# Patient Record
Sex: MALE | Race: WHITE | NOT HISPANIC OR LATINO | Employment: FULL TIME | URBAN - METROPOLITAN AREA
[De-identification: names, ages, dates, MRNs, and addresses within clinical notes are randomized per-mention and may not be internally consistent; named-entity substitution may affect disease eponyms.]

---

## 2020-07-06 ENCOUNTER — HOSPITAL ENCOUNTER (EMERGENCY)
Facility: MEDICAL CENTER | Age: 60
End: 2020-07-06
Attending: EMERGENCY MEDICINE
Payer: COMMERCIAL

## 2020-07-06 VITALS
DIASTOLIC BLOOD PRESSURE: 75 MMHG | SYSTOLIC BLOOD PRESSURE: 125 MMHG | RESPIRATION RATE: 16 BRPM | OXYGEN SATURATION: 96 % | TEMPERATURE: 99.2 F | BODY MASS INDEX: 31.21 KG/M2 | HEART RATE: 71 BPM | HEIGHT: 66 IN | WEIGHT: 194.22 LBS

## 2020-07-06 DIAGNOSIS — T15.01XA FOREIGN BODY OF RIGHT CORNEA, INITIAL ENCOUNTER: ICD-10-CM

## 2020-07-06 PROCEDURE — 65222 REMOVE FOREIGN BODY FROM EYE: CPT

## 2020-07-06 PROCEDURE — 99284 EMERGENCY DEPT VISIT MOD MDM: CPT

## 2020-07-06 PROCEDURE — 700101 HCHG RX REV CODE 250: Performed by: EMERGENCY MEDICINE

## 2020-07-06 RX ORDER — MOXIFLOXACIN 5 MG/ML
1 SOLUTION/ DROPS OPHTHALMIC 4 TIMES DAILY
Qty: 1 BOTTLE | Refills: 0 | Status: SHIPPED | OUTPATIENT
Start: 2020-07-06 | End: 2021-06-29

## 2020-07-06 RX ORDER — PROPARACAINE HYDROCHLORIDE 5 MG/ML
1 SOLUTION/ DROPS OPHTHALMIC ONCE
Status: COMPLETED | OUTPATIENT
Start: 2020-07-06 | End: 2020-07-06

## 2020-07-06 RX ADMIN — PROPARACAINE HYDROCHLORIDE 1 DROP: 5 SOLUTION/ DROPS OPHTHALMIC at 17:30

## 2020-07-06 RX ADMIN — FLUORESCEIN SODIUM 1 MG: 1 STRIP OPHTHALMIC at 17:30

## 2020-07-06 NOTE — LETTER
"  FORM C-4:  EMPLOYEE’S CLAIM FOR COMPENSATION/ REPORT OF INITIAL TREATMENT  EMPLOYEE’S CLAIM - PROVIDE ALL INFORMATION REQUESTED   First Name Hank Last Name George Birthdate 1960  Sex male Claim Number   Home Employee Address [address]                                     Zip  [address] Height  1.676 m (5' 6\") Weight  88.1 kg (194 lb 3.6 oz) [facility]     Mailing Employee Address [address]               Zip  [address] Telephone  [phone] (home)  Primary Language Spoken  English   Insurer   Third Party   EMPLOYERS INSURANCE Employee's Occupation (Job Title) When Injury or Occupational Disease Occurred     Employer's Name    [employer] Telephone [phone]    Employer Address [address]] Zip [address]   Date of Injury  7/6/2020       Hour of Injury  3:00 PM Date Employer Notified  7/6/2020 Last Day of Work after Injury or Occupational Disease  7/6/2020 Supervisor to Whom Injury Reported  Jens Vasquez   Address or Location of Accident (if applicable) [[address]]   What were you doing at the time of accident? (if applicable) Working under a car    How did this injury or occupational disease occur? Be specific and answer in detail. Use additional sheet if necessary)  Putting under carriage cover on under car with screws and screw    If you believe that you have an occupational disease, when did you first have knowledge of the disability and it relationship to your employment? N/A Witnesses to the Accident  Reynaldo   Nature of Injury or Occupational Disease  Workers' Compensation Part(s) of Body Injured or Affected  Eye (R), N/A, N/A    I CERTIFY THAT THE ABOVE IS TRUE AND CORRECT TO THE BEST OF MY KNOWLEDGE AND THAT I HAVE PROVIDED THIS INFORMATION IN ORDER TO OBTAIN THE BENEFITS OF [place], " INCLUSIVE OR CHAPTER 617 OF [place]).  I HEREBY AUTHORIZE ANY PHYSICIAN, CHIROPRACTOR, SURGEON, PRACTITIONER, OR OTHER PERSON, ANY HOSPITAL, INCLUDING [facility] OR [facility]ITAL, ANY MEDICAL SERVICE ORGANIZATION, ANY INSURANCE COMPANY, OR OTHER INSTITUTION OR ORGANIZATION TO RELEASE TO EACH OTHER, ANY MEDICAL OR OTHER INFORMATION, INCLUDING BENEFITS PAID OR PAYABLE, PERTINENT TO THIS INJURY OR DISEASE, EXCEPT INFORMATION RELATIVE TO DIAGNOSIS, TREATMENT AND/OR COUNSELING FOR AIDS, PSYCHOLOGICAL CONDITIONS, ALCOHOL OR CONTROLLED SUBSTANCES, FOR WHICH I MUST GIVE SPECIFIC AUTHORIZATION.  A PHOTOSTAT OF THIS AUTHORIZATION SHALL BE AS VALID AS THE ORIGINAL.  Date: 07/06/2020             Place: [facility]                    Employee’s Signature:   THIS REPORT MUST BE COMPLETED AND MAILED WITHIN 3 WORKING DAYS OF TREATMENT   Place [facility], EMERGENCY DEPT                                                                             Name of Facility [facility]   Date  7/6/2020 Diagnosis  (T15.01XA) Foreign body of right cornea, initial encounter Is there evidence the injured employee was under the influence of alcohol and/or another controlled substance at the time of accident?   Hour  6:02 PM Description of Injury or Disease  Foreign body of right cornea, initial encounter No   Treatment  Removal of Foreign body right eye  Have you advised the patient to remain off work five days or more?         No   X-Ray Findings    If Yes   From Date    To Date      From information given by the employee, together with medical evidence, can you directly connect this injury or occupational disease as job incurred? Yes If No, is employee capable of: Full Duty  No Modified Duty  No   Is additional medical care by a physician indicated? Yes If Modified Duty, Specify any Limitations / Restrictions   Off on 7/7 to see eye physician return to work 7/8   "  Do you know of any previous injury or disease contributing to this condition or occupational disease? No    Date 7/6/2020 Print Doctor’s Name Issa Mera certify the employer’s copy of this form was mailed on:   Address [address] [address] [phone] INSURER’S USE ONLY   Provider’s Tax ID Number [id number] Telephone Dept: [phone]    Doctor’s Signature e-ISSA Villavicencio D.O. Degree     MD      Form C-4 (rev.10/07)                                                                         BRIEF DESCRIPTION OF RIGHTS AND BENEFITS  (Pursuant to [place] 616C.050)    Notice of Injury or Occupational Disease (Incident Report Form C-1): If an injury or occupational disease (OD) arises out of and in the course of employment, you must provide written notice to your employer as soon as practicable, but no later than 7 days after the accident or OD. Your employer shall maintain a sufficient supply of the required forms.    Claim for Compensation (Form C-4): If medical treatment is sought, the form C-4 is available at the place of initial treatment. A completed \"Claim for Compensation\" (Form C-4) must be filed within 90 days after an accident or OD. The treating physician or chiropractor must, within 3 working days after treatment, complete and mail to the employer, the employer's insurer and third-party , the Claim for Compensation.    Medical Treatment: If you require medical treatment for your on-the-job injury or OD, you may be required to select a physician or chiropractor from a list provided by your workers’ compensation insurer, if it has contracted with an Organization for Managed Care (MCO) or Preferred Provider Organization (PPO) or providers of health care. If your employer has not entered into a contract with an MCO or PPO, you may select a physician or chiropractor from the Panel of Physicians and Chiropractors. Any medical costs related to your industrial injury " or OD will be paid by your insurer.    Temporary Total Disability (TTD): If your doctor has certified that you are unable to work for a period of at least 5 consecutive days, or 5 cumulative days in a 20-day period, or places restrictions on you that your employer does not accommodate, you may be entitled to TTD compensation.    Temporary Partial Disability (TPD): If the wage you receive upon reemployment is less than the compensation for TTD to which you are entitled, the insurer may be required to pay you TPD compensation to make up the difference. TPD can only be paid for a maximum of 24 months.    Permanent Partial Disability (PPD): When your medical condition is stable and there is an indication of a PPD as a result of your injury or OD, within 30 days, your insurer must arrange for an evaluation by a rating physician or chiropractor to determine the degree of your PPD. The amount of your PPD award depends on the date of injury, the results of the PPD evaluation and your age and wage.    Permanent Total Disability (PTD): If you are medically certified by a treating physician or chiropractor as permanently and totally disabled and have been granted a PTD status by your insurer, you are entitled to receive monthly benefits not to exceed 66 2/3% of your average monthly wage. The amount of your PTD payments is subject to reduction if you previously received a PPD award.    Vocational Rehabilitation Services: You may be eligible for vocational rehabilitation services if you are unable to return to the job due to a permanent physical impairment or permanent restrictions as a result of your injury or occupational disease.    Transportation and Per Fabi Reimbursement: You may be eligible for travel expenses and per fabi associated with medical treatment.    Reopening: You may be able to reopen your claim if your condition worsens after claim closure.     Appeal Process: If you disagree with a written determination  issued by the insurer or the insurer does not respond to your request, you may appeal to the Department of Administration, , by following the instructions contained in your determination letter. You must appeal the determination within 70 days from the date of the determination letter at [address], [address], [place], [place] [address], or [address], [address], [place], [place] [address]. If you disagree with the  decision, you may appeal to the Department of Administration, . You must file your appeal within 30 days from the date of the  decision letter at [address], [address], [place], [place] [address], or [address], [address], [place], [place] [address]. If you disagree with a decision of an , you may file a petition for judicial review with the District Court. You must do so within 30 days of the Appeal Officer’s decision. You may be represented by an  at your own expense or you may contact the [facility] for possible representation.    [place]  for Injured Workers (NAIW): If you disagree with a  decision, you may request that NAIW represent you without charge at an  Hearing. For information regarding denial of benefits, you may contact the [facility] at: [address], [address], [place] [address], [phone], or [address], [address], [place] [address], [phone]    To File a Complaint with the Division: If you wish to file a complaint with the  of the Division of Industrial Relations (DIR),  please contact the Workers’ Compensation Section, [address], [place], [place] [address], telephone [phone], or [address], [place], [place] [address], telephone [phone].    For assistance with Workers’ Compensation Issues: You may contact the Office of the Governor Consumer  Health Assistance, [address], [address], [place], [place] [address], Toll Free 1-745.178.5088, Web site: [email or web address]., E-mail [email or web address].  D-2 (rev. 06/18)              __________________________________________________________________                                    __07/06/2020_______________            Employee Name / Signature                                                                                                                            Date

## 2020-07-07 NOTE — ED PROVIDER NOTES
ED Provider Note    CHIEF COMPLAINT  Chief Complaint   Patient presents with   • Foreign Body in Eye      SIIS at [address] metal in rt eye Referred to ER by [facility]  Hank Vazquez is a 59 y.o. male who presents to the emergency room today with complaints of foreign body to right eye.  Patient states he was at work earlier today on her car and some dirt and metal fell onto his right I did flush it out there was still some left in there so presents to the emergency room.  Has history of diabetes denies any nausea no vomiting no fever chills no changes to bowel or bladder other complaints at this time tetanus is up-to-date.    REVIEW OF SYSTEMS  See HPI for further details. All other systems are negative.     PAST MEDICAL HISTORY  Past Medical History:   Diagnosis Date   • Diabetes (HCC)        FAMILY HISTORY  History reviewed. No pertinent family history.    SOCIAL HISTORY  Social History     Socioeconomic History   • Marital status:      Spouse name: Not on file   • Number of children: Not on file   • Years of education: Not on file   • Highest education level: Not on file   Occupational History   • Not on file   Social Needs   • Financial resource strain: Not on file   • Food insecurity     Worry: Not on file     Inability: Not on file   • Transportation needs     Medical: Not on file     Non-medical: Not on file   Tobacco Use   • Smoking status: Former Smoker   • Smokeless tobacco: Never Used   Substance and Sexual Activity   • Alcohol use: Not Currently   • Drug use: Not Currently   • Sexual activity: Not on file   Lifestyle   • Physical activity     Days per week: Not on file     Minutes per session: Not on file   • Stress: Not on file   Relationships   • Social connections     Talks on phone: Not on file     Gets together: Not on file     Attends Scientologist service: Not on file     Active member of club or organization: Not on file     Attends meetings of clubs or organizations: Not on file      "Relationship status: Not on file   • Intimate partner violence     Fear of current or ex partner: Not on file     Emotionally abused: Not on file     Physically abused: Not on file     Forced sexual activity: Not on file   Other Topics Concern   • Not on file   Social History Narrative   • Not on file       SURGICAL HISTORY  Past Surgical History:   Procedure Laterality Date   • OTHER      tubes in ears as child   • OTHER ORTHOPEDIC SURGERY      RT elbow ORIF       CURRENT MEDICATIONS  Home Medications    **Home medications have not yet been reviewed for this encounter**         ALLERGIES  No Known Allergies    PHYSICAL EXAM  VITAL SIGNS: /75   Pulse 71   Temp 37.3 °C (99.2 °F) (Temporal)   Resp 16   Ht 1.676 m (5' 6\")   Wt 88.1 kg (194 lb 3.6 oz)   SpO2 96%   BMI 31.35 kg/m²       Constitutional: Well developed, Well nourished, No acute distress, Non-toxic appearance.   HENT: Normocephalic, Atraumatic, Bilateral external ears normal, Oropharynx moist, No oral exudates, Nose normal.   Eyes: Over the limbic area of the cornea on 3:00 patient has foreign body noted inferiorly with slight rust ring this was removed by ER physician visual acuities were noted.  Slit lamp examination performed  Neck: Normal range of motion, No tenderness, Supple, No stridor.   Lymphatic: No lymphadenopathy noted.   Cardiovascular: Normal heart rate, Normal rhythm, No murmurs, No rubs, No gallops.   Thorax & Lungs: Normal breath sounds, No respiratory distress, No wheezing, No chest tenderness.   Skin: Warm, Dry, No erythema, No rash.   Extremities: Intact distal pulses, No edema, No tenderness, No cyanosis, No clubbing.     COURSE & MEDICAL DECISION MAKING  Pertinent Labs & Imaging studies reviewed. (See chart for details)  Sandhya case with Dr. Brown will follow patient up in the office tomorrow.  Patient placed on Vigamox.  Return if further problems discharged with above instructions for follow-up as described above. c-4 form " was filled out      FINAL IMPRESSION  1.  Acute foreign body cornea, right eye  2.  Corneal abrasion second #1 right eye  3.      Electronically signed by: Issa Mera D.O., 7/6/2020 11:16 PM

## 2020-07-07 NOTE — ED NOTES
Discharge and f/u (optho tomorrow @ 1:30) instructions discussed and understanding verbalized.  Ambulatory out of ED.  RX called in to [facility] in [place].

## 2020-07-11 NOTE — ED PROVIDER NOTES
PROCEDURE NOTE;    Removal of Foreign body from right eye: patient was given proparacaine drops.  Using sterile needle foreign body removed patient tolerated procedure well no complications.

## 2021-06-21 ENCOUNTER — TELEPHONE (OUTPATIENT)
Dept: SCHEDULING | Facility: IMAGING CENTER | Age: 61
End: 2021-06-21

## 2021-06-29 ENCOUNTER — HOSPITAL ENCOUNTER (OUTPATIENT)
Dept: LAB | Facility: MEDICAL CENTER | Age: 61
End: 2021-06-29
Attending: NURSE PRACTITIONER
Payer: COMMERCIAL

## 2021-06-29 ENCOUNTER — OFFICE VISIT (OUTPATIENT)
Dept: MEDICAL GROUP | Facility: PHYSICIAN GROUP | Age: 61
End: 2021-06-29
Payer: COMMERCIAL

## 2021-06-29 VITALS
SYSTOLIC BLOOD PRESSURE: 126 MMHG | DIASTOLIC BLOOD PRESSURE: 74 MMHG | WEIGHT: 192.6 LBS | HEIGHT: 66 IN | OXYGEN SATURATION: 96 % | BODY MASS INDEX: 30.95 KG/M2 | HEART RATE: 74 BPM | TEMPERATURE: 97.5 F

## 2021-06-29 DIAGNOSIS — R19.7 DIARRHEA, UNSPECIFIED TYPE: ICD-10-CM

## 2021-06-29 DIAGNOSIS — I10 ESSENTIAL HYPERTENSION: ICD-10-CM

## 2021-06-29 DIAGNOSIS — Z12.11 COLON CANCER SCREENING: ICD-10-CM

## 2021-06-29 DIAGNOSIS — E11.65 TYPE 2 DIABETES MELLITUS WITH HYPERGLYCEMIA, WITHOUT LONG-TERM CURRENT USE OF INSULIN (HCC): ICD-10-CM

## 2021-06-29 DIAGNOSIS — Z76.89 ENCOUNTER TO ESTABLISH CARE: ICD-10-CM

## 2021-06-29 PROBLEM — E11.9 TYPE 2 DIABETES MELLITUS (HCC): Status: ACTIVE | Noted: 2021-06-29

## 2021-06-29 LAB
ALBUMIN SERPL BCP-MCNC: 4.1 G/DL (ref 3.2–4.9)
ALBUMIN/GLOB SERPL: 1.3 G/DL
ALP SERPL-CCNC: 64 U/L (ref 30–99)
ALT SERPL-CCNC: 55 U/L (ref 2–50)
ANION GAP SERPL CALC-SCNC: 8 MMOL/L (ref 7–16)
AST SERPL-CCNC: 33 U/L (ref 12–45)
BILIRUB SERPL-MCNC: 0.9 MG/DL (ref 0.1–1.5)
BUN SERPL-MCNC: 15 MG/DL (ref 8–22)
CALCIUM SERPL-MCNC: 9.5 MG/DL (ref 8.5–10.5)
CHLORIDE SERPL-SCNC: 109 MMOL/L (ref 96–112)
CHOLEST SERPL-MCNC: 130 MG/DL (ref 100–199)
CO2 SERPL-SCNC: 23 MMOL/L (ref 20–33)
CREAT SERPL-MCNC: 0.87 MG/DL (ref 0.5–1.4)
FASTING STATUS PATIENT QL REPORTED: NORMAL
GLOBULIN SER CALC-MCNC: 3.2 G/DL (ref 1.9–3.5)
GLUCOSE SERPL-MCNC: 130 MG/DL (ref 65–99)
HDLC SERPL-MCNC: 32 MG/DL
LDLC SERPL CALC-MCNC: 70 MG/DL
POTASSIUM SERPL-SCNC: 4.3 MMOL/L (ref 3.6–5.5)
PROT SERPL-MCNC: 7.3 G/DL (ref 6–8.2)
SODIUM SERPL-SCNC: 140 MMOL/L (ref 135–145)
TRIGL SERPL-MCNC: 138 MG/DL (ref 0–149)

## 2021-06-29 PROCEDURE — 80053 COMPREHEN METABOLIC PANEL: CPT

## 2021-06-29 PROCEDURE — 85025 COMPLETE CBC W/AUTO DIFF WBC: CPT

## 2021-06-29 PROCEDURE — 99214 OFFICE O/P EST MOD 30 MIN: CPT | Performed by: NURSE PRACTITIONER

## 2021-06-29 PROCEDURE — 80061 LIPID PANEL: CPT

## 2021-06-29 PROCEDURE — 83036 HEMOGLOBIN GLYCOSYLATED A1C: CPT

## 2021-06-29 PROCEDURE — 36415 COLL VENOUS BLD VENIPUNCTURE: CPT

## 2021-06-29 RX ORDER — GLIMEPIRIDE 4 MG/1
TABLET ORAL
COMMUNITY
Start: 2021-06-04 | End: 2021-12-13 | Stop reason: SDUPTHER

## 2021-06-29 RX ORDER — ALPHA LIPOIC ACID 300 MG
300 CAPSULE ORAL
COMMUNITY
End: 2021-09-09

## 2021-06-29 RX ORDER — LANCETS 23 GAUGE
EACH MISCELLANEOUS
COMMUNITY
Start: 2018-12-20 | End: 2021-12-04

## 2021-06-29 RX ORDER — METFORMIN HYDROCHLORIDE 500 MG/1
1000 TABLET, EXTENDED RELEASE ORAL DAILY
Qty: 180 TABLET | Refills: 3 | Status: SHIPPED | OUTPATIENT
Start: 2021-06-29 | End: 2022-08-16 | Stop reason: SDUPTHER

## 2021-06-29 RX ORDER — LOPERAMIDE HYDROCHLORIDE 2 MG/1
2 CAPSULE ORAL 4 TIMES DAILY PRN
Qty: 30 CAPSULE | Refills: 0 | Status: SHIPPED | OUTPATIENT
Start: 2021-06-29 | End: 2021-09-09

## 2021-06-29 RX ORDER — VITAMIN B COMPLEX
5000 TABLET ORAL DAILY
COMMUNITY
End: 2021-09-09

## 2021-06-29 ASSESSMENT — PATIENT HEALTH QUESTIONNAIRE - PHQ9: CLINICAL INTERPRETATION OF PHQ2 SCORE: 0

## 2021-06-29 NOTE — PROGRESS NOTES
"Chief Complaint   Patient presents with   • Diarrhea   • Diabetes     1month and a half       Subjective:     HPI: [facility] is established with [facility] however he is a new patient to me.  His last primary care provider was Dr. España at [facility].  He carries with him the diagnosis of diabetes and HTN.    Hank Vazquez is a 60 y.o. male here to discuss the evaluation and management of:        Essential hypertension  Stable. Does not do Monitoring BP at home. Also taking baby aspirin. Denies lightheadedness, vision changes, headache, palpitations or leg swelling.  BP today is 126/74.      Type 2 diabetes mellitus (HCC)  This is a chronic condition diagnosis in 2008.  Current medications: Metformin 1000 mg BID Amaryl 4 mg daily .  Last A1c: unknown.Pt report that is was low and that his last PCP was happy with were is was at.   Last Microalb/Cr ratio: unknown  Fasting sugars at home: today 137 yesterday was 135 in the AM.   Last diabetic foot exam: unknown  Last retinal eye exam: about 5-6 months ago at [facility]  ACEi/ARB? none  Statin? About 3-4 months ago [facility]. Status is unknown  Aspirin? yes   Concomitant HTN? At goal 126/74. Not ACE/ARB    Nightly foot checks?  yes  Denies any hypoglycemic events, polyuria or polydipsia.       Diarrhea  Onset: about 1 month ago  consistancy: watery/pasty, constant, changes in color from green, dark brown/black, to tan. Yesterday it was green. Denies any blood or mucus in his stools  Frequency: 5-6 times a day and staying the same with mild increase in \"chunks\"  When do the symptoms occur? After eatting  Modifying factors: nothing tried  Associated symptoms: fever, abdominal pain, nausea/vomiting, or abdominal bloating.   Positive for mild crapping right before having diarrhea.   Currently on Metformin 1000 mg and has been on it since 2008.   No recent travel, camping or family members sick.         ROS:  Gen: no fevers/chills, no changes in weight  Eyes: no changes in " "vision  ENT: no sore throat,   Pulm: no sob, no cough  CV: no chest pain, no palpitations  GI: Positive per HPI   : no dysuria  MSk: no myalgias  Neuro: no numbness/tingling  Heme/Lymph: no easy bruising    No Known Allergies    Current medicines (including changes today)  Current Outpatient Medications   Medication Sig Dispense Refill   • Glucose Blood (COOL BLOOD GLUCOSE TEST STRIPS VI)      • Lancets 28G Misc      • glimepiride (AMARYL) 4 MG Tab TAKE 1 TABLET BY MOUTH DAILY     • Fenugreek 610 MG Cap Take 610 mg by mouth every day.     • Alpha-Lipoic Acid 300 MG Cap Take 300 mg by mouth.     • vitamin D (CHOLECALCIFEROL) 1000 Unit (25 mcg) Tab Take 5,000 Units by mouth every day.     • loperamide (IMODIUM) 2 MG Cap Take 1 capsule by mouth 4 times a day as needed for Diarrhea. 30 capsule 0   • metFORMIN ER (GLUCOPHAGE XR) 500 MG TABLET SR 24 HR Take 2 Tablets by mouth every day. 180 tablet 3     No current facility-administered medications for this visit.       Social History     Tobacco Use   • Smoking status: Former Smoker   • Smokeless tobacco: Never Used   Vaping Use   • Vaping Use: Never used   Substance Use Topics   • Alcohol use: Not Currently   • Drug use: Not Currently       Patient Active Problem List    Diagnosis Date Noted   • Type 2 diabetes mellitus (HCC) 06/29/2021   • Diarrhea 06/29/2021   • Essential hypertension 04/16/2015       Family History   Problem Relation Age of Onset   • Heart Attack Mother    • Alzheimer's Disease Mother    • Arthritis Father    • Gout Father    • Alzheimer's Disease Maternal Grandfather           Objective:     /74 (BP Location: Right arm, Patient Position: Sitting, BP Cuff Size: Large adult)   Pulse 74   Temp 36.4 °C (97.5 °F) (Temporal)   Ht 1.664 m (5' 5.5\")   Wt 87.4 kg (192 lb 9.6 oz)   SpO2 96%  Body mass index is 31.56 kg/m².    Physical Exam:  Constitutional: Well-developed and well-nourished male in NAD. Not diaphoretic. No distress.   Skin: warm, " dry, intact, no evidence of rash or concerning lesions  Head: Atraumatic without lesions.  Eyes: Conjunctivae and extraocular motions are normal. Pupils are equal, round, and reactive to light. No scleral icterus.   Ears:  External ears unremarkable. TMs normal; bilaterally  Mouth/Throat: Tongue normal. Oropharynx is clear and moist. Posterior pharynx without erythema or exudates.  Neck: Supple, trachea midline. No thyromegaly present. No cervical or supraclavicular lymphadenopathy.  Cardiovascular: Regular rate and rhythm without murmur.  Radial pulses are intact and equal bilaterally.  Pulmonary: Clear to ausculation. Normal effort. No rales, ronchi, or wheezing.  Abdomen: Soft, non tender, and without distention. Hyperactive bowel sounds in all four quadrants. No rebound, guarding, masses or hepatosplenomegaly.  Extremities: No cyanosis, clubbing, erythema, nor edema.   Neurological: Alert and oriented x 3.  No cranial nerve deficit. 5/5 myotomes. Sensation intact.   Psychiatric:  Behavior, mood, and affect are appropriate.       Assessment and Plan:     The following treatment plan was discussed:    1. Encounter to establish care  Very pleasant 60-year-old male patient presents here today to establish care. His previous primary care provider was [facility]. He denies any history of having a colonoscopy. He does carry with him the diagnosis of hypertension and diabetes. He is unsure of his last blood results. We will be requesting records from [facility]. He reports last blood work was done approximately 5 months ago.    2. Essential hypertension  This is well controlled condition. Patient is not currently on an ACE inhibitor. We will discuss at future appointments after labs have been completed.  - Comp Metabolic Panel; Future    3. Type 2 diabetes mellitus with hyperglycemia, without long-term current use of insulin (HCC)  Current status unknown. Discussed with patient the importance of following a diabetic diet  as well as lifestyle modifications. Change Metformin to extended release due to recent diarrhea. Labs been ordered and follow-up in 3 weeks.  - Comp Metabolic Panel; Future  - Lipid Profile; Future  - HEMOGLOBIN A1C; Future  - Complete O&P; Future  - metFORMIN ER (GLUCOPHAGE XR) 500 MG TABLET SR 24 HR; Take 2 Tablets by mouth every day.  Dispense: 180 tablet; Refill: 3    4. Diarrhea, unspecified type  This is a new condition. Discussed with patient the brat diet and gave him discharge education in regards to diabetes care. Metformin was changed to extended release. Referral to colonoscopy was placed. Imodium prescription sent to pharmacy and proper use was discussed with patient. Patient was sent with hat and specimen collection cup to collect stool at home. He declined rectal exam today.  - Comp Metabolic Panel; Future  - CBC WITH DIFFERENTIAL; Future  - CULTURE STOOL; Future  - OCCULT BLOOD FECES IMMUNOASSAY; Future  - Fecal Lactoferrin Qualitative  - Complete O&P; Future  - REFERRAL TO GI FOR COLONOSCOPY   loperamide (IMODIUM) 2 MG Cap; Take 1 capsule by mouth 4 times a day as needed for Diarrhea.  Dispense: 30 capsule; Refill: 0    5. Colon cancer screening  - REFERRAL TO GI FOR COLONOSCOPY    Other orders  - Glucose Blood (COOL BLOOD GLUCOSE TEST STRIPS VI)  - Lancets 28G Misc  - glimepiride (AMARYL) 4 MG Tab; TAKE 1 TABLET BY MOUTH DAILY  - Fenugreek 610 MG Cap; Take 610 mg by mouth every day.  - Alpha-Lipoic Acid 300 MG Cap; Take 300 mg by mouth.  - vitamin D (CHOLECALCIFEROL) 1000 Unit (25 mcg) Tab; Take 5,000 Units by mouth every day.    Any change or worsening of signs or symptoms, patient encouraged to follow-up or report to emergency room for further evaluation. Patient verbalizes understanding and agrees.    Follow-Up: Return in about 3 weeks (around 7/20/2021) for diarrhea and DM.      PLEASE NOTE: This dictation was created using voice recognition software. I have made every reasonable attempt to  correct obvious errors, but I expect that there are errors of grammar and possibly content that I did not discover before finalizing the note.

## 2021-06-29 NOTE — ASSESSMENT & PLAN NOTE
"Onset: about 1 month ago  consistancy: watery/pasty, constant, changes in color from green, dark brown/black, to tan. Yesterday it was green. Denies any blood or mucus in his stools  Frequency: 5-6 times a day and staying the same with mild increase in \"chunks\"  When do the symptoms occur? After eatting  Modifying factors: nothing tried  Associated symptoms: fever, abdominal pain, nausea/vomiting, or abdominal bloating.   Positive for mild crapping right before having diarrhea.   Currently on Metformin 1000 mg and has been on it since 2008.   No recent travel, camping or family members sick.     "

## 2021-06-29 NOTE — ASSESSMENT & PLAN NOTE
Stable. Does not do Monitoring BP at home. Also taking baby aspirin. Denies lightheadedness, vision changes, headache, palpitations or leg swelling.  BP today is 126/74.

## 2021-06-29 NOTE — LETTER
[facility]  LEEANN Bucio  [address] [place]  [place] [address]  Fax: [phone]   Authorization for Release/Disclosure of   Protected Health Information   Name: ANGEL VAZQUEZ : 1960 SSN: [id number]   Address: [address] [place] [address]  [place] [address] Phone:    [phone] (home)    I authorize the entity listed below to release/disclose the PHI below to:   [facility]/LEEANN Bucio and LEEANN Bucio   Provider or Entity Name:     Address   City, State, Zip   Phone:      Fax:     Reason for request: continuity of care   Information to be released:    [  ] LAST COLONOSCOPY,  including any PATH REPORT and follow-up  [  ] LAST FIT/COLOGUARD RESULT [  ] LAST DEXA  [  ] LAST MAMMOGRAM  [  ] LAST PAP  [  ] LAST LABS [  ] RETINA EXAM REPORT  [  ] IMMUNIZATION RECORDS  [  ] Release all info      [  ] Check here and initial the line next to each item to release ALL health information INCLUDING  _____ Care and treatment for drug and / or alcohol abuse  _____ HIV testing, infection status, or AIDS  _____ Genetic Testing    DATES OF SERVICE OR TIME PERIOD TO BE DISCLOSED: _____________  I understand and acknowledge that:  * This Authorization may be revoked at any time by you in writing, except if your health information has already been used or disclosed.  * Your health information that will be used or disclosed as a result of you signing this authorization could be re-disclosed by the recipient. If this occurs, your re-disclosed health information may no longer be protected by State or Federal laws.  * You may refuse to sign this Authorization. Your refusal will not affect your ability to obtain treatment.  * This Authorization becomes effective upon signing and will  on (date) __________.      If no date is indicated, this Authorization will  one (1) year from the signature date.    Name: Angel Vazquez    Signature:   Date:     2021        PLEASE FAX REQUESTED RECORDS BACK TO: [phone]

## 2021-06-29 NOTE — ASSESSMENT & PLAN NOTE
This is a chronic condition diagnosis in 2008.  Current medications: Metformin 1000 mg BID Amaryl 4 mg daily .  Last A1c: unknown.Pt report that is was low and that his last PCP was happy with were is was at.   Last Microalb/Cr ratio: unknown  Fasting sugars at home: today 137 yesterday was 135 in the AM.   Last diabetic foot exam: unknown  Last retinal eye exam: about 5-6 months ago at [facility]  ACEi/ARB? none  Statin? About 3-4 months ago at [facility]. Status is unknown  Aspirin? yes   Concomitant HTN? At goal 126/74. Not ACE/ARB    Nightly foot checks?  yes  Denies any hypoglycemic events, polyuria or polydipsia.

## 2021-06-29 NOTE — PATIENT INSTRUCTIONS
Diabetes Basics    Diabetes (diabetes mellitus) is a long-term (chronic) disease. It occurs when the body does not properly use sugar (glucose) that is released from food after you eat.  Diabetes may be caused by one or both of these problems:  · Your pancreas does not make enough of a hormone called insulin.  · Your body does not react in a normal way to insulin that it makes.  Insulin lets sugars (glucose) go into cells in your body. This gives you energy. If you have diabetes, sugars cannot get into cells. This causes high blood sugar (hyperglycemia).  Follow these instructions at home:  How is diabetes treated?  You may need to take insulin or other diabetes medicines daily to keep your blood sugar in balance. Take your diabetes medicines every day as told by your doctor. List your diabetes medicines here:  Diabetes medicines  · Name of medicine: ______________________________  ? Amount (dose): _______________ Time (a.m./p.m.): _______________ Notes: ___________________________________  · Name of medicine: ______________________________  ? Amount (dose): _______________ Time (a.m./p.m.): _______________ Notes: ___________________________________  · Name of medicine: ______________________________  ? Amount (dose): _______________ Time (a.m./p.m.): _______________ Notes: ___________________________________  If you use insulin, you will learn how to give yourself insulin by injection. You may need to adjust the amount based on the food that you eat. List the types of insulin you use here:  Insulin  · Insulin type: ______________________________  ? Amount (dose): _______________ Time (a.m./p.m.): _______________ Notes: ___________________________________  · Insulin type: ______________________________  ? Amount (dose): _______________ Time (a.m./p.m.): _______________ Notes: ___________________________________  · Insulin type: ______________________________  ? Amount (dose): _______________ Time (a.m./p.m.):  _______________ Notes: ___________________________________  · Insulin type: ______________________________  ? Amount (dose): _______________ Time (a.m./p.m.): _______________ Notes: ___________________________________  · Insulin type: ______________________________  ? Amount (dose): _______________ Time (a.m./p.m.): _______________ Notes: ___________________________________  How do I manage my blood sugar?    Check your blood sugar levels using a blood glucose monitor as directed by your doctor.  Your doctor will set treatment goals for you. Generally, you should have these blood sugar levels:  · Before meals (preprandial):  mg/dL (4.4-7.2 mmol/L).  · After meals (postprandial): below 180 mg/dL (10 mmol/L).  · A1c level: less than 7%.  Write down the times that you will check your blood sugar levels:  Blood sugar checks  · Time: _______________ Notes: ___________________________________  · Time: _______________ Notes: ___________________________________  · Time: _______________ Notes: ___________________________________  · Time: _______________ Notes: ___________________________________  · Time: _______________ Notes: ___________________________________  · Time: _______________ Notes: ___________________________________    What do I need to know about low blood sugar?  Low blood sugar is called hypoglycemia. This is when blood sugar is at or below 70 mg/dL (3.9 mmol/L). Symptoms may include:  · Feeling:  ? Hungry.  ? Worried or nervous (anxious).  ? Sweaty and clammy.  ? Confused.  ? Dizzy.  ? Sleepy.  ? Sick to your stomach (nauseous).  · Having:  ? A fast heartbeat.  ? A headache.  ? A change in your vision.  ? Tingling or no feeling (numbness) around the mouth, lips, or tongue.  ? Jerky movements that you cannot control (seizure).  · Having trouble with:  ? Moving (coordination).  ? Sleeping.  ? Passing out (fainting).  ? Getting upset easily (irritability).  Treating low blood sugar  To treat low blood  sugar, eat or drink something sugary right away. If you can think clearly and swallow safely, follow the 15:15 rule:  · Take 15 grams of a fast-acting carb (carbohydrate). Talk with your doctor about how much you should take.  · Some fast-acting carbs are:  ? Sugar tablets (glucose pills). Take 3-4 glucose pills.  ? 6-8 pieces of hard candy.  ? 4-6 oz (120-150 mL) of fruit juice.  ? 4-6 oz (120-150 mL) of regular (not diet) soda.  ? 1 Tbsp (15 mL) honey or sugar.  · Check your blood sugar 15 minutes after you take the carb.  · If your blood sugar is still at or below 70 mg/dL (3.9 mmol/L), take 15 grams of a carb again.  · If your blood sugar does not go above 70 mg/dL (3.9 mmol/L) after 3 tries, get help right away.  · After your blood sugar goes back to normal, eat a meal or a snack within 1 hour.  Treating very low blood sugar  If your blood sugar is at or below 54 mg/dL (3 mmol/L), you have very low blood sugar (severe hypoglycemia). This is an emergency. Do not wait to see if the symptoms will go away. Get medical help right away. Call your local emergency services (911 in the [place] Do not drive yourself to the hospital.  Questions to ask your health care provider  · Do I need to meet with a diabetes educator?  · What equipment will I need to care for myself at home?  · What diabetes medicines do I need? When should I take them?  · How often do I need to check my blood sugar?  · What number can I call if I have questions?  · When is my next doctor's visit?  · Where can I find a support group for people with diabetes?  Where to find more information  · American Diabetes Association: www.diabetes.org  · American Association of Diabetes Educators: www.diabeteseducator.org/patient-resources  Contact a doctor if:  · Your blood sugar is at or above 240 mg/dL (13.3 mmol/L) for 2 days in a row.  · You have been sick or have had a fever for 2 days or more, and you are not getting better.  · You have any of these  problems for more than 6 hours:  ? You cannot eat or drink.  ? You feel sick to your stomach (nauseous).  ? You throw up (vomit).  ? You have watery poop (diarrhea).  Get help right away if:  · Your blood sugar is lower than 54 mg/dL (3 mmol/L).  · You get confused.  · You have trouble:  ? Thinking clearly.  ? Breathing.  Summary  · Diabetes (diabetes mellitus) is a long-term (chronic) disease. It occurs when the body does not properly use sugar (glucose) that is released from food after digestion.  · Take insulin and diabetes medicines as told.  · Check your blood sugar every day, as often as told.  · Keep all follow-up visits as told by your doctor. This is important.  This information is not intended to replace advice given to you by your health care provider. Make sure you discuss any questions you have with your health care provider.  Document Released: 03/22/2019 Document Revised: 02/07/2020 Document Reviewed: 03/22/2019  [employer] Patient Education © 2020 [employer]      Diarrhea, Adult  Diarrhea is when you pass loose and watery poop (stool) often. Diarrhea can make you feel weak and cause you to lose water in your body (get dehydrated). Losing water in your body can cause you to:  · Feel tired and thirsty.  · Have a dry mouth.  · Go pee (urinate) less often.  Diarrhea often lasts 2-3 days. However, it can last longer if it is a sign of something more serious. It is important to treat your diarrhea as told by your doctor.  Follow these instructions at home:  Eating and drinking         Follow these instructions as told by your doctor:  · Take an ORS (oral rehydration solution). This is a drink that helps you replace fluids and minerals your body lost. It is sold at pharmacies and stores.  · Drink plenty of fluids, such as:  ? Water.  ? Ice chips.  ? Diluted fruit juice.  ? Low-calorie sports drinks.  ? Milk, if you want.  · Avoid drinking fluids that have a lot of sugar or caffeine in them.  · Eat bland,  easy-to-digest foods in small amounts as you are able. These foods include:  ? Bananas.  ? Applesauce.  ? Rice.  ? Low-fat (lean) meats.  ? Toast.  ? Crackers.  · Avoid alcohol.  · Avoid spicy or fatty foods.    Medicines  · Take over-the-counter and prescription medicines only as told by your doctor.  · If you were prescribed an antibiotic medicine, take it as told by your doctor. Do not stop using the antibiotic even if you start to feel better.  General instructions    · Wash your hands often using soap and water. If soap and water are not available, use a hand . Others in your home should wash their hands as well. Hands should be washed:  ? After using the toilet or changing a diaper.  ? Before preparing, cooking, or serving food.  ? While caring for a sick person.  ? While visiting someone in a hospital.  · Drink enough fluid to keep your pee (urine) pale yellow.  · Rest at home while you get better.  · Watch your condition for any changes.  · Take a warm bath to help with any burning or pain from having diarrhea.  · Keep all follow-up visits as told by your doctor. This is important.  Contact a doctor if:  · You have a fever.  · Your diarrhea gets worse.  · You have new symptoms.  · You cannot keep fluids down.  · You feel light-headed or dizzy.  · You have a headache.  · You have muscle cramps.  Get help right away if:  · You have chest pain.  · You feel very weak or you pass out (faint).  · You have bloody or black poop or poop that looks like tar.  · You have very bad pain, cramping, or bloating in your belly (abdomen).  · You have trouble breathing or you are breathing very quickly.  · Your heart is beating very quickly.  · Your skin feels cold and clammy.  · You feel confused.  · You have signs of losing too much water in your body, such as:  ? Dark pee, very little pee, or no pee.  ? Cracked lips.  ? Dry mouth.  ? Sunken eyes.  ? Sleepiness.  ? Weakness.  Summary  · Diarrhea is when you pass  loose and watery poop (stool) often.  · Diarrhea can make you feel weak and cause you to lose water in your body (get dehydrated).  · Take an ORS (oral rehydration solution). This is a drink that is sold at pharmacies and stores.  · Eat bland, easy-to-digest foods in small amounts as you are able.  · Contact a doctor if your condition gets worse. Get help right away if you have signs that you have lost too much water in your body.  This information is not intended to replace advice given to you by your health care provider. Make sure you discuss any questions you have with your health care provider.  Document Released: 06/05/2009 Document Revised: 05/24/2019 Document Reviewed: 05/24/2019  [employer] Patient Education © 2020 [employer]

## 2021-06-30 ENCOUNTER — HOSPITAL ENCOUNTER (OUTPATIENT)
Facility: MEDICAL CENTER | Age: 61
End: 2021-06-30
Attending: NURSE PRACTITIONER
Payer: COMMERCIAL

## 2021-06-30 DIAGNOSIS — E11.65 TYPE 2 DIABETES MELLITUS WITH HYPERGLYCEMIA, WITHOUT LONG-TERM CURRENT USE OF INSULIN (HCC): ICD-10-CM

## 2021-06-30 DIAGNOSIS — R19.7 DIARRHEA, UNSPECIFIED TYPE: ICD-10-CM

## 2021-06-30 LAB
BASOPHILS # BLD AUTO: 0.9 % (ref 0–1.8)
BASOPHILS # BLD: 0.06 K/UL (ref 0–0.12)
EOSINOPHIL # BLD AUTO: 0.34 K/UL (ref 0–0.51)
EOSINOPHIL NFR BLD: 5 % (ref 0–6.9)
ERYTHROCYTE [DISTWIDTH] IN BLOOD BY AUTOMATED COUNT: 43.1 FL (ref 35.9–50)
EST. AVERAGE GLUCOSE BLD GHB EST-MCNC: 148 MG/DL
HBA1C MFR BLD: 6.8 % (ref 4–5.6)
HCT VFR BLD AUTO: 46.5 % (ref 42–52)
HGB BLD-MCNC: 16.1 G/DL (ref 14–18)
IMM GRANULOCYTES # BLD AUTO: 0.02 K/UL (ref 0–0.11)
IMM GRANULOCYTES NFR BLD AUTO: 0.3 % (ref 0–0.9)
LYMPHOCYTES # BLD AUTO: 1.4 K/UL (ref 1–4.8)
LYMPHOCYTES NFR BLD: 20.4 % (ref 22–41)
MCH RBC QN AUTO: 30.7 PG (ref 27–33)
MCHC RBC AUTO-ENTMCNC: 34.6 G/DL (ref 33.7–35.3)
MCV RBC AUTO: 88.7 FL (ref 81.4–97.8)
MONOCYTES # BLD AUTO: 0.61 K/UL (ref 0–0.85)
MONOCYTES NFR BLD AUTO: 8.9 % (ref 0–13.4)
NEUTROPHILS # BLD AUTO: 4.42 K/UL (ref 1.82–7.42)
NEUTROPHILS NFR BLD: 64.5 % (ref 44–72)
NRBC # BLD AUTO: 0 K/UL
NRBC BLD-RTO: 0 /100 WBC
PLATELET # BLD AUTO: 200 K/UL (ref 164–446)
PMV BLD AUTO: 10.6 FL (ref 9–12.9)
RBC # BLD AUTO: 5.24 M/UL (ref 4.7–6.1)
WBC # BLD AUTO: 6.9 K/UL (ref 4.8–10.8)

## 2021-06-30 PROCEDURE — 87899 AGENT NOS ASSAY W/OPTIC: CPT

## 2021-06-30 PROCEDURE — 87328 CRYPTOSPORIDIUM AG IA: CPT

## 2021-06-30 PROCEDURE — 87329 GIARDIA AG IA: CPT

## 2021-06-30 PROCEDURE — 87045 FECES CULTURE AEROBIC BACT: CPT

## 2021-06-30 PROCEDURE — 83630 LACTOFERRIN FECAL (QUAL): CPT

## 2021-07-01 LAB
G LAMBLIA+C PARVUM AG STL QL RAPID: NORMAL
SIGNIFICANT IND 70042: NORMAL
SITE SITE: NORMAL
SOURCE SOURCE: NORMAL

## 2021-07-01 RX ORDER — BLOOD SUGAR DIAGNOSTIC
1 STRIP MISCELLANEOUS 2 TIMES DAILY PRN
Qty: 100 STRIP | Refills: 11 | Status: SHIPPED | OUTPATIENT
Start: 2021-07-01 | End: 2022-07-14 | Stop reason: SDUPTHER

## 2021-07-02 LAB
E COLI SXT1+2 STL IA: NORMAL
LACTOFERRIN STL QL IA: POSITIVE
SIGNIFICANT IND 70042: NORMAL
SITE SITE: NORMAL
SOURCE SOURCE: NORMAL

## 2021-07-04 LAB
BACTERIA STL CULT: NORMAL
C JEJUNI+C COLI AG STL QL: NORMAL
E COLI SXT1+2 STL IA: NORMAL
SIGNIFICANT IND 70042: NORMAL
SITE SITE: NORMAL
SOURCE SOURCE: NORMAL

## 2021-07-06 DIAGNOSIS — R19.7 DIARRHEA, UNSPECIFIED TYPE: ICD-10-CM

## 2021-09-09 ENCOUNTER — OFFICE VISIT (OUTPATIENT)
Dept: MEDICAL GROUP | Facility: PHYSICIAN GROUP | Age: 61
End: 2021-09-09
Payer: COMMERCIAL

## 2021-09-09 VITALS
WEIGHT: 199.8 LBS | RESPIRATION RATE: 16 BRPM | HEART RATE: 56 BPM | BODY MASS INDEX: 32.11 KG/M2 | DIASTOLIC BLOOD PRESSURE: 70 MMHG | HEIGHT: 66 IN | SYSTOLIC BLOOD PRESSURE: 120 MMHG | OXYGEN SATURATION: 92 % | TEMPERATURE: 97.6 F

## 2021-09-09 DIAGNOSIS — I10 ESSENTIAL HYPERTENSION: ICD-10-CM

## 2021-09-09 DIAGNOSIS — E11.65 TYPE 2 DIABETES MELLITUS WITH HYPERGLYCEMIA, WITHOUT LONG-TERM CURRENT USE OF INSULIN (HCC): ICD-10-CM

## 2021-09-09 DIAGNOSIS — K52.832 LYMPHOCYTIC COLITIS: ICD-10-CM

## 2021-09-09 PROCEDURE — 92250 FUNDUS PHOTOGRAPHY W/I&R: CPT | Mod: 26 | Performed by: NURSE PRACTITIONER

## 2021-09-09 PROCEDURE — 99214 OFFICE O/P EST MOD 30 MIN: CPT | Performed by: NURSE PRACTITIONER

## 2021-09-09 RX ORDER — ATORVASTATIN CALCIUM 20 MG/1
20 TABLET, FILM COATED ORAL EVERY EVENING
Qty: 90 TABLET | Refills: 3 | Status: SHIPPED | OUTPATIENT
Start: 2021-09-09 | End: 2022-12-19 | Stop reason: SDUPTHER

## 2021-09-09 RX ORDER — BUDESONIDE 3 MG/1
CAPSULE, COATED PELLETS ORAL
COMMUNITY
Start: 2021-08-27 | End: 2022-12-19

## 2021-09-09 ASSESSMENT — FIBROSIS 4 INDEX: FIB4 SCORE: 1.31

## 2021-09-09 NOTE — PATIENT INSTRUCTIONS
Probiotics  Probiotics are the good bacteria and yeasts that live in your body and keep your digestive system healthy. Probiotics also help your body's defense system (immune system) and protect your body against the growth of harmful bacteria. Your health care provider may recommend taking a probiotic if you are taking antibiotics or have certain medical conditions, such as:  · Diarrhea.  · Constipation.  · Irritable bowel syndrome.  · Lung infections.  · Yeast infections.  · Acne, eczema, and other skin conditions.  · Frequent urinary tract infections.  What affects the balance of bacteria in my body?  The balance of good bacteria in your body can be affected by:  · Antibiotic medicines. These medicines treat infections caused by bacteria. Unfortunately, they may kill the good bacteria in your body as well as the bad bacteria.  · Certain medical conditions. Conditions related to an imbalance of bacteria include:  ? Stomach and intestine (gastrointestinal) infections.  ? Lung infections.  ? Skin infections.  ? Vaginal infections.  ? Inflammatory bowel diseases.  ? Stomach ulcers (gastric ulcers).  ? Tooth decay and gum disease (periodontal disease).  · Stress.  · Poor diet.  What type of probiotic is right for me?  Probiotics contain different types of bacteria (strains). Strains commonly found in probiotics include:  · Lactobacillus.  · Saccharomyces.  · Bifidobacterium.  Specific strains have been shown to be more effective for certain health conditions. Ask your health care provider which strain or strains you should use and how often.  Probiotics come in many different forms, strain combinations, and strengths. Some may need to be refrigerated. Always read the label for storage and usage instructions.  Certain foods, such as yogurt, contain probiotics. Probiotics can also be bought as a supplement at a pharmacy, health food store, or grocery store. Talk to your health care provider before starting any  supplement.  What are the side effects of probiotics?  Some people have side effects when taking probiotics. Side effects are usually temporary and may include:  · Gas.  · Bloating.  · Cramping.  Serious side effects are rare.  Follow these instructions at home:    · If you are taking probiotics with antibiotics:  ? Wait at least 2 hours between taking your medicine and the probiotic.  ? Eat foods high in fiber, such as whole grains, beans, and vegetables. These foods can help good bacteria grow.  ? Avoid certain foods as told by your health care provider.  Summary  · Probiotics are the good bacteria and yeasts that live in your body and keep you and your digestive system healthy.  · Certain foods, such as yogurt, contain probiotics.  · Probiotics can be taken as supplements. They can be bought at a pharmacy, health food store, or grocery store. They come in many different forms, strain combinations, and strengths.  · Be sure to talk with your health care provider before taking a probiotic supplement.  This information is not intended to replace advice given to you by your health care provider. Make sure you discuss any questions you have with your health care provider.  Document Released: 07/15/2015 Document Revised: 09/06/2019 Document Reviewed: 01/02/2019  [employer] Patient Education © 2020 [employer]      Diabetes Basics    Diabetes (diabetes mellitus) is a long-term (chronic) disease. It occurs when the body does not properly use sugar (glucose) that is released from food after you eat.  Diabetes may be caused by one or both of these problems:  · Your pancreas does not make enough of a hormone called insulin.  · Your body does not react in a normal way to insulin that it makes.  Insulin lets sugars (glucose) go into cells in your body. This gives you energy. If you have diabetes, sugars cannot get into cells. This causes high blood sugar (hyperglycemia).  Follow these instructions at home:  How is diabetes  treated?  You may need to take insulin or other diabetes medicines daily to keep your blood sugar in balance. Take your diabetes medicines every day as told by your doctor. List your diabetes medicines here:  If you use insulin, you will learn how to give yourself insulin by injection. You may need to adjust the amount based on the food that you eat. List the types of insulin you use here:  How do I manage my blood sugar?    Check your blood sugar levels using a blood glucose monitor as directed by your doctor.  Your doctor will set treatment goals for you. Generally, you should have these blood sugar levels:  · Before meals (preprandial):  mg/dL (4.4-7.2 mmol/L).  · After meals (postprandial): below 180 mg/dL (10 mmol/L).  · A1c level: less than 7%.  Write down the times that you will check your blood sugar levels:  What do I need to know about low blood sugar?  Low blood sugar is called hypoglycemia. This is when blood sugar is at or below 70 mg/dL (3.9 mmol/L). Symptoms may include:  · Feeling:  ? Hungry.  ? Worried or nervous (anxious).  ? Sweaty and clammy.  ? Confused.  ? Dizzy.  ? Sleepy.  ? Sick to your stomach (nauseous).  · Having:  ? A fast heartbeat.  ? A headache.  ? A change in your vision.  ? Tingling or no feeling (numbness) around the mouth, lips, or tongue.  ? Jerky movements that you cannot control (seizure).  · Having trouble with:  ? Moving (coordination).  ? Sleeping.  ? Passing out (fainting).  ? Getting upset easily (irritability).  Treating low blood sugar  To treat low blood sugar, eat or drink something sugary right away. If you can think clearly and swallow safely, follow the 15:15 rule:  · Take 15 grams of a fast-acting carb (carbohydrate). Talk with your doctor about how much you should take.  · Some fast-acting carbs are:  ? Sugar tablets (glucose pills). Take 3-4 glucose pills.  ? 6-8 pieces of hard candy.  ? 4-6 oz (120-150 mL) of fruit juice.  ? 4-6 oz (120-150 mL) of regular  (not diet) soda.  ? 1 Tbsp (15 mL) honey or sugar.  · Check your blood sugar 15 minutes after you take the carb.  · If your blood sugar is still at or below 70 mg/dL (3.9 mmol/L), take 15 grams of a carb again.  · If your blood sugar does not go above 70 mg/dL (3.9 mmol/L) after 3 tries, get help right away.  · After your blood sugar goes back to normal, eat a meal or a snack within 1 hour.  Treating very low blood sugar  If your blood sugar is at or below 54 mg/dL (3 mmol/L), you have very low blood sugar (severe hypoglycemia). This is an emergency. Do not wait to see if the symptoms will go away. Get medical help right away. Call your local emergency services (911 in the [place] Do not drive yourself to the hospital.  Questions to ask your health care provider  · Do I need to meet with a diabetes educator?  · What equipment will I need to care for myself at home?  · What diabetes medicines do I need? When should I take them?  · How often do I need to check my blood sugar?  · What number can I call if I have questions?  · When is my next doctor's visit?  · Where can I find a support group for people with diabetes?  Where to find more information  · American Diabetes Association: www.diabetes.org  · American Association of Diabetes Educators: www.diabeteseducator.org/patient-resources  Contact a doctor if:  · Your blood sugar is at or above 240 mg/dL (13.3 mmol/L) for 2 days in a row.  · You have been sick or have had a fever for 2 days or more, and you are not getting better.  · You have any of these problems for more than 6 hours:  ? You cannot eat or drink.  ? You feel sick to your stomach (nauseous).  ? You throw up (vomit).  ? You have watery poop (diarrhea).  Get help right away if:  · Your blood sugar is lower than 54 mg/dL (3 mmol/L).  · You get confused.  · You have trouble:  ? Thinking clearly.  ? Breathing.  Summary  · Diabetes (diabetes mellitus) is a long-term (chronic) disease. It occurs when the body  does not properly use sugar (glucose) that is released from food after digestion.  · Take insulin and diabetes medicines as told.  · Check your blood sugar every day, as often as told.  · Keep all follow-up visits as told by your doctor. This is important.  This information is not intended to replace advice given to you by your health care provider. Make sure you discuss any questions you have with your health care provider.  Document Released: 03/22/2019 Document Revised: 02/07/2020 Document Reviewed: 03/22/2019  [employer] Patient Education © 2020 [employer]      Crohn's Disease    Crohn's disease is a long-lasting (chronic) disease that affects the gastrointestinal (GI) tract. Crohn's disease often causes irritation and inflammation in the small intestine and the beginning of the large intestine, but it can affect any part of the GI tract. Crohn's disease is part of a group of illnesses that are known as inflammatory bowel disease (IBD).  Crohn's disease may start slowly and get worse over time. Symptoms may come and go. They may also go away for months or even years at a time (remission).  What are the causes?  The exact cause of this condition is not known. It may involve a response that causes your body's disease-fighting (immune) system to attack healthy cells and tissues (autoimmune response). Bacteria, genes, and your environment may also play a role.  What increases the risk?  The following factors may make you more likely to develop this condition:  · Having a family member who has Crohn's disease, another IBD, or an autoimmune condition.  · Using products that contain nicotine or tobacco, such as cigarettes and e-cigarettes.  · Being in your 20s.  · Having Eastern  ancestry.  What are the signs or symptoms?  The main symptoms of this condition involve your GI tract. These include:  · Diarrhea.  · Pain or cramping in the abdomen. This is commonly felt in the lower right side of the  abdomen.  · Frequent watery or bloody stools.  · Constipation. This may mean having:  ? Fewer bowel movements in a week than normal.  ? Difficulty having a bowel movement.  ? Stools that are dry, hard, or larger than normal.  · Rectal bleeding.  · Rectal pain.  · An urgent need to have a bowel movement.  · The feeling that you are not finished having a bowel movement.  Other symptoms may include:  · Unexplained weight loss.  · Fatigue.  · Fever.  · Nausea.  · Loss of appetite.  · Joint pain.  · Vision changes.  · Red bumps or sores on the skin.  · Sores inside the mouth.  How is this diagnosed?  This condition may be diagnosed based on:  · Your symptoms and your medical history.  · A physical exam.  · Tests, which may include:  ? Blood tests.  ? Stool sample tests.  ? Imaging tests, such as X-rays and CT scans.  ? Tests to examine the inside of your intestines using a long, flexible tube that has a light and a camera on the end (endoscopy or colonoscopy).  ? A procedure to remove tissue samples from inside your bowel for testing (biopsy).  You may need to work with a health care provider who specializes in diseases of the digestive tract (gastroenterologist).  How is this treated?  There is no cure for this condition, but treatment can help you manage your symptoms. Crohn's disease affects each person differently. Your treatment may include:  · Resting your bowels. This involves having a period of healing time when your bowels are not passing stools. This may be done by:  ? Drinking only clear liquids. These are liquids that you can see through, such as water, black coffee, fruit juice without pulp, broth, gelatin, and ice pops.  ? Getting nutrition through an IV for a period of time.  · Medicines. These may be used by themselves or with other treatments (combination therapy). These may include antibiotic medicines. You may be given medicines that help to:  ? Reduce inflammation.  ? Control your immune system  activity.  ? Fight infections.  ? Relieve cramps and prevent diarrhea.  ? Control your pain.  · Surgery. You may need surgery if:  ? Medicines and other treatments are not working anymore.  ? You develop complications from severe Crohn's disease.  ? A section of your intestine becomes so damaged that it needs to be removed.  Follow these instructions at home:  Medicines  · Take over-the-counter and prescription medicines only as told by your health care provider.  · If you were prescribed an antibiotic, take it as told by your health care provider. Do not stop taking the antibiotic even if you start to feel better.  · Avoid taking ibuprofen or other NSAID medicines if possible, these can make Crohn's disease worse.  Eating and drinking  · Talk with your health care provider or a diet and nutrition specialist (registered dietitian) about what diet is best for you.  · Drink enough fluid to keep your urine pale yellow.  · If you are taking steroids to reduce inflammation, get plenty of calcium in your diet to help keep your bones healthy. You may also consider taking a calcium supplement with vitamin D.  · Keep a food diary to identify foods that make your symptoms better or worse.  · Avoid foods that cause symptoms.  · Follow instructions from your health care provider about eating or drinking restrictions if you have worsening symptoms (flare-up).  · Limit alcohol intake to no more than 1 drink a day for nonpregnant women and 2 drinks a day for men. One drink equals 12 oz of beer, 5 oz of wine, or 1½ oz of hard liquor.  General instructions  · Make sure you get all the vaccines that your health care provider recommends, especially pneumonia (pneumococcal) and flu (influenza) vaccines.  · Do not use any products that contain nicotine or tobacco, such as cigarettes and e-cigarettes. If you need help quitting, ask your health care provider.  · Exercise every day, or as often told by your health care provider.  · Keep  all follow-up visits as told by your health care provider. This is important.  Contact a health care provider if:  · You have diarrhea, cramps in your abdomen, and other GI problems that are present almost all the time.  · Your symptoms do not improve with treatment.  · You continue to lose weight.  · You develop a rash or sores on your skin.  · You develop eye problems.  · You have a fever.  · Your symptoms get worse.  · You develop new symptoms.  Get help right away if:  · You have bloody diarrhea.  · You have severe pain in your abdomen.  · You cannot pass stools.  Summary  · Crohn's disease affects each person differently. There are multiple treatment options that can help you manage the condition.  · Talk with your health care provider or diet and nutrition specialist (registered dietitian) about what diet is best for you.  · Make sure you get all the vaccines that your health care provider recommends, especially pneumonia (pneumococcal) and flu (influenza) vaccines.  This information is not intended to replace advice given to you by your health care provider. Make sure you discuss any questions you have with your health care provider.  Document Released: 09/27/2006 Document Revised: 11/30/2018 Document Reviewed: 08/20/2018  [employer] Patient Education © 2020 [employer]

## 2021-09-09 NOTE — PROGRESS NOTES
Chief Complaint   Patient presents with   • Follow-Up     [facility] for lab results and colonoscopy results       Subjective:     HPI:   Hank Vazquez is a 60 y.o. male here to discuss the evaluation and management of:      Assessment and Plan:     Type 2 diabetes mellitus (HCC)  This is a chronic condition.  Patient is currently taking Metformin 1000 mg twice daily and Amaryl 4 mg daily.  He denies any hypoglycemic events.  Reviewed home blood sugar log.  Patient is tolerating his medications well.  Component      Latest Ref Rng & Units 6/29/2021   Glycohemoglobin      4.0 - 5.6 % 6.8 (H)      Discussed with patient diet and exercise modifications.  He will continue on current medication and treatment plan.    Lymphocytic colitis  Patient was seen by GI consultants for colonoscopy and diagnosed with lymphocytic colitis.  He is currently taking Budesonide 3 mg taper dosing and it is tolerating it well.  He does report that he has had improvement in his diarrhea symptoms.  He denies any nausea, vomiting, or blood in his stools.    He will continue on current medications as prescribed by GI consultants.  Discussed with patient the importance of starting probiotic.  We will continue to monitor at future appointments.    Essential hypertension  Chronic stable condition.  Blood pressure is at goal at 120/70.  Patient denies any lightheadedness, vision changes, headaches, palpitations, or leg swelling.          ROS:  Gen: no fevers/chills, no changes in weight  Eyes: no changes in vision  ENT: no sore throat, no hearing loss, no bloody nose  Pulm: no sob, no cough  CV: no chest pain, no palpitations  GI: Positive per HPI  Neuro: no headaches, no numbness/tingling  Heme/Lymph: no easy bruising    No Known Allergies    Current medicines (including changes today)  Current Outpatient Medications   Medication Sig Dispense Refill   • atorvastatin (LIPITOR) 20 MG Tab Take 1 Tablet by mouth every evening. 90 Tablet 3   • glucose blood  (COOL BLOOD GLUCOSE TEST STRIPS) strip 1 Strip by Other route 2 times a day as needed (True metrix B/G test strips 50S). 100 Strip 11   • Lancets 28G Misc      • glimepiride (AMARYL) 4 MG Tab TAKE 1 TABLET BY MOUTH DAILY     • Fenugreek 610 MG Cap Take 610 mg by mouth every day.     • metFORMIN ER (GLUCOPHAGE XR) 500 MG TABLET SR 24 HR Take 2 Tablets by mouth every day. 180 tablet 3     No current facility-administered medications for this visit.       Social History     Tobacco Use   • Smoking status: Former Smoker   • Smokeless tobacco: Never Used   Vaping Use   • Vaping Use: Never used   Substance Use Topics   • Alcohol use: Not Currently   • Drug use: Not Currently       Patient Active Problem List    Diagnosis Date Noted   • Type 2 diabetes mellitus (HCC) 06/29/2021   • Lymphocytic colitis 06/29/2021   • Essential hypertension 04/16/2015       Family History   Problem Relation Age of Onset   • Heart Attack Mother    • Alzheimer's Disease Mother    • Arthritis Father    • Gout Father    • Alzheimer's Disease Maternal Grandfather           Objective:     No visits with results within 1 Month(s) from this visit.   Latest known visit with results is:   Hospital Outpatient Visit on 06/30/2021   Component Date Value Ref Range Status   • Significant Indicator 06/30/2021 NEG   Final   • Source 06/30/2021 STL   Final   • Site 06/30/2021 STOOL   Final   • Culture Result 06/30/2021    Final                    Value:No enteric pathogens isolated.  NOTE:  Stool cultures are screened for Shiga Toxins 1 and 2,  Salmonella, Shigella, Campylobacter, Aeromonas,  Plesiomonas, and Vibrio.     • EHEC 06/30/2021 Negative for Shiga Toxin 1 and 2.   Final   • Campylobactor Antigen 06/30/2021    Final                    Value:Negative for Campylobacter Antigen.  Test results are to be used in conjunction with information  available from the patient clinical evaluation and other  diagnostic procedures.     • Lactoferrin, Fecal by ANNELISE  "06/30/2021 Positive* Negative Final    Comment: A positive result is indicative of the presence of lactoferrin, a  marker for fecal leukocytes.  Performed By: [facility]  [address] [place] [id number]  : Mine Mitchell MD     • Significant Indicator 06/30/2021 NEG   Final   • Source 06/30/2021 STL   Final   • Site 06/30/2021 STOOL   Final   • Ova And Parasites Antigen Eia 06/30/2021    Final                    Value:Negative for Giardia lamblia antigen.  Negative for Cryptosporidium parvum antigen.  NOTE:  The Cryptosporidium/Giardia assay is a rapid test for the  presence or absence of these specific antigens.  In special  circumstances, a physician may need to request a complete  ova and parasite procedure when the patient meets certain  criteria. For example, recent travel abroad,immunosupression,  recent immigration, persistent undiagnosed diarrhea, or  persistent unexplained eosinophilia may be conditions to  warrant a complete ova and parasite examination.  In these  special cases, or if the physician suspects another specific  gastrointestinal parasite,the Microbiology Department can  perform a complete ova and parasite microscopic examination.  The request for a complete ova and parasite examination must  come directly from the physician (or designee) within the  seven days of the original stool specimen being received in  the Microbiology Department.  Stool specimens are discarded  after                           seven days of storage.     • Significant Indicator 06/30/2021 NEG   Final   • Source 06/30/2021 STL   Final   • Site 06/30/2021 STOOL   Final   • EHEC 06/30/2021 Negative for Shiga Toxin 1 and 2.   Final       /70 (BP Location: Left arm, Patient Position: Sitting, BP Cuff Size: Adult)   Pulse (!) 56   Temp 36.4 °C (97.6 °F) (Temporal)   Resp 16   Ht 1.676 m (5' 6\")   Wt 90.6 kg (199 lb 12.8 oz)   SpO2 92%  Body mass index is 32.25 " kg/m².    Physical Exam:  Constitutional: Well-developed and well-nourished male in NAD. Not diaphoretic. No distress.   Skin: warm, dry, intact, no evidence of rash or concerning lesions  Cardiovascular: Regular rate and rhythm without murmur. Radial pulses are intact and equal bilaterally.  Pulmonary: Clear to ausculation. Normal effort. No rales, ronchi, or wheezing.  Abdomen: Soft, non tender, and without distention. Active bowel sounds in all four quadrants. No rebound, guarding, masses   Extremities: No cyanosis, clubbing, erythema, nor edema.   Neurological: Alert and oriented x 3. No cranial nerve deficit. 5/5 myotomes. Sensation intact.   Psychiatric:  Behavior, mood, and affect are appropriate.       The following treatment plan was discussed:    1. Type 2 diabetes mellitus with hyperglycemia, without long-term current use of insulin (HCC)  Diabetes currently controlled.  Continue current medications.   Labwork as indicated.    - Comp Metabolic Panel; Future  - HEMOGLOBIN A1C; Future  - atorvastatin (LIPITOR) 20 MG Tab; Take 1 Tablet by mouth every evening.  Dispense: 90 Tablet; Refill: 3  - POCT Retinal Eye Exam  - Microalbumin Creat Ratio Urine - Lab Collect; Future    2. Lymphocytic colitis  This is a new diagnosis.  Discussed with patient the importance of continuing his medications as prescribed by his GI specialist.  Discussed with him multiple dietary changes to help decrease colitis symptoms.  Encourage patient to start daily probiotic.    3. Essential hypertension  Chronic stable condition.  Blood pressure well controlled.  We will continue to monitor future appointments.     Any change or worsening of signs or symptoms, patient encouraged to follow-up or report to emergency room for further evaluation. Patient verbalizes understanding and agrees.    Follow-Up: Return in about 3 months (around 12/9/2021) for DM, Follow up labs.      PLEASE NOTE: This dictation was created using voice recognition  software. I have made every reasonable attempt to correct obvious errors, but I expect that there are errors of grammar and possibly content that I did not discover before finalizing the note.

## 2021-09-09 NOTE — ASSESSMENT & PLAN NOTE
This is a chronic condition.  Patient is currently taking Metformin 1000 mg twice daily and Amaryl 4 mg daily.  He denies any hypoglycemic events.  Reviewed home blood sugar log.  Patient is tolerating his medications well.  Component      Latest Ref Rng & Units 6/29/2021   Glycohemoglobin      4.0 - 5.6 % 6.8 (H)      Discussed with patient diet and exercise modifications.  He will continue on current medication and treatment plan.

## 2021-09-09 NOTE — ASSESSMENT & PLAN NOTE
Patient was seen by GI consultants for colonoscopy and diagnosed with lymphocytic colitis.  He is currently taking Budesonide 3 mg taper dosing and it is tolerating it well.  He does report that he has had improvement in his diarrhea symptoms.  He denies any nausea, vomiting, or blood in his stools.    He will continue on current medications as prescribed by GI consultants.  Discussed with patient the importance of starting probiotic.  We will continue to monitor at future appointments.

## 2021-09-09 NOTE — ASSESSMENT & PLAN NOTE
Chronic stable condition.  Blood pressure is at goal at 120/70.  Patient denies any lightheadedness, vision changes, headaches, palpitations, or leg swelling.

## 2021-12-01 ENCOUNTER — HOSPITAL ENCOUNTER (OUTPATIENT)
Dept: LAB | Facility: MEDICAL CENTER | Age: 61
End: 2021-12-01
Attending: NURSE PRACTITIONER
Payer: COMMERCIAL

## 2021-12-01 ENCOUNTER — HOSPITAL ENCOUNTER (OUTPATIENT)
Facility: MEDICAL CENTER | Age: 61
End: 2021-12-01
Attending: NURSE PRACTITIONER
Payer: COMMERCIAL

## 2021-12-01 DIAGNOSIS — E11.65 TYPE 2 DIABETES MELLITUS WITH HYPERGLYCEMIA, WITHOUT LONG-TERM CURRENT USE OF INSULIN (HCC): ICD-10-CM

## 2021-12-01 LAB
ALBUMIN SERPL BCP-MCNC: 4.6 G/DL (ref 3.2–4.9)
ALBUMIN/GLOB SERPL: 1.5 G/DL
ALP SERPL-CCNC: 67 U/L (ref 30–99)
ALT SERPL-CCNC: 90 U/L (ref 2–50)
ANION GAP SERPL CALC-SCNC: 10 MMOL/L (ref 7–16)
AST SERPL-CCNC: 41 U/L (ref 12–45)
BILIRUB SERPL-MCNC: 1.2 MG/DL (ref 0.1–1.5)
BUN SERPL-MCNC: 16 MG/DL (ref 8–22)
CALCIUM SERPL-MCNC: 9.8 MG/DL (ref 8.5–10.5)
CHLORIDE SERPL-SCNC: 103 MMOL/L (ref 96–112)
CO2 SERPL-SCNC: 26 MMOL/L (ref 20–33)
CREAT SERPL-MCNC: 0.96 MG/DL (ref 0.5–1.4)
EST. AVERAGE GLUCOSE BLD GHB EST-MCNC: 197 MG/DL
FASTING STATUS PATIENT QL REPORTED: NORMAL
GLOBULIN SER CALC-MCNC: 3 G/DL (ref 1.9–3.5)
GLUCOSE SERPL-MCNC: 269 MG/DL (ref 65–99)
HBA1C MFR BLD: 8.5 % (ref 4–5.6)
POTASSIUM SERPL-SCNC: 4.9 MMOL/L (ref 3.6–5.5)
PROT SERPL-MCNC: 7.6 G/DL (ref 6–8.2)
SODIUM SERPL-SCNC: 139 MMOL/L (ref 135–145)

## 2021-12-01 PROCEDURE — 82570 ASSAY OF URINE CREATININE: CPT

## 2021-12-01 PROCEDURE — 82043 UR ALBUMIN QUANTITATIVE: CPT

## 2021-12-01 PROCEDURE — 82274 ASSAY TEST FOR BLOOD FECAL: CPT

## 2021-12-01 PROCEDURE — 36415 COLL VENOUS BLD VENIPUNCTURE: CPT

## 2021-12-01 PROCEDURE — 83036 HEMOGLOBIN GLYCOSYLATED A1C: CPT

## 2021-12-01 PROCEDURE — 80053 COMPREHEN METABOLIC PANEL: CPT

## 2021-12-02 LAB
CREAT UR-MCNC: 231.61 MG/DL
MICROALBUMIN UR-MCNC: 27.6 MG/DL
MICROALBUMIN/CREAT UR: 119 MG/G (ref 0–30)

## 2021-12-05 DIAGNOSIS — R19.7 DIARRHEA, UNSPECIFIED TYPE: ICD-10-CM

## 2021-12-05 LAB — AMBIGUOUS SPECIMEN AMBIS: NORMAL

## 2021-12-08 LAB — IMM ASSAY OCC BLD FITOB: NEGATIVE

## 2021-12-13 ENCOUNTER — OFFICE VISIT (OUTPATIENT)
Dept: MEDICAL GROUP | Facility: PHYSICIAN GROUP | Age: 61
End: 2021-12-13
Payer: COMMERCIAL

## 2021-12-13 VITALS
HEART RATE: 73 BPM | RESPIRATION RATE: 16 BRPM | TEMPERATURE: 97.9 F | WEIGHT: 196 LBS | BODY MASS INDEX: 31.5 KG/M2 | SYSTOLIC BLOOD PRESSURE: 126 MMHG | DIASTOLIC BLOOD PRESSURE: 64 MMHG | OXYGEN SATURATION: 99 % | HEIGHT: 66 IN

## 2021-12-13 DIAGNOSIS — Z23 NEED FOR VACCINATION: ICD-10-CM

## 2021-12-13 DIAGNOSIS — Z11.59 ENCOUNTER FOR HCV SCREENING TEST FOR LOW RISK PATIENT: ICD-10-CM

## 2021-12-13 DIAGNOSIS — I10 ESSENTIAL HYPERTENSION: ICD-10-CM

## 2021-12-13 DIAGNOSIS — E11.65 TYPE 2 DIABETES MELLITUS WITH HYPERGLYCEMIA, WITHOUT LONG-TERM CURRENT USE OF INSULIN (HCC): ICD-10-CM

## 2021-12-13 PROCEDURE — 99214 OFFICE O/P EST MOD 30 MIN: CPT | Mod: 25 | Performed by: NURSE PRACTITIONER

## 2021-12-13 PROCEDURE — 90471 IMMUNIZATION ADMIN: CPT | Performed by: NURSE PRACTITIONER

## 2021-12-13 PROCEDURE — 90686 IIV4 VACC NO PRSV 0.5 ML IM: CPT | Performed by: NURSE PRACTITIONER

## 2021-12-13 RX ORDER — GLIMEPIRIDE 4 MG/1
4 TABLET ORAL EVERY MORNING
Qty: 90 TABLET | Refills: 2 | Status: SHIPPED | OUTPATIENT
Start: 2021-12-13 | End: 2022-10-31 | Stop reason: SDUPTHER

## 2021-12-13 ASSESSMENT — FIBROSIS 4 INDEX: FIB4 SCORE: 1.3

## 2021-12-13 NOTE — ASSESSMENT & PLAN NOTE
Chronic and stable condition.  Patient's blood pressure in clinic is 126/64.  Patient denies any lightheadedness, vision changes, or headaches.  Who was positive for microalbumin in his urine however declines wanting to start ACE or ARB today.  Will reevaluate in 3 months.

## 2021-12-13 NOTE — PROGRESS NOTES
Chief Complaint   Patient presents with   • Lab Results   • Medication Refill      glimepiride         Subjective:     HPI:   Hank Vazquez is a 61 y.o. male here to discuss the evaluation and management of:      Type 2 diabetes mellitus (HCC)  This is a chronic and uncontrolled condition.    Most recent A1c level is 8.5%  Urine microalbumin was slightly elevated.  We will repeat in 3 months.  Patient reports that he ran out of his glipizide 2 months ago.  He has continued to take his Metformin 1000 mg twice daily and Fenugreek 610 mg daily.  He has been tolerating his medications well and denies any adverse effects.   He denies needing refills of Metformin today.  Monofilament exam was performed today.  He is up-to-date on his retinal scans.  Blood pressure today in clinic is 126/64 and is at goal.  Patient is currently not on ACE or ARB and declines starting any medications today.  Currently on statin therapy.      Essential hypertension  Chronic and stable condition.  Patient's blood pressure in clinic is 126/64.  Patient denies any lightheadedness, vision changes, or headaches.  Who was positive for microalbumin in his urine however declines wanting to start ACE or ARB today.  Will reevaluate in 3 months.          ROS:  Gen: no fevers/chills, no changes in weight  Eyes: no changes in vision  Pulm: no sob, no cough  CV: no chest pain, no palpitations  GI: no nausea/vomiting, no diarrhea  MSk: no myalgias  Neuro: no headaches, no numbness/tingling      No Known Allergies    Current medicines (including changes today)  Current Outpatient Medications   Medication Sig Dispense Refill   • glimepiride (AMARYL) 4 MG Tab Take 1 Tablet by mouth every morning. TAKE 1 TABLET BY MOUTH DAILY 90 Tablet 2   • atorvastatin (LIPITOR) 20 MG Tab Take 1 Tablet by mouth every evening. 90 Tablet 3   • budesonide (ENTOCORT EC) 3 MG Cap DR Particles capsule      • glucose blood (COOL BLOOD GLUCOSE TEST STRIPS) strip 1 Strip by Other  route 2 times a day as needed (True metrix B/G test strips 50S). 100 Strip 11   • Fenugreek 610 MG Cap Take 610 mg by mouth every day.     • metFORMIN ER (GLUCOPHAGE XR) 500 MG TABLET SR 24 HR Take 2 Tablets by mouth every day. 180 tablet 3     No current facility-administered medications for this visit.       Social History     Tobacco Use   • Smoking status: Former Smoker   • Smokeless tobacco: Never Used   Vaping Use   • Vaping Use: Never used   Substance Use Topics   • Alcohol use: Not Currently   • Drug use: Not Currently       Patient Active Problem List    Diagnosis Date Noted   • Type 2 diabetes mellitus (HCC) 06/29/2021   • Lymphocytic colitis 06/29/2021   • Essential hypertension 04/16/2015       Family History   Problem Relation Age of Onset   • Heart Attack Mother    • Alzheimer's Disease Mother    • Arthritis Father    • Gout Father    • Alzheimer's Disease Maternal Grandfather           Objective:     Hospital Outpatient Visit on 12/01/2021   Component Date Value Ref Range Status   • Occult Blood, IA 12/01/2021 Negative   Final    Comment: INTERPRETIVE INFORMATION: Fecal Occult Blood by Immunoassay  No single cutoff provides superior colorectal cancer detection  rates. The test  recommends the use of a 100 ng/mL  cutoff that produces a specificity of approximately 95 percent for  the detection of lower gastrointestinal bleeding. This test does  not detect upper gastrointestinal bleeding.  Performed By: [facility]  [address] [place] [id number]  : Mine Mitchell MD     • Ambiguous Specimen 12/05/2021 See Below:   Final    Comment: An unlabeled specimen for FITOB testing was received from the patient in a  sealed envelope. The specimen was identified using the demographic  information on the accompanying lab order which was also in the sealed  envelope.     Hospital Outpatient Visit on 12/01/2021   Component Date Value Ref Range Status   • Creatinine,  "Urine 12/01/2021 231.61  mg/dL Final   • Microalbumin, Urine Random 12/01/2021 27.6  mg/dL Final   • Micro Alb Creat Ratio 12/01/2021 119* 0 - 30 mg/g Final   • Glycohemoglobin 12/01/2021 8.5* 4.0 - 5.6 % Final    Comment: Increased risk for diabetes:  5.7 -6.4%  Diabetes:  >6.4%  Glycemic control for adults with diabetes:  <7.0%    The above interpretations are per ADA guidelines.  Diagnosis  of diabetes mellitus on the basis of elevated Hemoglobin A1c  should be confirmed by repeating the Hb A1c test.     • Est Avg Glucose 12/01/2021 197  mg/dL Final    Comment: The eAG calculation is based on the A1c-Derived Daily Glucose  (ADAG) study.  See the ADA's website for additional information.     • Sodium 12/01/2021 139  135 - 145 mmol/L Final   • Potassium 12/01/2021 4.9  3.6 - 5.5 mmol/L Final   • Chloride 12/01/2021 103  96 - 112 mmol/L Final   • Co2 12/01/2021 26  20 - 33 mmol/L Final   • Anion Gap 12/01/2021 10.0  7.0 - 16.0 Final   • Glucose 12/01/2021 269* 65 - 99 mg/dL Final   • Bun 12/01/2021 16  8 - 22 mg/dL Final   • Creatinine 12/01/2021 0.96  0.50 - 1.40 mg/dL Final   • Calcium 12/01/2021 9.8  8.5 - 10.5 mg/dL Final   • AST(SGOT) 12/01/2021 41  12 - 45 U/L Final   • ALT(SGPT) 12/01/2021 90* 2 - 50 U/L Final   • Alkaline Phosphatase 12/01/2021 67  30 - 99 U/L Final   • Total Bilirubin 12/01/2021 1.2  0.1 - 1.5 mg/dL Final   • Albumin 12/01/2021 4.6  3.2 - 4.9 g/dL Final   • Total Protein 12/01/2021 7.6  6.0 - 8.2 g/dL Final   • Globulin 12/01/2021 3.0  1.9 - 3.5 g/dL Final   • A-G Ratio 12/01/2021 1.5  g/dL Final   • Fasting Status 12/01/2021 Fasting   Final   • GFR If  12/01/2021 >60  >60 mL/min/1.73 m 2 Final   • GFR If Non  12/01/2021 >60  >60 mL/min/1.73 m 2 Final       /64 (BP Location: Left arm, Patient Position: Sitting, BP Cuff Size: Adult)   Pulse 73   Temp 36.6 °C (97.9 °F) (Temporal)   Resp 16   Ht 1.676 m (5' 6\")   Wt 88.9 kg (196 lb)   SpO2 99%  Body " mass index is 31.64 kg/m².    Physical Exam:  Constitutional: Well-developed and well-nourished male in NAD. Not diaphoretic. No distress.   Skin: warm, dry, intact, no evidence of rash or concerning lesions  Cardiovascular: Regular rate and rhythm without murmur. Radial pulses are intact and equal bilaterally.  Pulmonary: Clear to ausculation. Normal effort. No rales, ronchi, or wheezing.  Abdomen: Soft, non tender, and without distention. Active bowel sounds in all four quadrants. No rebound, guarding, masses   Extremities: No cyanosis, clubbing, erythema, nor edema.   Monofilament testing with a 10 gram force: sensation intact: intact bilaterally  Visual Inspection: Feet without maceration, ulcers, fissures.  Pedal pulses: intact bilaterally    Neurological: Alert and oriented x 3. No cranial nerve deficit. 5/5 myotomes. Sensation intact.   Psychiatric:  Behavior, mood, and affect are appropriate.    Assessment and Plan:     The following treatment plan was discussed:  I have reviewed all labs with patient and answered all questions.    1. Type 2 diabetes mellitus with hyperglycemia, without long-term current use of insulin (HCC)  Currently uncontrolled.  Patient will restart glipizide 4 mg tablet daily.  He will continue taking Metformin and fenugreek.  Appropriately on statin therapy.  Monofilament exam was performed today in clinic.  Discussed appropriate diet and lifestyle modifications.  - glimepiride (AMARYL) 4 MG Tab; Take 1 Tablet by mouth every morning. TAKE 1 TABLET BY MOUTH DAILY  Dispense: 90 Tablet; Refill: 2  - Microalbumin Creat Ratio Urine - Clinic Collect; Future  - HEMOGLOBIN A1C; Future  - Comp Metabolic Panel; Future  - Diabetic Monofilament Lower Extremity Exam    2. Essential hypertension  Well-controlled on current regimen.  Labs as indicated.  Diet controlled.  Patient sates he was taken off medications a couple years ago and declines wanting to start any today.  Will reassess at future  appointments.  Discussed decreasing salt intake.  Emphasized benefits of exercise and diet. Continue to monitor.  3. Encounter for HCV screening test for low risk patient  - HCV Scrn ( [id number] 1xLife); Future    4. Need for vaccination  - INFLUENZA VACCINE QUAD INJ (PF)    Any change or worsening of signs or symptoms, patient encouraged to follow-up or report to emergency room for further evaluation. Patient verbalizes understanding and agrees.    Follow-Up: Return in about 3 months (around 3/13/2022) for Follow up labs, DM.      PLEASE NOTE: This dictation was created using voice recognition software. I have made every reasonable attempt to correct obvious errors, but I expect that there are errors of grammar and possibly content that I did not discover before finalizing the note.

## 2021-12-13 NOTE — ASSESSMENT & PLAN NOTE
This is a chronic and uncontrolled condition.    Most recent A1c level is 8.5%  Urine microalbumin was slightly elevated.  We will repeat in 3 months.  Patient reports that he ran out of his glipizide 2 months ago.  He has continued to take his Metformin 1000 mg twice daily and Fenugreek 610 mg daily.  He has been tolerating his medications well and denies any adverse effects.   He denies needing refills of Metformin today.  Monofilament exam was performed today.  He is up-to-date on his retinal scans.  Blood pressure today in clinic is 126/64 and is at goal.  Patient is currently not on ACE or ARB and declines starting any medications today.  Currently on statin therapy.

## 2021-12-13 NOTE — PATIENT INSTRUCTIONS
Diabetes Mellitus and Foot Care  Foot care is an important part of your health, especially when you have diabetes. Diabetes may cause you to have problems because of poor blood flow (circulation) to your feet and legs, which can cause your skin to:  · Become thinner and drier.  · Break more easily.  · Heal more slowly.  · Peel and crack.  You may also have nerve damage (neuropathy) in your legs and feet, causing decreased feeling in them. This means that you may not notice minor injuries to your feet that could lead to more serious problems. Noticing and addressing any potential problems early is the best way to prevent future foot problems.  How to care for your feet  Foot hygiene  · Wash your feet daily with warm water and mild soap. Do not use hot water. Then, pat your feet and the areas between your toes until they are completely dry. Do not soak your feet as this can dry your skin.  · Trim your toenails straight across. Do not dig under them or around the cuticle. File the edges of your nails with an emery board or nail file.  · Apply a moisturizing lotion or petroleum jelly to the skin on your feet and to dry, brittle toenails. Use lotion that does not contain alcohol and is unscented. Do not apply lotion between your toes.  Shoes and socks  · Wear clean socks or stockings every day. Make sure they are not too tight. Do not wear knee-high stockings since they may decrease blood flow to your legs.  · Wear shoes that fit properly and have enough cushioning. Always look in your shoes before you put them on to be sure there are no objects inside.  · To break in new shoes, wear them for just a few hours a day. This prevents injuries on your feet.  Wounds, scrapes, corns, and calluses  · Check your feet daily for blisters, cuts, bruises, sores, and redness. If you cannot see the bottom of your feet, use a mirror or ask someone for help.  · Do not cut corns or calluses or try to remove them with medicine.  · If you  find a minor scrape, cut, or break in the skin on your feet, keep it and the skin around it clean and dry. You may clean these areas with mild soap and water. Do not clean the area with peroxide, alcohol, or iodine.  · If you have a wound, scrape, corn, or callus on your foot, look at it several times a day to make sure it is healing and not infected. Check for:  ? Redness, swelling, or pain.  ? Fluid or blood.  ? Warmth.  ? Pus or a bad smell.  General instructions  · Do not cross your legs. This may decrease blood flow to your feet.  · Do not use heating pads or hot water bottles on your feet. They may burn your skin. If you have lost feeling in your feet or legs, you may not know this is happening until it is too late.  · Protect your feet from hot and cold by wearing shoes, such as at the beach or on hot pavement.  · Schedule a complete foot exam at least once a year (annually) or more often if you have foot problems. If you have foot problems, report any cuts, sores, or bruises to your health care provider immediately.  Contact a health care provider if:  · You have a medical condition that increases your risk of infection and you have any cuts, sores, or bruises on your feet.  · You have an injury that is not healing.  · You have redness on your legs or feet.  · You feel burning or tingling in your legs or feet.  · You have pain or cramps in your legs and feet.  · Your legs or feet are numb.  · Your feet always feel cold.  · You have pain around a toenail.  Get help right away if:  · You have a wound, scrape, corn, or callus on your foot and:  ? You have pain, swelling, or redness that gets worse.  ? You have fluid or blood coming from the wound, scrape, corn, or callus.  ? Your wound, scrape, corn, or callus feels warm to the touch.  ? You have pus or a bad smell coming from the wound, scrape, corn, or callus.  ? You have a fever.  ? You have a red line going up your leg.  Summary  · Check your feet every day  for cuts, sores, red spots, swelling, and blisters.  · Moisturize feet and legs daily.  · Wear shoes that fit properly and have enough cushioning.  · If you have foot problems, report any cuts, sores, or bruises to your health care provider immediately.  · Schedule a complete foot exam at least once a year (annually) or more often if you have foot problems.  This information is not intended to replace advice given to you by your health care provider. Make sure you discuss any questions you have with your health care provider.  Document Released: 12/15/2001 Document Revised: 01/30/2019 Document Reviewed: 01/19/2018  [employer] Patient Education © 2020 [employer]      Fiber Content in Foods    See the following list for the dietary fiber content of some common foods.  High-fiber foods  High-fiber foods contain 4 grams or more (4g or more) of fiber per serving. They include:  · Artichoke (fresh) -- 1 medium has 10.3g of fiber.  · Baked beans, plain or vegetarian (canned) -- ½ cup has 5.2g of fiber.  · Blackberries or raspberries (fresh) -- ½ cup has 4g of fiber.  · Bran cereal -- ½ cup has 8.6g of fiber.  · Bulgur (cooked) -- ½ cup has 4g of fiber.  · Kidney beans (canned) -- ½ cup has 6.8g of fiber.  · Lentils (cooked) -- ½ cup has 7.8g of fiber.  · Pear (fresh) -- 1 medium has 5.1g of fiber.  · Peas (frozen) -- ½ cup has 4.4g of fiber.  · Maguire beans (canned) -- ½ cup has 5.5g of fiber.  · Maguire beans (dried and cooked) -- ½ cup has 7.7g of fiber.  · Potato with skin (baked) -- 1 medium has 4.4g of fiber.  · Quinoa (cooked) -- ½ cup has 5g of fiber.  · Soybeans (canned, frozen, or fresh) -- ½ cup has 5.1g of fiber.  Moderate-fiber foods  Moderate-fiber foods contain 1-4 grams (1-4g) of fiber per serving. They include:  · Almonds -- 1 oz. has 3.5g of fiber.  · Apple with skin -- 1 medium has 3.3g of fiber.  · Applesauce, sweetened -- ½ cup has 1.5g of fiber.  · Bagel, plain -- one 4-inch (10-cm) bagel has 2g of  fiber.  · Banana -- 1 medium has 3.1g of fiber.  · Broccoli (cooked) -- ½ cup has 2.5g of fiber.  · Carrots (cooked) -- ½ cup has 2.3g of fiber.  · Corn (canned or frozen) -- ½ cup has 2.1g of fiber.  · Corn tortilla -- one 6-inch (15-cm) tortilla has 1.5g of fiber.  · Green beans (canned) -- ½ cup has 2g of fiber.  · Instant oatmeal -- ½ cup has about 2g of fiber.  · Long-grain brown rice (cooked) -- 1 cup has 3.5g of fiber.  · Macaroni, enriched (cooked) -- 1 cup has 2.5g of fiber.  · Melon -- 1 cup has 1.4g of fiber.  · Multigrain cereal -- ½ cup has about 2-4g of fiber.  · Orange -- 1 small has 3.1g of fiber.  · Potatoes, mashed -- ½ cup has 1.6g of fiber.  · Raisins -- 1/4 cup has 1.6g of fiber.  · Squash -- ½ cup has 2.9g of fiber.  · Sunflower seeds -- ¼ cup has 1.1g of fiber.  · Tomato -- 1 medium has 1.5g of fiber.  · Vegetable or soy nicky -- 1 has 3.4g of fiber.  · Whole-wheat bread -- 1 slice has 2g of fiber.  · Whole-wheat spaghetti -- ½ cup has 3.2g of fiber.  Low-fiber foods  Low-fiber foods contain less than 1 gram (less than 1g) of fiber per serving. They include:  · Egg -- 1 large.  · Flour tortilla -- one 6-inch (15-cm) tortilla.  · Fruit juice -- ½ cup.  · Lettuce -- 1 cup.  · Meat, poultry, or fish -- 1 oz.  · Milk -- 1 cup.  · Spinach (raw) -- 1 cup.  · White bread -- 1 slice.  · White rice -- ½ cup.  · Yogurt -- ¾ cup.  Actual amounts of fiber in foods may be different depending on processing. Talk with your dietitian about how much fiber you need in your diet.  This information is not intended to replace advice given to you by your health care provider. Make sure you discuss any questions you have with your health care provider.  Document Released: 05/05/2008 Document Revised: 08/10/2017 Document Reviewed: 02/09/2017  [employer] Patient Education © 2020 [employer]

## 2022-01-22 ENCOUNTER — OFFICE VISIT (OUTPATIENT)
Dept: URGENT CARE | Facility: PHYSICIAN GROUP | Age: 62
End: 2022-01-22
Payer: COMMERCIAL

## 2022-01-22 ENCOUNTER — HOSPITAL ENCOUNTER (OUTPATIENT)
Facility: MEDICAL CENTER | Age: 62
End: 2022-01-22
Attending: FAMILY MEDICINE
Payer: COMMERCIAL

## 2022-01-22 VITALS
RESPIRATION RATE: 16 BRPM | DIASTOLIC BLOOD PRESSURE: 68 MMHG | HEART RATE: 64 BPM | BODY MASS INDEX: 31.18 KG/M2 | SYSTOLIC BLOOD PRESSURE: 120 MMHG | WEIGHT: 194 LBS | HEIGHT: 66 IN | TEMPERATURE: 97.3 F | OXYGEN SATURATION: 97 %

## 2022-01-22 DIAGNOSIS — Z11.52 ENCOUNTER FOR SCREENING FOR COVID-19: ICD-10-CM

## 2022-01-22 DIAGNOSIS — R05.9 COUGH: ICD-10-CM

## 2022-01-22 PROCEDURE — U0003 INFECTIOUS AGENT DETECTION BY NUCLEIC ACID (DNA OR RNA); SEVERE ACUTE RESPIRATORY SYNDROME CORONAVIRUS 2 (SARS-COV-2) (CORONAVIRUS DISEASE [COVID-19]), AMPLIFIED PROBE TECHNIQUE, MAKING USE OF HIGH THROUGHPUT TECHNOLOGIES AS DESCRIBED BY CMS-2020-01-R: HCPCS

## 2022-01-22 PROCEDURE — 99213 OFFICE O/P EST LOW 20 MIN: CPT | Performed by: FAMILY MEDICINE

## 2022-01-22 PROCEDURE — U0005 INFEC AGEN DETEC AMPLI PROBE: HCPCS

## 2022-01-22 ASSESSMENT — FIBROSIS 4 INDEX: FIB4 SCORE: 1.3

## 2022-01-22 NOTE — PROGRESS NOTES
Chief Complaint:    Chief Complaint   Patient presents with   • Cough     x2-3 days        History of Present Illness:    Symptoms since 1/20/22. Has raspy voice and cough. Cough is better today. Would like Covid test.      Past Medical History:    Past Medical History:   Diagnosis Date   • Arthritis    • Diabetes (HCC)    • Essential hypertension 4/16/2015     Past Surgical History:    Past Surgical History:   Procedure Laterality Date   • OTHER      tubes in ears as child   • OTHER ORTHOPEDIC SURGERY      Left elbow ORIF   • TONSILLECTOMY       Social History:    Social History     Socioeconomic History   • Marital status:      Spouse name: Not on file   • Number of children: Not on file   • Years of education: Not on file   • Highest education level: Not on file   Occupational History   • Not on file   Tobacco Use   • Smoking status: Former Smoker   • Smokeless tobacco: Never Used   Vaping Use   • Vaping Use: Never used   Substance and Sexual Activity   • Alcohol use: Not Currently   • Drug use: Not Currently   • Sexual activity: Yes     Partners: Female   Other Topics Concern   • Not on file   Social History Narrative   • Not on file     Social Determinants of Health     Financial Resource Strain:    • Difficulty of Paying Living Expenses: Not on file   Food Insecurity:    • Worried About Running Out of Food in the Last Year: Not on file   • Ran Out of Food in the Last Year: Not on file   Transportation Needs:    • Lack of Transportation (Medical): Not on file   • Lack of Transportation (Non-Medical): Not on file   Physical Activity:    • Days of Exercise per Week: Not on file   • Minutes of Exercise per Session: Not on file   Stress:    • Feeling of Stress : Not on file   Social Connections:    • Frequency of Communication with Friends and Family: Not on file   • Frequency of Social Gatherings with Friends and Family: Not on file   • Attends Mosque Services: Not on file   • Active Member of Clubs or  "Organizations: Not on file   • Attends Club or Organization Meetings: Not on file   • Marital Status: Not on file   Intimate Partner Violence:    • Fear of Current or Ex-Partner: Not on file   • Emotionally Abused: Not on file   • Physically Abused: Not on file   • Sexually Abused: Not on file   Housing Stability:    • Unable to Pay for Housing in the Last Year: Not on file   • Number of Places Lived in the Last Year: Not on file   • Unstable Housing in the Last Year: Not on file     Family History:    Family History   Problem Relation Age of Onset   • Heart Attack Mother    • Alzheimer's Disease Mother    • Arthritis Father    • Gout Father    • Alzheimer's Disease Maternal Grandfather      Medications:    Current Outpatient Medications on File Prior to Visit   Medication Sig Dispense Refill   • glimepiride (AMARYL) 4 MG Tab Take 1 Tablet by mouth every morning. TAKE 1 TABLET BY MOUTH DAILY 90 Tablet 2   • atorvastatin (LIPITOR) 20 MG Tab Take 1 Tablet by mouth every evening. 90 Tablet 3   • budesonide (ENTOCORT EC) 3 MG Cap DR Particles capsule      • glucose blood (COOL BLOOD GLUCOSE TEST STRIPS) strip 1 Strip by Other route 2 times a day as needed (True metrix B/G test strips 50S). 100 Strip 11   • Fenugreek 610 MG Cap Take 610 mg by mouth every day.     • metFORMIN ER (GLUCOPHAGE XR) 500 MG TABLET SR 24 HR Take 2 Tablets by mouth every day. 180 tablet 3     No current facility-administered medications on file prior to visit.     Allergies:    No Known Allergies      Vitals:    Vitals:    01/22/22 1130   BP: 120/68   Pulse: 64   Resp: 16   Temp: 36.3 °C (97.3 °F)   TempSrc: Temporal   SpO2: 97%   Weight: 88 kg (194 lb)   Height: 1.676 m (5' 6\")       Physical Exam:    Constitutional: Vital signs reviewed. Appears well-developed and well-nourished. No acute distress.   Eyes: Sclera white, conjunctivae clear.   ENT: External ears normal. Hearing normal.   Neck: Neck supple.   Cardiovascular: Regular rate and " rhythm. No murmur.  Pulmonary/Chest: Respirations non-labored. Clear to auscultation bilaterally.  Musculoskeletal: Normal gait. No muscular atrophy or weakness.  Neurological: Alert and oriented to person, place, and time. Muscle tone normal. Coordination normal.   Skin: No rashes or lesions. Warm, dry, normal turgor.  Psychiatric: Normal mood and affect. Behavior is normal. Judgment and thought content normal.       Medical Decision Makin. Cough  - SARS-CoV-2 PCR (24 hour In-House): Collect NP swab in VTM; Future    2. Encounter for screening for COVID-19  - SARS-CoV-2 PCR (24 hour In-House): Collect NP swab in VTM; Future      Discussed with him DDX, management options, and risks, benefits, and alternatives to treatment plan agreed upon.    Symptoms since 22. Has raspy voice and cough. Cough is better today. Would like Covid test.    May use over-the-counter medications (such as cold meds) for symptoms as needed.    Agreeable to PCR COVID-19 test obtained. Out of rapid tests today.    Advised test result will show in MyChart.    Patient will follow-up if needed while waiting for test result.

## 2022-01-23 DIAGNOSIS — Z11.52 ENCOUNTER FOR SCREENING FOR COVID-19: ICD-10-CM

## 2022-01-23 DIAGNOSIS — R05.9 COUGH: ICD-10-CM

## 2022-01-23 LAB
COVID ORDER STATUS COVID19: NORMAL
SARS-COV-2 RNA RESP QL NAA+PROBE: DETECTED
SPECIMEN SOURCE: ABNORMAL

## 2022-03-22 ENCOUNTER — HOSPITAL ENCOUNTER (OUTPATIENT)
Dept: LAB | Facility: MEDICAL CENTER | Age: 62
End: 2022-03-22
Attending: NURSE PRACTITIONER
Payer: COMMERCIAL

## 2022-03-22 DIAGNOSIS — Z11.59 ENCOUNTER FOR HCV SCREENING TEST FOR LOW RISK PATIENT: ICD-10-CM

## 2022-03-22 DIAGNOSIS — E11.65 TYPE 2 DIABETES MELLITUS WITH HYPERGLYCEMIA, WITHOUT LONG-TERM CURRENT USE OF INSULIN (HCC): ICD-10-CM

## 2022-03-22 LAB
ALBUMIN SERPL BCP-MCNC: 4.6 G/DL (ref 3.2–4.9)
ALBUMIN/GLOB SERPL: 1.8 G/DL
ALP SERPL-CCNC: 72 U/L (ref 30–99)
ALT SERPL-CCNC: 59 U/L (ref 2–50)
ANION GAP SERPL CALC-SCNC: 10 MMOL/L (ref 7–16)
AST SERPL-CCNC: 32 U/L (ref 12–45)
BILIRUB SERPL-MCNC: 0.9 MG/DL (ref 0.1–1.5)
BUN SERPL-MCNC: 17 MG/DL (ref 8–22)
CALCIUM SERPL-MCNC: 9.7 MG/DL (ref 8.5–10.5)
CHLORIDE SERPL-SCNC: 103 MMOL/L (ref 96–112)
CO2 SERPL-SCNC: 29 MMOL/L (ref 20–33)
CREAT SERPL-MCNC: 0.92 MG/DL (ref 0.5–1.4)
CREAT UR-MCNC: 158.32 MG/DL
EST. AVERAGE GLUCOSE BLD GHB EST-MCNC: 212 MG/DL
GFR SERPLBLD CREATININE-BSD FMLA CKD-EPI: 95 ML/MIN/1.73 M 2
GLOBULIN SER CALC-MCNC: 2.5 G/DL (ref 1.9–3.5)
GLUCOSE SERPL-MCNC: 191 MG/DL (ref 65–99)
HBA1C MFR BLD: 9 % (ref 4–5.6)
HCV AB SER QL: REACTIVE
MICROALBUMIN UR-MCNC: 21.7 MG/DL
MICROALBUMIN/CREAT UR: 137 MG/G (ref 0–30)
POTASSIUM SERPL-SCNC: 5.5 MMOL/L (ref 3.6–5.5)
PROT SERPL-MCNC: 7.1 G/DL (ref 6–8.2)
SODIUM SERPL-SCNC: 142 MMOL/L (ref 135–145)

## 2022-03-22 PROCEDURE — 36415 COLL VENOUS BLD VENIPUNCTURE: CPT

## 2022-03-22 PROCEDURE — 82570 ASSAY OF URINE CREATININE: CPT

## 2022-03-22 PROCEDURE — 82043 UR ALBUMIN QUANTITATIVE: CPT

## 2022-03-22 PROCEDURE — 87522 HEPATITIS C REVRS TRNSCRPJ: CPT

## 2022-03-22 PROCEDURE — G0472 HEP C SCREEN HIGH RISK/OTHER: HCPCS

## 2022-03-22 PROCEDURE — 83036 HEMOGLOBIN GLYCOSYLATED A1C: CPT

## 2022-03-22 PROCEDURE — 80053 COMPREHEN METABOLIC PANEL: CPT

## 2022-03-25 DIAGNOSIS — R76.8 HEPATITIS C ANTIBODY TEST POSITIVE: ICD-10-CM

## 2022-03-25 NOTE — RESULT ENCOUNTER NOTE
Released to jeffery Mckinney,   Just a follow up on our phone conversation.   Labs show Hepatitis C antibody is positive. To check if this is an old cleared infection please get the follow up lab I have ordered the Hep C RNA blood test.     Also labs show the A1c for diabetes is high and there is some protein in the urine due to the diabetes. Your appointment with Ada 4/5/22 is perfect to follow up on this.   Please let me know if any concerns.   Stefania Gimenez M.D.

## 2022-03-26 LAB
HCV RNA SERPL NAA+PROBE-ACNC: ABNORMAL IU/ML
HCV RNA SERPL NAA+PROBE-LOG IU: 7.74 LOG IU/ML
HCV RNA SERPL QL NAA+PROBE: DETECTED

## 2022-04-12 ENCOUNTER — OFFICE VISIT (OUTPATIENT)
Dept: MEDICAL GROUP | Facility: PHYSICIAN GROUP | Age: 62
End: 2022-04-12
Payer: COMMERCIAL

## 2022-04-12 VITALS
OXYGEN SATURATION: 96 % | DIASTOLIC BLOOD PRESSURE: 70 MMHG | WEIGHT: 201.2 LBS | HEART RATE: 65 BPM | BODY MASS INDEX: 31.58 KG/M2 | HEIGHT: 67 IN | RESPIRATION RATE: 20 BRPM | TEMPERATURE: 97.3 F | SYSTOLIC BLOOD PRESSURE: 110 MMHG

## 2022-04-12 DIAGNOSIS — R74.8 ELEVATED LIVER ENZYMES: ICD-10-CM

## 2022-04-12 DIAGNOSIS — I10 ESSENTIAL HYPERTENSION: ICD-10-CM

## 2022-04-12 DIAGNOSIS — E11.65 TYPE 2 DIABETES MELLITUS WITH HYPERGLYCEMIA, WITHOUT LONG-TERM CURRENT USE OF INSULIN (HCC): ICD-10-CM

## 2022-04-12 DIAGNOSIS — R76.8 HEPATITIS C ANTIBODY POSITIVE IN BLOOD: ICD-10-CM

## 2022-04-12 PROCEDURE — 99214 OFFICE O/P EST MOD 30 MIN: CPT | Performed by: NURSE PRACTITIONER

## 2022-04-12 RX ORDER — LISINOPRIL 2.5 MG/1
2.5 TABLET ORAL DAILY
Qty: 90 TABLET | Refills: 1 | Status: SHIPPED | OUTPATIENT
Start: 2022-04-12 | End: 2022-10-31 | Stop reason: SDUPTHER

## 2022-04-12 ASSESSMENT — FIBROSIS 4 INDEX: FIB4 SCORE: 1.25

## 2022-04-12 ASSESSMENT — PATIENT HEALTH QUESTIONNAIRE - PHQ9: CLINICAL INTERPRETATION OF PHQ2 SCORE: 0

## 2022-04-13 NOTE — ASSESSMENT & PLAN NOTE
Patient is well controlled.  Patient does have chronic uncontrolled diabetes with elevated microalbumin.  In-depth conversation with patient in regards to secondary prevention for kidney protection due to type 2 diabetes.    Plan  Patient started on lisinopril 2.5 mg daily.  Side effects of medication were discussed.  Encourage patient to monitor blood pressures once to twice weekly or with symptoms.

## 2022-04-13 NOTE — PROGRESS NOTES
Chief Complaint   Patient presents with   • Follow-Up     labs       Subjective:     HPI:   Hank Vazquez is a 61 y.o. male here to discuss the evaluation and management of:      Type 2 diabetes mellitus (HCC)  Chronic and uncontrolled condition.  Patient's A1c level is 9%.   Reviewed labs with patient and answered all questions.  Patient is positive for microalbumin in urine.  Currently taking Amaryl 4 mg in the morning and 1000 mg Metformin daily.  Patient is intolerant to Metformin twice daily.  Patient does report eating ice cream nightly.  He does not follow a diabetic friendly diet.  He does not regularly exercise.  He is up-to-date on his monofilament and retinal screening.    Not currently on ACE or ARB.  Probably on statin therapy.    Plan  In-depth conversation with patient regards to appropriate diet lifestyle modifications.  Encourage patient to stop eating ice cream nightly and switch to sugar-free.  Discussed insulin therapy or once weekly injectables however patient declined due to needles.  New prescription of Jardiance was sent to pharmacy.  Side effects of medication were discussed.  Started on lisinopril for secondary prevention.  Patient will continue on Metformin 1000 mg daily and Amaryl.  No refills needed at this time.        Essential hypertension  Patient is well controlled.  Patient does have chronic uncontrolled diabetes with elevated microalbumin.  In-depth conversation with patient in regards to secondary prevention for kidney protection due to type 2 diabetes.    Plan  Patient started on lisinopril 2.5 mg daily.  Side effects of medication were discussed.  Encourage patient to monitor blood pressures once to twice weekly or with symptoms.    Hepatitis C antibody positive in blood  This is a new condition.  Reviewed labs with patient indicating positive hepatitis C antibody.  Patient does report a prior history of IV drug use and serving present time.  He reports he thinks a check for  hepatitis C in person and that he was negative.    He denies any nausea, vomiting, yellowing of the skin, or right upper quadrant pain.    Plan  In-depth conversation with patient regards positive hepatitis C antibody.  Encourage patient to get HCV RNA PCR genotype reflex completed.  Possible referral to GI.  Handouts were provided in discharge instructions.    ROS:  Gen: no fevers/chills, no changes in weight  Eyes: no changes in vision  Pulm: no sob, no cough  CV: no chest pain, no palpitations  GI: no nausea/vomiting, no diarrhea  Neuro: no headaches, no numbness/tingling      No Known Allergies    Current medicines (including changes today)  Current Outpatient Medications   Medication Sig Dispense Refill   • Empagliflozin 10 MG Tab Take 1 Tablet by mouth every day. 90 Tablet 3   • lisinopril (PRINIVIL) 2.5 MG Tab Take 1 Tablet by mouth every day. 90 Tablet 1   • glimepiride (AMARYL) 4 MG Tab Take 1 Tablet by mouth every morning. TAKE 1 TABLET BY MOUTH DAILY 90 Tablet 2   • atorvastatin (LIPITOR) 20 MG Tab Take 1 Tablet by mouth every evening. 90 Tablet 3   • glucose blood (COOL BLOOD GLUCOSE TEST STRIPS) strip 1 Strip by Other route 2 times a day as needed (True metrix B/G test strips 50S). 100 Strip 11   • Fenugreek 610 MG Cap Take 610 mg by mouth every day.     • metFORMIN ER (GLUCOPHAGE XR) 500 MG TABLET SR 24 HR Take 2 Tablets by mouth every day. 180 tablet 3   • budesonide (ENTOCORT EC) 3 MG Cap DR Particles capsule  (Patient not taking: Reported on 4/12/2022)       No current facility-administered medications for this visit.          Objective:     Hospital Outpatient Visit on 03/22/2022   Component Date Value Ref Range Status   • Sodium 03/22/2022 142  135 - 145 mmol/L Final   • Potassium 03/22/2022 5.5  3.6 - 5.5 mmol/L Final   • Chloride 03/22/2022 103  96 - 112 mmol/L Final   • Co2 03/22/2022 29  20 - 33 mmol/L Final   • Anion Gap 03/22/2022 10.0  7.0 - 16.0 Final   • Glucose 03/22/2022 191 (A) 65 - 99  mg/dL Final   • Bun 03/22/2022 17  8 - 22 mg/dL Final   • Creatinine 03/22/2022 0.92  0.50 - 1.40 mg/dL Final   • Calcium 03/22/2022 9.7  8.5 - 10.5 mg/dL Final   • AST(SGOT) 03/22/2022 32  12 - 45 U/L Final   • ALT(SGPT) 03/22/2022 59 (A) 2 - 50 U/L Final   • Alkaline Phosphatase 03/22/2022 72  30 - 99 U/L Final   • Total Bilirubin 03/22/2022 0.9  0.1 - 1.5 mg/dL Final   • Albumin 03/22/2022 4.6  3.2 - 4.9 g/dL Final   • Total Protein 03/22/2022 7.1  6.0 - 8.2 g/dL Final   • Globulin 03/22/2022 2.5  1.9 - 3.5 g/dL Final   • A-G Ratio 03/22/2022 1.8  g/dL Final   • Glycohemoglobin 03/22/2022 9.0 (A) 4.0 - 5.6 % Final    Comment: Increased risk for diabetes:  5.7 -6.4%  Diabetes:  >6.4%  Glycemic control for adults with diabetes:  <7.0%    The above interpretations are per ADA guidelines.  Diagnosis  of diabetes mellitus on the basis of elevated Hemoglobin A1c  should be confirmed by repeating the Hb A1c test.     • Est Avg Glucose 03/22/2022 212  mg/dL Final    Comment: The eAG calculation is based on the A1c-Derived Daily Glucose  (ADAG) study.  See the ADA's website for additional information.     • Hepatitis C Antibody 03/22/2022 Reactive (A) Non-Reactive Final    Comment: This is a presumptive result of reactive.  This test has been  sent out for confirmation by HCV Quantative NA[facility].  The Roche anti-HCV is a diagnostic test for the qualitative determination of  antibodies to the hepatitis C virus in human serum and plasma. The results  should be used and interpreted only in the context of the overall clinical  picture. A negative test result does not exclude the possibility of exposure  to hepatitis C virus.  Note: Assay performance characteristics have not been established in  populations of immunocompromised or immunosuppressed patients.     • Creatinine, Urine 03/22/2022 158.32  mg/dL Final   • Microalbumin, Urine Random 03/22/2022 21.7  mg/dL Final   • Micro Alb Creat Ratio 03/22/2022 137 (A) 0 - 30 mg/g  "Final   • GFR (CKD-EPI) 03/22/2022 95  >60 mL/min/1.73 m 2 Final    Comment: Effective 3/15/2022, estimated Glomerular Filtration Rate  is calculated using race neutral CKD-EPI 2021 equation  per NKF-ASN recommendations.     • Hepatitis C Rna By Pcr, [place] 03/22/2022 55,344,584  IU/mL Final   • HCV RNA PCR Qnt Log 03/22/2022 7.74  log IU/mL Final   • HCV Qnt by NAAT Interp 03/22/2022 Detected (A) Not Detected Final    Comment: INTERPRETIVE INFORMATION: HCV by Quantitative NAAT  Normal range for this assay is \"Not Detected\".  The quantitative range of this assay is 10 - 100,000,000 IU/mL  (1.0 - 8.0 log IU/mL).  Lower limit of quantitation (LLoQ):  10 IU/mL (1.0 log IU/mL)  LLoQ values do not apply to diluted specimens.  A result of \"Not Detected\" does not rule out the presence of  inhibitors in the patient specimen or hepatitis C virus RNA  concentrations below the level of detection of the test. Care  should be taken when interpreting any single viral load  determination.  This test should not be used for blood donor screening, associated  re-entry protocols, or for screening Human Cell, Tissues and  Cellular Tissue-Based Products (HCT/P).  Performed by [facility],  [address] [place] [id number] [phone]  [email or web address], Mine Mitchell MD - Lab. Director         /70   Pulse 65   Temp 36.3 °C (97.3 °F)   Resp 20   Ht 1.689 m (5' 6.5\")   Wt 91.3 kg (201 lb 3.2 oz)   SpO2 96%  Body mass index is 31.99 kg/m².    Physical Exam:  Constitutional: Well-developed and well-nourished male in NAD. Not diaphoretic. No distress.   Skin: warm, dry, intact, no jaundice  Cardiovascular: Regular rate and rhythm without murmur. Radial pulses are intact and equal bilaterally.  Pulmonary: Clear to ausculation. Normal effort. No rales, ronchi, or wheezing.  Abdomen: Soft, non tender, and without distention. Active bowel sounds in all four quadrants.   Extremities: No cyanosis, clubbing, erythema, nor edema. "   Neurological: Alert and oriented x 3.   Psychiatric:  Behavior, mood, and affect are appropriate.      Assessment and Plan:     The following treatment plan was discussed:  I have reviewed all labs with patient and answered all questions.    1. Type 2 diabetes mellitus with hyperglycemia, without long-term current use of insulin (HCC)  - Empagliflozin 10 MG Tab; Take 1 Tablet by mouth every day.  Dispense: 90 Tablet; Refill: 3  - lisinopril (PRINIVIL) 2.5 MG Tab; Take 1 Tablet by mouth every day.  Dispense: 90 Tablet; Refill: 1  - HEMOGLOBIN A1C; Future    2. Hepatitis C antibody positive in blood  - HEPATIC FUNCTION PANEL; Future    3. Elevated liver enzymes  - HEPATIC FUNCTION PANEL; Future    4. Essential hypertension      Any change or worsening of signs or symptoms, patient encouraged to follow-up or report to emergency room for further evaluation. Patient verbalizes understanding and agrees.    Follow-Up: Return in about 3 months (around 7/12/2022) for DM, Follow up labs.      PLEASE NOTE: This dictation was created using voice recognition software. I have made every reasonable attempt to correct obvious errors, but I expect that there are errors of grammar and possibly content that I did not discover before finalizing the note.

## 2022-04-13 NOTE — ASSESSMENT & PLAN NOTE
This is a new condition.  Reviewed labs with patient indicating positive hepatitis C antibody.  Patient does report a prior history of IV drug use and serving present time.  He reports he thinks a check for hepatitis C in person and that he was negative.    He denies any nausea, vomiting, yellowing of the skin, or right upper quadrant pain.    Plan  In-depth conversation with patient regards positive hepatitis C antibody.  Encourage patient to get HCV RNA PCR genotype reflex completed.  Possible referral to GI.  Handouts were provided in discharge instructions.

## 2022-04-13 NOTE — PATIENT INSTRUCTIONS
Hepatitis C Testing  Why am I having this test?  Hepatitis C testing is done to check for a liver infection caused by the hepatitis C virus (HCV). You may have one or more hepatitis C tests done:  · To help your health care provider diagnose HCV infection, if you have possible signs or symptoms of infection.  · To check for infection, if you may have been exposed to HCV.  · To find the cause of long-term (chronic) liver disease or abnormal liver function test results.  · To see if you have had hepatitis C in the past.  Hepatitis C is usually diagnosed with three blood tests:  · Anti-HCV test, also called the HCV antibody test.  · HCV RNA test.  · HCV genotype test.  If you are diagnosed with a current (active) HCV infection, you may have another test done to help monitor your condition during treatment. This test is called the quantitative HCV RNA test.  What is being tested?  Each HCV test measures the amounts of different substances in your blood.  · The anti-HCV test checks for proteins that your body makes to fight HCV (antibodies). If you have antibodies to HCV, it means you have been infected with hepatitis C. It does not necessarily mean that you have an active infection.  · The HCV RNA test checks for genetic material from HCV. This test is done if your HCV antibody test is positive and your health care provider wants to find out if you have an active infection.  · The HCV genotype test. This test identifies the type (genotype) of virus you have.  · The quantitative HCV RNA test measures the amount of virus in your blood (viral load).  What kind of sample is taken?    A blood sample is required for HCV tests. It is usually collected by inserting a needle into a blood vessel.  How are the results reported?  · Anti-HCV test results are reported as either positive or negative for HCV antibodies.  · HCV RNA test results are reported as either positive or negative for HCV genetic material.  · HCV genotype test  results are reported as which genotype of the virus you have. Genotypes are numbered 1 through 6.  · Quantitative HCV RNA test results are reported as a number that indicates your viral load. This is given as international units of virus per milliliter of blood (IU/mL).  ? A result of 800,000 IU/mL or greater is considered a high viral load.  ? A result of less than 800,000 IU/mL is considered a low viral load.  Sometimes, results from the anti-HCV test or the HCV RNA test may report that:  · HCV antibodies or genetic material are present when they are not present (false-positive result).  · HCV antibodies or genetic material are not present when they are present (false-negative result).  What do the results mean?  For the anti-HCV test:  · A negative result may mean that you have not been infected with HCV. You may need to have this test done again to confirm this result.  · A positive result may mean that you have an active HCV infection, or that you have been infected with HCV in the past. An HCV infection may not cause any symptoms, and your body may get rid of the virus without treatment.  For the HCV RNA test:  · A negative result means that you do not have an active HCV infection.  · A positive result means that you have an active HCV infection.  For the HCV genotype test, knowing the specific genotype you have will help your health care provider recommend the treatment that will work best for you.  The quantitative HCV RNA test gives your health care provider an idea of how well your treatment is working.  · If your viral load is high, you may need different treatment.  · If your viral load is low, your treatment may be working effectively.  · You may have this test repeated to continue to monitor your treatment.  Talk with your health care provider about what your results mean.  Questions to ask your health care provider  Ask your health care provider, or the department that is doing the test:  · When will  my results be ready?  · How will I get my results?  · What are my treatment options?  · What other tests do I need?  · What are my next steps?  Summary  · The hepatitis C testing is done to check for a liver infection caused by the hepatitis C virus (HCV).  · Hepatitis C is usually diagnosed with three blood tests and monitored with one test.  · A blood sample is required for these tests. It is usually collected by inserting a needle into a blood vessel.  · Your test results for both the anti-HCV test and the HCV RNA test will be reported as either positive or negative.  This information is not intended to replace advice given to you by your health care provider. Make sure you discuss any questions you have with your health care provider.  Document Released: 01/20/2006 Document Revised: 11/30/2018 Document Reviewed: 11/05/2018  [employer] Patient Education © 2020 [employer]  Empagliflozin oral tablets  What is this medicine?  EMPAGLIFLOZIN (EM pa gli FLOE zin) helps to treat type 2 diabetes. It helps to control blood sugar. This drug may also reduce the risk of heart attack or stroke if you have type 2 diabetes and risk factors for heart disease. Treatment is combined with diet and exercise.  This medicine may be used for other purposes; ask your health care provider or pharmacist if you have questions.  COMMON BRAND NAME(S): JARDIANCE  What should I tell my health care provider before I take this medicine?  They need to know if you have any of these conditions:  · dehydration  · diabetic ketoacidosis  · diet low in salt  · eating less due to illness, surgery, dieting, or any other reason  · having surgery  · high cholesterol  · high levels of potassium in the blood  · history of pancreatitis or pancreas problems  · history of yeast infection of the penis or vagina  · if you often drink alcohol  · infections in the bladder, kidneys, or urinary tract  · kidney disease  · liver disease  · low blood pressure  · on  hemodialysis  · problems urinating  · type 1 diabetes  · uncircumcised male  · an unusual or allergic reaction to empagliflozin, other medicines, foods, dyes, or preservatives  · pregnant or trying to get pregnant  · breast-feeding  How should I use this medicine?  Take this medicine by mouth with a glass of water. Follow the directions on the prescription label. Take it in the morning, with or without food. Take your dose at the same time each day. Do not take more often than directed. Do not stop taking except on your doctor's advice.  Talk to your pediatrician regarding the use of this medicine in children. Special care may be needed.  Overdosage: If you think you have taken too much of this medicine contact a poison control center or emergency room at once.  NOTE: This medicine is only for you. Do not share this medicine with others.  What if I miss a dose?  If you miss a dose, take it as soon as you can. If it is almost time for your next dose, take only that dose. Do not take double or extra doses.  What may interact with this medicine?  Do not take this medicine with any of the following medications:  · gatifloxacin  This medicine may also interact with the following medications:  · alcohol  · certain medicines for blood pressure, heart disease  · diuretics  This list may not describe all possible interactions. Give your health care provider a list of all the medicines, herbs, non-prescription drugs, or dietary supplements you use. Also tell them if you smoke, drink alcohol, or use illegal drugs. Some items may interact with your medicine.  What should I watch for while using this medicine?  Visit your doctor or health care professional for regular checks on your progress.  This medicine can cause a serious condition in which there is too much acid in the blood. If you develop nausea, vomiting, stomach pain, unusual tiredness, or breathing problems, stop taking this medicine and call your doctor right away.  If possible, use a ketone dipstick to check for ketones in your urine.  A test called the HbA1C (A1C) will be monitored. This is a simple blood test. It measures your blood sugar control over the last 2 to 3 months. You will receive this test every 3 to 6 months.  Learn how to check your blood sugar. Learn the symptoms of low and high blood sugar and how to manage them.  Always carry a quick-source of sugar with you in case you have symptoms of low blood sugar. Examples include hard sugar candy or glucose tablets. Make sure others know that you can choke if you eat or drink when you develop serious symptoms of low blood sugar, such as seizures or unconsciousness. They must get medical help at once.  Tell your doctor or health care professional if you have high blood sugar. You might need to change the dose of your medicine. If you are sick or exercising more than usual, you might need to change the dose of your medicine.  Do not skip meals. Ask your doctor or health care professional if you should avoid alcohol. Many nonprescription cough and cold products contain sugar or alcohol. These can affect blood sugar.  Wear a medical ID bracelet or chain, and carry a card that describes your disease and details of your medicine and dosage times.  What side effects may I notice from receiving this medicine?  Side effects that you should report to your doctor or health care professional as soon as possible:  · allergic reactions like skin rash, itching or hives, swelling of the face, lips, or tongue  · breathing problems  · dizziness  · feeling faint or lightheaded, falls  · muscle weakness  · nausea, vomiting, unusual stomach upset or pain  · penile discharge, itching, or pain in men  · signs and symptoms of a genital infection, such as fever; tenderness, redness, or swelling in the genitals or area from the genitals to the back of the rectum  · signs and symptoms of low blood sugar such as feeling anxious, confusion,  dizziness, increased hunger, unusually weak or tired, sweating, shakiness, cold, irritable, headache, blurred vision, fast heartbeat, loss of consciousness  · signs and symptoms of a urinary tract infection, such as fever, chills, a burning feeling when urinating, blood in the urine, back pain  · trouble passing urine or change in the amount of urine, including an urgent need to urinate more often, in larger amounts, or at night  · unusual tiredness  · vaginal discharge, itching, or odor in women  Side effects that usually do not require medical attention (report to your doctor or health care professional if they continue or are bothersome):  · mild increase in urination  · thirsty  This list may not describe all possible side effects. Call your doctor for medical advice about side effects. You may report side effects to FDA at 1-182-FDA-4892.  Where should I keep my medicine?  Keep out of the reach of children.  Store at room temperature between 20 and 25 degrees C (68 and 77 degrees F). Throw away any unused medicine after the expiration date.  NOTE: This sheet is a summary. It may not cover all possible information. If you have questions about this medicine, talk to your doctor, pharmacist, or health care provider.  © 2020 [employer] (2018-08-30 10:25:34)  Lisinopril tablets  What is this medicine?  LISINOPRIL (lyse IN oh pril) is an ACE inhibitor. This medicine is used to treat high blood pressure and heart failure. It is also used to protect the heart immediately after a heart attack.  This medicine may be used for other purposes; ask your health care provider or pharmacist if you have questions.  COMMON BRAND NAME(S): Prinivil, Zestril  What should I tell my health care provider before I take this medicine?  They need to know if you have any of these conditions:  · diabetes  · heart or blood vessel disease  · kidney disease  · low blood pressure  · previous swelling of the tongue, face, or lips  with difficulty breathing, difficulty swallowing, hoarseness, or tightening of the throat  · an unusual or allergic reaction to lisinopril, other ACE inhibitors, insect venom, foods, dyes, or preservatives  · pregnant or trying to get pregnant  · breast-feeding  How should I use this medicine?  Take this medicine by mouth with a glass of water. Follow the directions on your prescription label. You may take this medicine with or without food. If it upsets your stomach, take it with food. Take your medicine at regular intervals. Do not take it more often than directed. Do not stop taking except on your doctor's advice.  Talk to your pediatrician regarding the use of this medicine in children. Special care may be needed. While this drug may be prescribed for children as young as 6 years of age for selected conditions, precautions do apply.  Overdosage: If you think you have taken too much of this medicine contact a poison control center or emergency room at once.  NOTE: This medicine is only for you. Do not share this medicine with others.  What if I miss a dose?  If you miss a dose, take it as soon as you can. If it is almost time for your next dose, take only that dose. Do not take double or extra doses.  What may interact with this medicine?  Do not take this medicine with any of the following medications:  · hymenoptera venom  · sacubitril; valsartan  This medicines may also interact with the following medications:  · aliskiren  · angiotensin receptor blockers, like losartan or valsartan  · certain medicines for diabetes  · diuretics  · everolimus  · gold compounds  · lithium  · NSAIDs, medicines for pain and inflammation, like ibuprofen or naproxen  · potassium salts or supplements  · salt substitutes  · sirolimus  · temsirolimus  This list may not describe all possible interactions. Give your health care provider a list of all the medicines, herbs, non-prescription drugs, or dietary supplements you use. Also tell  them if you smoke, drink alcohol, or use illegal drugs. Some items may interact with your medicine.  What should I watch for while using this medicine?  Visit your doctor or health care professional for regular check ups. Check your blood pressure as directed. Ask your doctor what your blood pressure should be, and when you should contact him or her.  Do not treat yourself for coughs, colds, or pain while you are using this medicine without asking your doctor or health care professional for advice. Some ingredients may increase your blood pressure.  Women should inform their doctor if they wish to become pregnant or think they might be pregnant. There is a potential for serious side effects to an unborn child. Talk to your health care professional or pharmacist for more information.  Check with your doctor or health care professional if you get an attack of severe diarrhea, nausea and vomiting, or if you sweat a lot. The loss of too much body fluid can make it dangerous for you to take this medicine.  You may get drowsy or dizzy. Do not drive, use machinery, or do anything that needs mental alertness until you know how this drug affects you. Do not stand or sit up quickly, especially if you are an older patient. This reduces the risk of dizzy or fainting spells. Alcohol can make you more drowsy and dizzy. Avoid alcoholic drinks.  Avoid salt substitutes unless you are told otherwise by your doctor or health care professional.  What side effects may I notice from receiving this medicine?  Side effects that you should report to your doctor or health care professional as soon as possible:  · allergic reactions like skin rash, itching or hives, swelling of the hands, feet, face, lips, throat, or tongue  · breathing problems  · signs and symptoms of kidney injury like trouble passing urine or change in the amount of urine  · signs and symptoms of increased potassium like muscle weakness; chest pain; or fast, irregular  heartbeat  · signs and symptoms of liver injury like dark yellow or brown urine; general ill feeling or flu-like symptoms; light-colored stools; loss of appetite; nausea; right upper belly pain; unusually weak or tired; yellowing of the eyes or skin  · signs and symptoms of low blood pressure like dizziness; feeling faint or lightheaded, falls; unusually weak or tired  · stomach pain with or without nausea and vomiting  Side effects that usually do not require medical attention (report to your doctor or health care professional if they continue or are bothersome):  · changes in taste  · cough  · dizziness  · fever  · headache  · sensitivity to light  This list may not describe all possible side effects. Call your doctor for medical advice about side effects. You may report side effects to FDA at 1-779-TYI-7589.  Where should I keep my medicine?  Keep out of the reach of children.  Store at room temperature between 15 and 30 degrees C (59 and 86 degrees F). Protect from moisture. Keep container tightly closed. Throw away any unused medicine after the expiration date.  NOTE: This sheet is a summary. It may not cover all possible information. If you have questions about this medicine, talk to your doctor, pharmacist, or health care provider.  © 2020 [employer] (2017-02-06 12:52:35)

## 2022-04-13 NOTE — ASSESSMENT & PLAN NOTE
Chronic and uncontrolled condition.  Patient's A1c level is 9%.   Reviewed labs with patient and answered all questions.  Patient is positive for microalbumin in urine.  Currently taking Amaryl 4 mg in the morning and 1000 mg Metformin daily.  Patient is intolerant to Metformin twice daily.  Patient does report eating ice cream nightly.  He does not follow a diabetic friendly diet.  He does not regularly exercise.  He is up-to-date on his monofilament and retinal screening.    Not currently on ACE or ARB.  Probably on statin therapy.    Plan  In-depth conversation with patient regards to appropriate diet lifestyle modifications.  Encourage patient to stop eating ice cream nightly and switch to sugar-free.  Discussed insulin therapy or once weekly injectables however patient declined due to needles.  New prescription of Jardiance was sent to pharmacy.  Side effects of medication were discussed.  Started on lisinopril for secondary prevention.  Patient will continue on Metformin 1000 mg daily and Amaryl.  No refills needed at this time.

## 2022-04-14 ENCOUNTER — HOSPITAL ENCOUNTER (OUTPATIENT)
Dept: LAB | Facility: MEDICAL CENTER | Age: 62
End: 2022-04-14
Attending: NURSE PRACTITIONER
Payer: COMMERCIAL

## 2022-04-14 DIAGNOSIS — R76.8 HEPATITIS C ANTIBODY POSITIVE IN BLOOD: ICD-10-CM

## 2022-04-14 DIAGNOSIS — R74.8 ELEVATED LIVER ENZYMES: ICD-10-CM

## 2022-04-14 DIAGNOSIS — E11.65 TYPE 2 DIABETES MELLITUS WITH HYPERGLYCEMIA, WITHOUT LONG-TERM CURRENT USE OF INSULIN (HCC): ICD-10-CM

## 2022-04-14 LAB
ALBUMIN SERPL BCP-MCNC: 4.8 G/DL (ref 3.2–4.9)
ALP SERPL-CCNC: 63 U/L (ref 30–99)
ALT SERPL-CCNC: 78 U/L (ref 2–50)
AST SERPL-CCNC: 38 U/L (ref 12–45)
BILIRUB CONJ SERPL-MCNC: <0.2 MG/DL (ref 0.1–0.5)
BILIRUB INDIRECT SERPL-MCNC: ABNORMAL MG/DL (ref 0–1)
BILIRUB SERPL-MCNC: 1 MG/DL (ref 0.1–1.5)
EST. AVERAGE GLUCOSE BLD GHB EST-MCNC: 214 MG/DL
HBA1C MFR BLD: 9.1 % (ref 4–5.6)
PROT SERPL-MCNC: 7.6 G/DL (ref 6–8.2)

## 2022-04-14 PROCEDURE — 83036 HEMOGLOBIN GLYCOSYLATED A1C: CPT

## 2022-04-14 PROCEDURE — 80076 HEPATIC FUNCTION PANEL: CPT

## 2022-04-14 PROCEDURE — 36415 COLL VENOUS BLD VENIPUNCTURE: CPT

## 2022-04-18 DIAGNOSIS — R74.8 ELEVATED LIVER ENZYMES: ICD-10-CM

## 2022-04-18 DIAGNOSIS — R76.8 HEPATITIS C ANTIBODY POSITIVE IN BLOOD: ICD-10-CM

## 2022-04-18 DIAGNOSIS — E11.65 TYPE 2 DIABETES MELLITUS WITH HYPERGLYCEMIA, WITHOUT LONG-TERM CURRENT USE OF INSULIN (HCC): ICD-10-CM

## 2022-05-02 ENCOUNTER — TELEPHONE (OUTPATIENT)
Dept: VASCULAR LAB | Facility: MEDICAL CENTER | Age: 62
End: 2022-05-02
Payer: COMMERCIAL

## 2022-05-02 NOTE — TELEPHONE ENCOUNTER
Called patient to inform him that we received a referral from his PCP for hepatitis C treatment. Gave patient Rashad's number ([phone]) to schedule an appointment with [facility].     Zaida Medina, PharmD,BCPS  PGY2 Infectious Diseases Pharmacy Resident

## 2022-06-16 ENCOUNTER — HOSPITAL ENCOUNTER (OUTPATIENT)
Dept: LAB | Facility: MEDICAL CENTER | Age: 62
End: 2022-06-16
Attending: INTERNAL MEDICINE
Payer: COMMERCIAL

## 2022-06-16 ENCOUNTER — OFFICE VISIT (OUTPATIENT)
Dept: INFECTIOUS DISEASES | Facility: MEDICAL CENTER | Age: 62
End: 2022-06-16
Payer: COMMERCIAL

## 2022-06-16 VITALS
TEMPERATURE: 98.1 F | WEIGHT: 191 LBS | BODY MASS INDEX: 30.7 KG/M2 | HEART RATE: 79 BPM | RESPIRATION RATE: 16 BRPM | DIASTOLIC BLOOD PRESSURE: 70 MMHG | OXYGEN SATURATION: 94 % | SYSTOLIC BLOOD PRESSURE: 120 MMHG | HEIGHT: 66 IN

## 2022-06-16 DIAGNOSIS — B18.2 CHRONIC HEPATITIS C WITHOUT HEPATIC COMA (HCC): ICD-10-CM

## 2022-06-16 DIAGNOSIS — E11.65 TYPE 2 DIABETES MELLITUS WITH HYPERGLYCEMIA, WITHOUT LONG-TERM CURRENT USE OF INSULIN (HCC): ICD-10-CM

## 2022-06-16 DIAGNOSIS — K52.832 LYMPHOCYTIC COLITIS: ICD-10-CM

## 2022-06-16 DIAGNOSIS — I10 ESSENTIAL HYPERTENSION: ICD-10-CM

## 2022-06-16 LAB
HBV CORE AB SERPL QL IA: NONREACTIVE
HBV SURFACE AB SERPL IA-ACNC: <3.5 MIU/ML (ref 0–10)
HBV SURFACE AG SER QL: NORMAL
HIV 1+2 AB+HIV1 P24 AG SERPL QL IA: NORMAL
INR PPP: 1.01 (ref 0.87–1.13)
PLATELET # BLD AUTO: 211 K/UL (ref 164–446)
PROTHROMBIN TIME: 13 SEC (ref 12–14.6)

## 2022-06-16 PROCEDURE — 83883 ASSAY NEPHELOMETRY NOT SPEC: CPT

## 2022-06-16 PROCEDURE — 85610 PROTHROMBIN TIME: CPT

## 2022-06-16 PROCEDURE — 99204 OFFICE O/P NEW MOD 45 MIN: CPT | Performed by: INTERNAL MEDICINE

## 2022-06-16 PROCEDURE — 87902 NFCT AGT GNTYP ALYS HEP C: CPT

## 2022-06-16 PROCEDURE — 36415 COLL VENOUS BLD VENIPUNCTURE: CPT

## 2022-06-16 PROCEDURE — 87340 HEPATITIS B SURFACE AG IA: CPT

## 2022-06-16 PROCEDURE — 84460 ALANINE AMINO (ALT) (SGPT): CPT

## 2022-06-16 PROCEDURE — 82977 ASSAY OF GGT: CPT

## 2022-06-16 PROCEDURE — 86708 HEPATITIS A ANTIBODY: CPT

## 2022-06-16 PROCEDURE — 86706 HEP B SURFACE ANTIBODY: CPT

## 2022-06-16 PROCEDURE — 84450 TRANSFERASE (AST) (SGOT): CPT

## 2022-06-16 PROCEDURE — 87389 HIV-1 AG W/HIV-1&-2 AB AG IA: CPT

## 2022-06-16 PROCEDURE — 86704 HEP B CORE ANTIBODY TOTAL: CPT

## 2022-06-16 PROCEDURE — 85049 AUTOMATED PLATELET COUNT: CPT

## 2022-06-16 PROCEDURE — 84520 ASSAY OF UREA NITROGEN: CPT

## 2022-06-16 ASSESSMENT — FIBROSIS 4 INDEX: FIB4 SCORE: 1.29

## 2022-06-17 NOTE — PROGRESS NOTES
[facility] INFECTIOUS DISEASES CLINIC NOTE     Date of Service: 6/16/2022    Referring: BLACK Lyn    Reason for Referral: Hepatitis C    Chief Complaint: Hepatitis C    History of Present Illness:     Hank Vazquez is a 61 y.o. male patient with uncontrolled type 2 diabetes, essential hypertension, lymphocytic colitis, diagnosed with chronic hepatitis C about 2 months ago during routine screening.  Patient states that he may have likely been exposed to this approximately 15 to 20 years ago with IV drug abuse.  Patient with occasional right sided abdominal pain, no fevers or chills.  He notes no diarrhea, unaware of any significant malabsorption issues.  He is considering starting insulin for better control of diabetes.    HCV genotype:  HCV resistance:  Prior treatment status: Naïve  Risk factors: Remote history of IV drug abuse  FibroSure:  CTP score: Likely class A  FIB-4 score: 0.51  HCV PCR at start of treatment: 55,344,584  HBV serologies:  HAV serology:  HIV Ab:      Review of Systems:  All other systems reviewed and are negative expect as noted in HPI    Past Medical History:   Diagnosis Date   • Arthritis    • Diabetes (HCC)    • Essential hypertension 4/16/2015       Past Surgical History:   Procedure Laterality Date   • OTHER      tubes in ears as child   • OTHER ORTHOPEDIC SURGERY      Left elbow ORIF   • TONSILLECTOMY         Family History   Problem Relation Age of Onset   • Heart Attack Mother    • Alzheimer's Disease Mother    • Arthritis Father    • Gout Father    • Alzheimer's Disease Maternal Grandfather        Social History     Socioeconomic History   • Marital status:      Spouse name: Not on file   • Number of children: Not on file   • Years of education: Not on file   • Highest education level: Not on file   Occupational History   • Not on file   Tobacco Use   • Smoking status: Former Smoker   • Smokeless tobacco: Never Used   Vaping Use   • Vaping Use: Never used  "  Substance and Sexual Activity   • Alcohol use: Not Currently   • Drug use: Not Currently   • Sexual activity: Yes     Partners: Female   Other Topics Concern   • Not on file   Social History Narrative   • Not on file     Social Determinants of Health     Financial Resource Strain: Not on file   Food Insecurity: Not on file   Transportation Needs: Not on file   Physical Activity: Not on file   Stress: Not on file   Social Connections: Not on file   Intimate Partner Violence: Not on file   Housing Stability: Not on file       No Known Allergies    Medications:  Current Outpatient Medications on File Prior to Visit   Medication Sig Dispense Refill   • Empagliflozin 10 MG Tab Take 1 Tablet by mouth every day. 90 Tablet 3   • lisinopril (PRINIVIL) 2.5 MG Tab Take 1 Tablet by mouth every day. 90 Tablet 1   • glimepiride (AMARYL) 4 MG Tab Take 1 Tablet by mouth every morning. TAKE 1 TABLET BY MOUTH DAILY 90 Tablet 2   • atorvastatin (LIPITOR) 20 MG Tab Take 1 Tablet by mouth every evening. 90 Tablet 3   • glucose blood (COOL BLOOD GLUCOSE TEST STRIPS) strip 1 Strip by Other route 2 times a day as needed (True metrix B/G test strips 50S). 100 Strip 11   • Fenugreek 610 MG Cap Take 610 mg by mouth every day.     • metFORMIN ER (GLUCOPHAGE XR) 500 MG TABLET SR 24 HR Take 2 Tablets by mouth every day. 180 tablet 3   • budesonide (ENTOCORT EC) 3 MG Cap DR Particles capsule  (Patient not taking: No sig reported)       No current facility-administered medications on file prior to visit.       Physical Exam:   Vital Signs: /70 (BP Location: Left arm, Patient Position: Sitting, BP Cuff Size: Adult)   Pulse 79   Temp 36.7 °C (98.1 °F)   Resp 16   Ht 1.676 m (5' 6\")   Wt 86.6 kg (191 lb)   SpO2 94%   BMI 30.83 kg/m²   Vital signs reviewed  Constitutional: Patient is oriented to person, place, and time. Appears well-developed and well-nourished. No distress  Head: Atraumatic, normocephalic  Eyes: Conjunctivae normal, " EOM intact.   Mouth/Throat: Wearing a mask  Neck: Neck supple. No masses/lymphadenopathy  Cardiovascular: Normal rate, regular rhythm, normal S1S2 and intact distal pulses. No murmur, gallop, or friction rub. No pedal edema.  Pulmonary/Chest: No respiratory distress. Unlabored respiratory effort, lungs clear to auscultation. No wheezes or rales.   Abdominal: Soft, non tender. BS + x 4. No masses or hepatosplenomegaly.   Musculoskeletal: No joint tenderness, swelling, erythema, or restriction of motion noted.  Neurological: Alert and oriented to person, place, and time. No gross cranial nerve deficit. No focal neural deficit noted  Skin: Skin is warm and dry. No rashes or embolic phenomena noted on exposed skin  Psychiatric: Normal mood and affect. Behavior is normal.     LABS:  WBC   Date/Time Value Ref Range Status   06/29/2021 09:09 AM 6.9 4.8 - 10.8 K/uL Final     RBC   Date/Time Value Ref Range Status   06/29/2021 09:09 AM 5.24 4.70 - 6.10 M/uL Final     Hemoglobin   Date/Time Value Ref Range Status   06/29/2021 09:09 AM 16.1 14.0 - 18.0 g/dL Final     Hematocrit   Date/Time Value Ref Range Status   06/29/2021 09:09 AM 46.5 42.0 - 52.0 % Final     MCV   Date/Time Value Ref Range Status   06/29/2021 09:09 AM 88.7 81.4 - 97.8 fL Final     MCH   Date/Time Value Ref Range Status   06/29/2021 09:09 AM 30.7 27.0 - 33.0 pg Final     MCHC   Date/Time Value Ref Range Status   06/29/2021 09:09 AM 34.6 33.7 - 35.3 g/dL Final     MPV   Date/Time Value Ref Range Status   06/29/2021 09:09 AM 10.6 9.0 - 12.9 fL Final     Sodium   Date/Time Value Ref Range Status   03/22/2022 07:01  135 - 145 mmol/L Final     Potassium   Date/Time Value Ref Range Status   03/22/2022 07:01 AM 5.5 3.6 - 5.5 mmol/L Final     Chloride   Date/Time Value Ref Range Status   03/22/2022 07:01  96 - 112 mmol/L Final     Co2   Date/Time Value Ref Range Status   03/22/2022 07:01 AM 29 20 - 33 mmol/L Final     Glucose   Date/Time Value Ref Range  Status   03/22/2022 07:01  (H) 65 - 99 mg/dL Final     Bun   Date/Time Value Ref Range Status   03/22/2022 07:01 AM 17 8 - 22 mg/dL Final     Creatinine   Date/Time Value Ref Range Status   03/22/2022 07:01 AM 0.92 0.50 - 1.40 mg/dL Final     Alkaline Phosphatase   Date/Time Value Ref Range Status   04/14/2022 12:10 PM 63 30 - 99 U/L Final     AST(SGOT)   Date/Time Value Ref Range Status   04/14/2022 12:10 PM 38 12 - 45 U/L Final     ALT(SGPT)   Date/Time Value Ref Range Status   04/14/2022 12:10 PM 78 (H) 2 - 50 U/L Final     Total Bilirubin   Date/Time Value Ref Range Status   04/14/2022 12:10 PM 1.0 0.1 - 1.5 mg/dL Final      No results found for: CPKTOTAL     MICRO:  No results found for: BLOODCULTU, BLDCULT, BCHOLD      Latest pertinent labs were reviewed    IMAGING STUDIES:  No imaging to review    Assessment:   Hank Vazquez is a 61 y.o. male with uncontrolled type 2 diabetes, essential hypertension, lymphocytic colitis, here for treatment of chronic hepatitis C    Plan:   -Will check hepatitis C genotype, FibroSure, hepatitis A and B serologies, HIV antibody, PT/INR  -We will place pharmacotherapy referral for initiation of hepatitis C treatment.  We will need close evaluation of drug interactions especially given background of lymphocytic colitis and possibility of suboptimal drug absorption  -Anticipate repeat hepatitis C quantitative PCR testing 12 weeks after completion of therapy    Return to ID clinic after above lab is obtained 12 weeks after completion of therapy    Rohit Roque M.D.    Please note that this dictation was created using voice recognition software. I have worked with technical experts from [facility] to optimize the interface.  I have made every reasonable attempt to correct obvious errors, but there may be errors of grammar and possibly content that I did not discover before finalizing the note.

## 2022-06-18 LAB — HAV AB SER QL IA: NEGATIVE

## 2022-06-22 LAB
A2 MACROGLOB SERPL-MCNC: 408 MG/DL (ref 131–293)
ALT SERPL-CCNC: 65 U/L (ref 5–50)
ANNOTATION COMMENT IMP: ABNORMAL
AST SERPL-CCNC: 33 U/L (ref 9–50)
BUN SERPL-SCNC: 22 MG/DL (ref 7–20)
CIRRHOMETER PT SCORE Q4850: 0.05
FIBROSIS STAGE SERPL QL: ABNORMAL
GGT SERPL-CCNC: 23 U/L (ref 7–51)
HCV GENTYP SERPL NAA+PROBE: NORMAL
INFLAMETER META CLASS Q4853: ABNORMAL
INFLAMETER PT SCORE Q4852: 0.67
LIVER FIBR SCORE SERPL CALC.FIBROMETER: 0.81
PATHOLOGY STUDY: ABNORMAL
PROTHROM ACT/NOR PPP: 88 % (ref 90–120)

## 2022-07-07 ENCOUNTER — HOSPITAL ENCOUNTER (OUTPATIENT)
Dept: LAB | Facility: MEDICAL CENTER | Age: 62
End: 2022-07-07
Attending: NURSE PRACTITIONER
Payer: COMMERCIAL

## 2022-07-07 DIAGNOSIS — R74.8 ELEVATED LIVER ENZYMES: ICD-10-CM

## 2022-07-07 DIAGNOSIS — B18.2 CHRONIC HEPATITIS C WITHOUT HEPATIC COMA (HCC): ICD-10-CM

## 2022-07-07 DIAGNOSIS — E11.65 TYPE 2 DIABETES MELLITUS WITH HYPERGLYCEMIA, WITHOUT LONG-TERM CURRENT USE OF INSULIN (HCC): ICD-10-CM

## 2022-07-07 LAB
ALBUMIN SERPL BCP-MCNC: 4.2 G/DL (ref 3.2–4.9)
ALP SERPL-CCNC: 54 U/L (ref 30–99)
ALT SERPL-CCNC: 45 U/L (ref 2–50)
AST SERPL-CCNC: 27 U/L (ref 12–45)
BILIRUB CONJ SERPL-MCNC: <0.2 MG/DL (ref 0.1–0.5)
BILIRUB INDIRECT SERPL-MCNC: NORMAL MG/DL (ref 0–1)
BILIRUB SERPL-MCNC: 0.8 MG/DL (ref 0.1–1.5)
EST. AVERAGE GLUCOSE BLD GHB EST-MCNC: 180 MG/DL
HBA1C MFR BLD: 7.9 % (ref 4–5.6)
PROT SERPL-MCNC: 7 G/DL (ref 6–8.2)

## 2022-07-07 PROCEDURE — 83036 HEMOGLOBIN GLYCOSYLATED A1C: CPT

## 2022-07-07 PROCEDURE — 80076 HEPATIC FUNCTION PANEL: CPT

## 2022-07-07 PROCEDURE — 36415 COLL VENOUS BLD VENIPUNCTURE: CPT

## 2022-07-12 LAB — RETINAL SCREEN: NEGATIVE

## 2022-07-14 ENCOUNTER — OFFICE VISIT (OUTPATIENT)
Dept: MEDICAL GROUP | Facility: PHYSICIAN GROUP | Age: 62
End: 2022-07-14
Payer: COMMERCIAL

## 2022-07-14 VITALS
SYSTOLIC BLOOD PRESSURE: 110 MMHG | BODY MASS INDEX: 30.63 KG/M2 | HEIGHT: 66 IN | DIASTOLIC BLOOD PRESSURE: 72 MMHG | WEIGHT: 190.6 LBS | OXYGEN SATURATION: 97 % | RESPIRATION RATE: 18 BRPM | HEART RATE: 72 BPM | TEMPERATURE: 97.6 F

## 2022-07-14 DIAGNOSIS — Z12.5 PROSTATE CANCER SCREENING: ICD-10-CM

## 2022-07-14 DIAGNOSIS — Z23 NEED FOR VACCINATION: ICD-10-CM

## 2022-07-14 DIAGNOSIS — R74.8 ELEVATED LIVER ENZYMES: ICD-10-CM

## 2022-07-14 DIAGNOSIS — E11.65 TYPE 2 DIABETES MELLITUS WITH HYPERGLYCEMIA, WITHOUT LONG-TERM CURRENT USE OF INSULIN (HCC): ICD-10-CM

## 2022-07-14 DIAGNOSIS — B18.2 CHRONIC HEPATITIS C WITHOUT HEPATIC COMA (HCC): ICD-10-CM

## 2022-07-14 DIAGNOSIS — I10 ESSENTIAL HYPERTENSION: ICD-10-CM

## 2022-07-14 PROCEDURE — 90677 PCV20 VACCINE IM: CPT | Performed by: NURSE PRACTITIONER

## 2022-07-14 PROCEDURE — 90715 TDAP VACCINE 7 YRS/> IM: CPT | Performed by: NURSE PRACTITIONER

## 2022-07-14 PROCEDURE — 90471 IMMUNIZATION ADMIN: CPT | Performed by: NURSE PRACTITIONER

## 2022-07-14 PROCEDURE — 90472 IMMUNIZATION ADMIN EACH ADD: CPT | Performed by: NURSE PRACTITIONER

## 2022-07-14 PROCEDURE — 99214 OFFICE O/P EST MOD 30 MIN: CPT | Mod: 25 | Performed by: NURSE PRACTITIONER

## 2022-07-14 RX ORDER — BLOOD SUGAR DIAGNOSTIC
1 STRIP MISCELLANEOUS DAILY
Qty: 100 STRIP | Refills: 3 | Status: SHIPPED | OUTPATIENT
Start: 2022-07-14

## 2022-07-14 RX ORDER — LANCETS 30 GAUGE
EACH MISCELLANEOUS
Qty: 200 EACH | Refills: 2 | Status: SHIPPED | OUTPATIENT
Start: 2022-07-14

## 2022-07-14 ASSESSMENT — FIBROSIS 4 INDEX: FIB4 SCORE: 1.14

## 2022-07-14 NOTE — ASSESSMENT & PLAN NOTE
Stable condition.  Patient is followed closely with gastroenterology.  Patient is up-to-date with labs with improving liver values.    Patient is still awaiting scheduling ultrasound of liver.

## 2022-07-14 NOTE — ASSESSMENT & PLAN NOTE
Chronic and uncontrolled condition.  Current A1c level 7.9.  Patient is currently taking glimepiride 4 mg every morning,, Jardiance 10 mg twice daily, metformin 500 mg in AM and 1000 mg in p.m.  Patient denies any hypoglycemic events.  He does report eating more Jolly rancher's in the past 3 months.  Has discontinued eating ice cream.  Trying to stick to a low-carb diet.  Not regularly exercising outside of work.  Very active at work.

## 2022-07-14 NOTE — ASSESSMENT & PLAN NOTE
Well-controlled condition.  Blood pressure 110/72 today.  Patient currently on lisinopril 2.5 mg daily.  She denies any side effects medication.  Patient is up-to-date with labs.

## 2022-07-14 NOTE — ASSESSMENT & PLAN NOTE
Reviewed labs.  Improving liver values.  Patient is followed closely with gastroenterology due to hepatitis C.  He denies any yellowing of the skin, nausea, vomiting, or right upper quadrant pain.

## 2022-07-14 NOTE — PATIENT INSTRUCTIONS
Start taking 1-1/2 tablets of your Jardiance daily.  Take 2 tablets of metformin both a.m. and p.m.  Glimepiride 4 mg in a.m. with food.    Hyperglycemia  Hyperglycemia occurs when the level of sugar (glucose) in the blood is too high. Glucose is a type of sugar that provides the body's main source of energy. Certain hormones (insulin and glucagon) control the level of glucose in the blood. Insulin lowers blood glucose, and glucagon increases blood glucose. Hyperglycemia can result from having too little insulin in the bloodstream, or from the body not responding normally to insulin.  Hyperglycemia occurs most often in people who have diabetes (diabetes mellitus), but it can happen in people who do not have diabetes. It can develop quickly, and it can be life-threatening if it causes you to become severely dehydrated (diabetic ketoacidosis or hyperglycemic hyperosmolar state). Severe hyperglycemia is a medical emergency.  What are the causes?  If you have diabetes, hyperglycemia may be caused by:  Diabetes medicine.  Medicines that increase blood glucose or affect your diabetes control.  Not eating enough, or not eating often enough.  Changes in physical activity level.  Being sick or having an infection.  If you have prediabetes or undiagnosed diabetes:  Hyperglycemia may be caused by those conditions.  If you do not have diabetes, hyperglycemia may be caused by:  Certain medicines, including steroid medicines, beta-blockers, epinephrine, and thiazide diuretics.  Stress.  Serious illness.  Surgery.  Diseases of the pancreas.  Infection.  What increases the risk?  Hyperglycemia is more likely to develop in people who have risk factors for diabetes, such as:  Having a family member with diabetes.  Having a gene for type 1 diabetes that is passed from parent to child (inherited).  Living in an area with cold weather conditions.  Exposure to certain viruses.  Certain conditions in which the body's disease-fighting  (immune) system attacks itself (autoimmune disorders).  Being overweight or obese.  Having an inactive (sedentary) lifestyle.  Having been diagnosed with insulin resistance.  Having a history of prediabetes, gestational diabetes, or polycystic ovarian syndrome (PCOS).  Being of American-[place], -American, /, or / descent.  What are the signs or symptoms?  Hyperglycemia may not cause any symptoms. If you do have symptoms, they may include early warning signs, such as:  Increased thirst.  Hunger.  Feeling very tired.  Needing to urinate more often than usual.  Blurry vision.  Other symptoms may develop if hyperglycemia gets worse, such as:  Dry mouth.  Loss of appetite.  Fruity-smelling breath.  Weakness.  Unexpected or rapid weight gain or weight loss.  Tingling or numbness in the hands or feet.  Headache.  Skin that does not quickly return to normal after being lightly pinched and released (poor skin turgor).  Abdominal pain.  Cuts or bruises that are slow to heal.  How is this diagnosed?  Hyperglycemia is diagnosed with a blood test to measure your blood glucose level. This blood test is usually done while you are having symptoms. Your health care provider may also do a physical exam and review your medical history.  You may have more tests to determine the cause of your hyperglycemia, such as:  A fasting blood glucose (FBG) test. You will not be allowed to eat (you will fast) for at least 8 hours before a blood sample is taken.  An A1c (hemoglobin A1c) blood test. This provides information about blood glucose control over the previous 2-3 months.  An oral glucose tolerance test (OGTT). This measures your blood glucose at two times:  After fasting. This is your baseline blood glucose level.  Two hours after drinking a beverage that contains glucose.  How is this treated?  Treatment depends on the cause of your hyperglycemia. Treatment may include:  Taking medicine to  regulate your blood glucose levels. If you take insulin or other diabetes medicines, your medicine or dosage may be adjusted.  Lifestyle changes, such as exercising more, eating healthier foods, or losing weight.  Treating an illness or infection, if this caused your hyperglycemia.  Checking your blood glucose more often.  Stopping or reducing steroid medicines, if these caused your hyperglycemia.  If your hyperglycemia becomes severe and it results in hyperglycemic hyperosmolar state, you must be hospitalized and given IV fluids.  Follow these instructions at home:    General instructions  Take over-the-counter and prescription medicines only as told by your health care provider.  Do not use any products that contain nicotine or tobacco, such as cigarettes and e-cigarettes. If you need help quitting, ask your health care provider.  Limit alcohol intake to no more than 1 drink per day for nonpregnant women and 2 drinks per day for men. One drink equals 12 oz of beer, 5 oz of wine, or 1½ oz of hard liquor.  Learn to manage stress. If you need help with this, ask your health care provider.  Keep all follow-up visits as told by your health care provider. This is important.  Eating and drinking    Maintain a healthy weight.  Exercise regularly, as directed by your health care provider.  Stay hydrated, especially when you exercise, get sick, or spend time in hot temperatures.  Eat healthy foods, such as:  Lean proteins.  Complex carbohydrates.  Fresh fruits and vegetables.  Low-fat dairy products.  Healthy fats.  Drink enough fluid to keep your urine clear or pale yellow.  If you have diabetes:  Make sure you know the symptoms of hyperglycemia.  Follow your diabetes management plan, as told by your health care provider. Make sure you:  Take your insulin and medicines as directed.  Follow your exercise plan.  Follow your meal plan. Eat on time, and do not skip meals.  Check your blood glucose as often as directed. Make  sure to check your blood glucose before and after exercise. If you exercise longer or in a different way than usual, check your blood glucose more often.  Follow your sick day plan whenever you cannot eat or drink normally. Make this plan in advance with your health care provider.  Share your diabetes management plan with people in your workplace, school, and household.  Check your urine for ketones when you are ill and as told by your health care provider.  Carry a medical alert card or wear medical alert jewelry.  Contact a health care provider if:  Your blood glucose is at or above 240 mg/dL (13.3 mmol/L) for 2 days in a row.  You have problems keeping your blood glucose in your target range.  You have frequent episodes of hyperglycemia.  Get help right away if:  You have difficulty breathing.  You have a change in how you think, feel, or act (mental status).  You have nausea or vomiting that does not go away.  These symptoms may represent a serious problem that is an emergency. Do not wait to see if the symptoms will go away. Get medical help right away. Call your local emergency services (911 in the [place] Do not drive yourself to the hospital.  Summary  Hyperglycemia occurs when the level of sugar (glucose) in the blood is too high.  Hyperglycemia is diagnosed with a blood test to measure your blood glucose level. This blood test is usually done while you are having symptoms. Your health care provider may also do a physical exam and review your medical history.  If you have diabetes, follow your diabetes management plan as told by your health care provider.  Contact your health care provider if you have problems keeping your blood glucose in your target range.  This information is not intended to replace advice given to you by your health care provider. Make sure you discuss any questions you have with your health care provider.  Document Released: 06/13/2002 Document Revised: 09/04/2017 Document Reviewed:  09/04/2017  Elsevier Patient Education © 2020 [employer]

## 2022-07-14 NOTE — PROGRESS NOTES
Chief Complaint   Patient presents with   • Follow-Up     Labs/ refill test strips/ new lancet device       Subjective:     HPI:   Hank Vazquez is a 61 y.o. male here to discuss the evaluation and management of:      Type 2 diabetes mellitus (HCC)  Chronic and uncontrolled condition.  Current A1c level 7.9.  Patient is currently taking glimepiride 4 mg every morning,, Jardiance 10 mg twice daily, metformin 500 mg in AM and 1000 mg in p.m.  Patient denies any hypoglycemic events.  He does report eating more Jolly rancher's in the past 3 months.  Has discontinued eating ice cream.  Trying to stick to a low-carb diet.  Not regularly exercising outside of work.  Very active at work.          Essential hypertension  Well-controlled condition.  Blood pressure 110/72 today.  Patient currently on lisinopril 2.5 mg daily.  She denies any side effects medication.  Patient is up-to-date with labs.    Elevated liver enzymes  Reviewed labs.  Improving liver values.  Patient is followed closely with gastroenterology due to hepatitis C.  He denies any yellowing of the skin, nausea, vomiting, or right upper quadrant pain.        Chronic hepatitis C without hepatic coma (HCC)  Stable condition.  Patient is followed closely with gastroenterology.  Patient is up-to-date with labs with improving liver values.    Patient is still awaiting scheduling ultrasound of liver.        ROS:  Gen: no fevers/chills, no changes in weight  Pulm: no sob, no cough  CV: no chest pain, no palpitations  GI: no nausea/vomiting, no diarrhea  MSk: no myalgias  Neuro: no headaches, no numbness/tingling      No Known Allergies    Current medicines (including changes today)  Current Outpatient Medications   Medication Sig Dispense Refill   • Empagliflozin 25 MG Tab Take 25 mg by mouth every day. 90 Tablet 0   • glucose blood (COOL BLOOD GLUCOSE TEST STRIPS) strip 1 Strip by Other route every day. 100 Strip 3   • Lancets Lancets order: Lancets for  "insurance approved meter. Sig: use once daily for high or low sugar. #100 RF x 3 200 Each 2   • Blood Glucose Monitoring Suppl Device Meter: Dispense Device of Insurance Preference. Sig. Use as directed for blood sugar monitoring. #1. NR. 1 Each 0   • lisinopril (PRINIVIL) 2.5 MG Tab Take 1 Tablet by mouth every day. 90 Tablet 1   • glimepiride (AMARYL) 4 MG Tab Take 1 Tablet by mouth every morning. TAKE 1 TABLET BY MOUTH DAILY 90 Tablet 2   • atorvastatin (LIPITOR) 20 MG Tab Take 1 Tablet by mouth every evening. 90 Tablet 3   • Fenugreek 610 MG Cap Take 610 mg by mouth every day.     • metFORMIN ER (GLUCOPHAGE XR) 500 MG TABLET SR 24 HR Take 2 Tablets by mouth every day. 180 tablet 3   • budesonide (ENTOCORT EC) 3 MG Cap DR Particles capsule  (Patient not taking: No sig reported)       No current facility-administered medications for this visit.          Objective:       /72 (BP Location: Left arm)   Pulse 72   Temp 36.4 °C (97.6 °F) (Temporal)   Resp 18   Ht 1.676 m (5' 6\")   Wt 86.5 kg (190 lb 9.6 oz)   SpO2 97%  Body mass index is 30.76 kg/m².    Physical Exam:  Constitutional: Well-developed and well-nourished male in NAD. Not diaphoretic. No distress.   Skin: warm, dry, intact  Cardiovascular: Regular rate and rhythm without murmur. Radial pulses are intact and equal bilaterally.  Pulmonary: Clear to ausculation. Normal effort. No rales, ronchi, or wheezing.  Extremities: No cyanosis, clubbing, erythema, nor edema.   Neurological: Alert and oriented x 3.   Psychiatric:  Behavior, mood, and affect are appropriate.      Assessment and Plan:     The following treatment plan was discussed:  I have reviewed labs with patient and answered all questions.    1. Type 2 diabetes mellitus with hyperglycemia, without long-term current use of insulin (HCC)  Diabetes currently uncontrolled.    • Start taking 1-1/2 tablets of your Jardiance daily.  • Take 2 tablets of metformin both a.m. and p.m.  • Glimepiride 4 " mg in a.m. with food.  Labwork as indicated.  Diabetic foot exam and eye exams up to date.  Discussed the importance of healthy diet lifestyle modifications to help improve and control diabetes.   Discussed risks associated with micro and macro vascular damage that can occur with diabetes.  - Empagliflozin 25 MG Tab; Take 25 mg by mouth every day.  Dispense: 90 Tablet; Refill: 0  - glucose blood (COOL BLOOD GLUCOSE TEST STRIPS) strip; 1 Strip by Other route every day.  Dispense: 100 Strip; Refill: 3  - Hemoglobin A1c; Future  - Lipid Profile; Future  - Lancets; Lancets order: Lancets for insurance approved meter. Sig: use once daily for high or low sugar. #100 RF x 3  Dispense: 200 Each; Refill: 2  - Blood Glucose Monitoring Suppl Device; Meter: Dispense Device of Insurance Preference. Sig. Use as directed for blood sugar monitoring. #1. NR.  Dispense: 1 Each; Refill: 0    2. Essential hypertension  Well-controlled on current regimen.  Labs as indicated.  Continue antihypertensive medications.  Discussed decreasing salt intake.  Emphasized benefits of exercise and diet. Continue to monitor.  - CBC WITH DIFFERENTIAL; Future    3. Elevated liver enzymes  Chronic and improving.    4. Chronic hepatitis C without hepatic coma (HCC)  Chronic condition.  Continue following with gastroenterology.  Schedule ultrasound as soon as possible.    5. Prostate cancer screening  - PROSTATE SPECIFIC AG SCREENING; Future    6. Need for vaccination  - Pneumococcal Conjugate Vaccine 20-Valent (19 yrs+)  - Tdap Vaccine =>8YO IM      Any change or worsening of signs or symptoms, patient encouraged to follow-up or report to urgent care or emergency room for further evaluation. Patient verbalizes understanding and agrees.    Follow-Up: Return in about 3 months (around 10/14/2022) for DM, Follow up labs.      PLEASE NOTE: This dictation was created using voice recognition software. I have made every reasonable attempt to correct obvious  errors, but I expect that there are errors of grammar and possibly content that I did not discover before finalizing the note.

## 2022-08-04 ENCOUNTER — HOSPITAL ENCOUNTER (OUTPATIENT)
Dept: RADIOLOGY | Facility: MEDICAL CENTER | Age: 62
End: 2022-08-04
Attending: INTERNAL MEDICINE
Payer: COMMERCIAL

## 2022-08-04 DIAGNOSIS — B18.2 CHRONIC HEPATITIS C WITHOUT HEPATIC COMA (HCC): ICD-10-CM

## 2022-08-04 PROCEDURE — 76705 ECHO EXAM OF ABDOMEN: CPT

## 2022-08-08 ENCOUNTER — NON-PROVIDER VISIT (OUTPATIENT)
Dept: URGENT CARE | Facility: PHYSICIAN GROUP | Age: 62
End: 2022-08-08

## 2022-08-08 ENCOUNTER — OCCUPATIONAL MEDICINE (OUTPATIENT)
Dept: URGENT CARE | Facility: PHYSICIAN GROUP | Age: 62
End: 2022-08-08
Payer: COMMERCIAL

## 2022-08-08 VITALS
RESPIRATION RATE: 14 BRPM | DIASTOLIC BLOOD PRESSURE: 74 MMHG | HEIGHT: 66 IN | TEMPERATURE: 97.9 F | SYSTOLIC BLOOD PRESSURE: 132 MMHG | WEIGHT: 190 LBS | HEART RATE: 78 BPM | OXYGEN SATURATION: 96 % | BODY MASS INDEX: 30.53 KG/M2

## 2022-08-08 DIAGNOSIS — Z02.1 PRE-EMPLOYMENT DRUG SCREENING: ICD-10-CM

## 2022-08-08 DIAGNOSIS — S39.012A LUMBAR STRAIN, INITIAL ENCOUNTER: ICD-10-CM

## 2022-08-08 DIAGNOSIS — Z02.83 EMPLOYMENT-RELATED DRUG TESTING, ENCOUNTER FOR: ICD-10-CM

## 2022-08-08 LAB
AMP AMPHETAMINE: NORMAL
BAR BARBITURATES: NORMAL
BZO BENZODIAZEPINES: NORMAL
COC COCAINE: NORMAL
INT CON NEG: NORMAL
INT CON POS: NORMAL
MDMA ECSTASY: NORMAL
MET METHAMPHETAMINES: NORMAL
MTD METHADONE: NORMAL
OPI OPIATES: NORMAL
OXY OXYCODONE: NORMAL
PCP PHENCYCLIDINE: NORMAL
POC URINE DRUG SCREEN OCDRS: NORMAL
THC: NORMAL

## 2022-08-08 PROCEDURE — 80305 DRUG TEST PRSMV DIR OPT OBS: CPT | Performed by: NURSE PRACTITIONER

## 2022-08-08 PROCEDURE — 99213 OFFICE O/P EST LOW 20 MIN: CPT | Performed by: PHYSICIAN ASSISTANT

## 2022-08-08 RX ORDER — METHYLPREDNISOLONE 4 MG/1
TABLET ORAL
Qty: 21 TABLET | Refills: 0 | Status: SHIPPED | OUTPATIENT
Start: 2022-08-08 | End: 2022-12-19

## 2022-08-08 ASSESSMENT — FIBROSIS 4 INDEX: FIB4 SCORE: 1.14

## 2022-08-08 ASSESSMENT — ENCOUNTER SYMPTOMS
BACK PAIN: 1
MYALGIAS: 1
FALLS: 0
FLANK PAIN: 0

## 2022-08-08 NOTE — PROGRESS NOTES
"Subjective:   Hank Vazquez is a 61 y.o. 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  Pain is worse with lifting, bending, extension.  Pain improved with rest and sitting.  Pain does not radiate down the legs or into the buttocks.  Denies numbness and tingling in his lower extremities.  No saddle dysesthesias, loss of bowel or bladder control, lower extremity weakness.  Patient has had back pain in the past but this is more severe than what she is previously experienced.  He reports mild improvement with ice and his back brace.      Review of Systems   Genitourinary: Negative for dysuria, flank pain, frequency and urgency.   Musculoskeletal: Positive for back pain and myalgias. Negative for falls.       PMH:  has a past medical history of Arthritis, Diabetes ([facility]), and Essential hypertension (4/16/2015).    He has no past medical history of Anemia, Anxiety, Arrhythmia, Asthma, Cancer ([facility]), Clotting disorder ([facility]), COPD (chronic obstructive pulmonary disease) ([facility]), Depression, Diabetic neuropathy ([facility]), GERD (gastroesophageal reflux disease), Heart attack ([facility]), Heart murmur, HIV (human immunodeficiency virus infection) ([facility]), Hyperlipidemia, Kidney disease, Seizure ([facility]), Stroke ([facility]), Substance abuse ([facility]), Thyroid disease, or Tuberculosis.  MEDS:   Current Outpatient Medications:   •  methylPREDNISolone (MEDROL DOSEPAK) 4 MG Tablet Therapy Pack, Follow schedule on package instructions., Disp: 21 Tablet, Rfl: 0  •  Empagliflozin 25 MG Tab, Take 25 mg by mouth every day., Disp: 90 Tablet, Rfl: 0  •  glucose blood (COOL BLOOD GLUCOSE TEST STRIPS) strip, 1 Strip by Other route every day., Disp: 100 Strip, Rfl: 3  •  Lancets, Lancets order: Lancets for insurance approved meter. Sig: use once daily " "for high or low sugar. #100 RF x 3, Disp: 200 Each, Rfl: 2  •  Blood Glucose Monitoring Suppl Device, Meter: Dispense Device of Insurance Preference. Sig. Use as directed for blood sugar monitoring. #1. NR., Disp: 1 Each, Rfl: 0  •  lisinopril (PRINIVIL) 2.5 MG Tab, Take 1 Tablet by mouth every day., Disp: 90 Tablet, Rfl: 1  •  glimepiride (AMARYL) 4 MG Tab, Take 1 Tablet by mouth every morning. TAKE 1 TABLET BY MOUTH DAILY, Disp: 90 Tablet, Rfl: 2  •  atorvastatin (LIPITOR) 20 MG Tab, Take 1 Tablet by mouth every evening., Disp: 90 Tablet, Rfl: 3  •  budesonide (ENTOCORT EC) 3 MG Cap DR Particles capsule, , Disp: , Rfl:   •  Fenugreek 610 MG Cap, Take 610 mg by mouth every day., Disp: , Rfl:   •  metFORMIN ER (GLUCOPHAGE XR) 500 MG TABLET SR 24 HR, Take 2 Tablets by mouth every day., Disp: 180 tablet, Rfl: 3  ALLERGIES: No Known Allergies  SURGHX:   Past Surgical History:   Procedure Laterality Date   • OTHER      tubes in ears as child   • OTHER ORTHOPEDIC SURGERY      Left elbow ORIF   • TONSILLECTOMY       SOCHX:  reports that he has quit smoking. He has never used smokeless tobacco. He reports previous alcohol use. He reports previous drug use.  FH: Family history was reviewed, no pertinent findings to report   Objective:   /74 (BP Location: Right arm, Patient Position: Sitting, BP Cuff Size: Adult)   Pulse 78   Temp 36.6 °C (97.9 °F) (Temporal)   Resp 14   Ht 1.676 m (5' 6\")   Wt 86.2 kg (190 lb)   SpO2 96%   BMI 30.67 kg/m²   Physical Exam  Vitals reviewed.   Constitutional:       General: He is not in acute distress.     Appearance: Normal appearance. He is well-developed. He is not toxic-appearing.   HENT:      Head: Normocephalic and atraumatic.      Right Ear: External ear normal.      Left Ear: External ear normal.      Nose: Nose normal.   Cardiovascular:      Rate and Rhythm: Normal rate and regular rhythm.   Pulmonary:      Effort: Pulmonary effort is normal. No respiratory distress.      " Breath sounds: No stridor.   Musculoskeletal:      Comments: Back:  General: No asymmetry, bruising, or erythema appreciated  ROM: restricted  Palpation: Tender to palpation over right lumbar paraspinals in vicinity of L3-L5.  Mildly tender to palpation over spinous processes in same area, no step-offs appreciated   Strength: 5/5 hip, knee, ankle flexion/extension  Special tests: Straight leg raise -   Neuro: Sensation intact and equal bilaterally in LE's     Skin:     General: Skin is dry.   Neurological:      Comments: Alert and oriented.    Psychiatric:         Speech: Speech normal.         Behavior: Behavior normal.          Assessment/Plan:   1. Lumbar strain, initial encounter  - methylPREDNISolone (MEDROL DOSEPAK) 4 MG Tablet Therapy Pack; Follow schedule on package instructions.  Dispense: 21 Tablet; Refill: 0       No red flag symptoms.  No significant trauma.  Radiological imaging not indicated at this time.    Patient instructed to rest and avoid aggravating activities.  Try applying ice as well as damp heat.    Patient started on a Medrol Dosepak-he should not take NSAIDs concurrently.  Patient advised that this will elevate his blood sugars for several weeks.  He should monitor these more closely.    Work restrictions.  We will see patient back in 3 days for reevaluation.  If patient develops red flag symptoms such as urinary retention, loss of bowel or bladder control, perianal numbness or tingling, lower extremity weakness-patient was instructed to go to the ED for further evaluation.    Differential diagnosis, natural history, supportive care, and indications for immediate follow-up discussed.

## 2022-08-08 NOTE — LETTER
"   [facility]  [address] [place] [address] [place] [address]  Phone:  [phone] - Fax:  [phone]   [facility] Progress Report and Disability Certification  Date of Service: 8/8/2022   No Show:  No  Date / Time of Next Visit:     Claim Information   Patient Name: Hank Vazquez  Claim Number:     Employer: [employer]  Date of Injury: 8/8/2022     Insurer / TPA: [employer]  ID / SSN:     Occupation:   Diagnosis: The encounter diagnosis was Lumbar strain, initial encounter.    Medical Information   Related to Industrial Injury? 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  Pain is worse with lifting, bending, extension.  Pain improved with rest and sitting.  Pain does not radiate down the legs or into the buttocks.  Denies numbness and tingling in his lower extremities.  No saddle dysesthesias, loss of bowel or bladder control, lower extremity weakness.  Patient has had back pain in the past but this is more severe than what she is previously experienced.  He reports mild improvement with ice and his back brace.       Objective Findings: Musculoskeletal:      Comments: Back:  General: No asymmetry, bruising, or erythema appreciated  ROM: restricted  Palpation: Tender to palpation over right lumbar paraspinals in vicinity of L3-L5.  Mildly tender to palpation over spinous processes in same area, no step-offs appreciated   Strength: 5/5 hip, knee, ankle flexion/extension  Special tests: Straight leg raise -   Neuro: Sensation intact and equal bilaterally in LE's   Pre-Existing Condition(s):     Assessment:   Initial Visit    Status: Additional Care Required  Permanent Disability:No    Plan:      Diagnostics:      Comments:   No red flag symptoms.  No " significant trauma.  Radiological imaging not indicated at this time.    Patient instructed to rest and avoid aggravating activities.  Try applying ice as well as damp heat.    Patient started on a Medrol Dosepak-he should not take NSAIDs concurrently.  Patient advised that this will elevate his blood sugars for several weeks.  He should monitor these more closely.    Work restrictions.  We will see patient back in 3 days for reevaluation.  If patient develops red flag symptoms such as urinary retention, loss of bowel or bladder control, perianal numbness or tingling, lower extremity weakness-patient was instructed to go to the ED for further evaluation.    Disability Information   Status: Released to Restricted Duty    From:     Through:   Restrictions are:     Physical Restrictions   Sitting:    Standing:    Stoopin hrs/day Bendin hrs/day   Squattin hrs/day Walking:    Climbing:    Pushing:      Pulling:    Other:    Reaching Above Shoulder (L):   Reaching Above Shoulder (R):       Reaching Below Shoulder (L):    Reaching Below Shoulder (R):      Not to exceed Weight Limits   Carrying(hrs):   Weight Limit(lb): < or = to 10 pounds Lifting(hrs):   Weight  Limit(lb): < or = to 10 pounds   Comments:      Repetitive Actions   Hands: i.e. Fine Manipulations from Grasping:     Feet: i.e. Operating Foot Controls:     Driving / Operate Machinery:     Health Care Provider’s Original or Electronic Signature  Ridge Anna P.A.-C. Health Care Provider’s Original or Electronic Signature    Mike Elizabeth MD         Clinic Name / Location: [address] [address] Clinic Phone Number: Dept: [phone]   Appointment Time: 3:20 Pm Visit Start Time: 3:46 PM   Check-In Time:  3:28 Pm Visit Discharge Time:  4:13 PM   Original-Treating Physician or Chiropractor    Page 2-Insurer/TPA    Page 3-Employer    Page 4-Employee

## 2022-08-08 NOTE — LETTER
"EMPLOYEE’S CLAIM FOR COMPENSATION/ REPORT OF INITIAL TREATMENT  FORM C-4    EMPLOYEE’S CLAIM - PROVIDE ALL INFORMATION REQUESTED   First Name  Hank Last Name  George Birthdate                    1960                Sex  male Claim Number (Insurer’s Use Only)    Home Address  [address] Age  61 y.o. Height  1.676 m (5' 6\") Weight  86.2 kg (190 lb) [facility]     Located within [place] Zip  [address] Telephone  [phone] (home)    Mailing Address  [address] Zip  [address] Primary Language Spoken  English    Insurer  [employer] Third-Party   [employer]   Employee's Occupation (Job Title) When Injury or Occupational Disease Occurred      Employer's Name/Company Name  [employer]  Telephone  [phone]    Office Mail Address (Number and Street)   [address]  [address]    Date of Injury  8/8/2022               Hours Injury  9:30 AM Date Employer Notified  8/8/2022 Last Day of Work after Injury     or Occupational Disease  8/8/2022 Supervisor to Whom Injury     Reported  Rashad   Address or Location of Accident (if applicable)  Work [1]   What were you doing at the time of accident? (if applicable)  Removing exhaust pipes on truck    How did this injury or occupational disease occur? (Be specific an answer in detail. 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   Witnesses to the Accident  Drew      Nature of Injury or Occupational Disease  Workers' Compensation  Part(s) of Body Injured or Affected  Lower Back Area (Lumbar Area & Lumbo-Sacral), ,     I certify that the above is true and correct to the best of my knowledge and that I have provided this " information in order to obtain the benefits of [place]  I hereby authorize any physician, chiropractor, surgeon, practitioner, or other person, any hospital, including [facility] or [facility], any medical service organization, any insurance company, or other institution or organization to release to each other, any medical or other information, including benefits paid or payable, pertinent to this injury or disease, except information relative to diagnosis, treatment and/or counseling for AIDS, psychological conditions, alcohol or controlled substances, for which I must give specific authorization.  A Photostat of this authorization shall be as valid as the original.     Date 8/8/ 22   Place [facility] Employee’s Original or  *Electronic Signature   THIS REPORT MUST BE COMPLETED AND MAILED WITHIN 3 WORKING DAYS OF TREATMENT   Place  [facility]  Name of Facility  [facility]   Date  8/8/2022 Diagnosis and Description of Injury or Occupational Disease  (S39.012A) Lumbar strain, initial encounter Is there evidence the injured employee was under the influence of alcohol and/or another controlled substance at the time of accident?  ? No ? Yes (if yes, please explain)    Hour  3:46 PM   The encounter diagnosis was Lumbar strain, initial encounter. No   Treatment  Medrol Dosepak, work restrictions.  Have you advised the patient to remain off work five days or     more?    X-Ray Findings      ? Yes Indicate dates:   From   To      From information given by the employee, together with medical evidence, can        you directly connect this injury or occupational disease as job incurred?  Yes ? No If no, is the injured employee capable of:  ? full duty  No ? modified duty  Yes   Is additional medical care by a physician indicated?  Yes If Modified Duty, Specify any Limitations /  "Restrictions  No stooping, bending, squatting.  10 pound weight restriction.   Do you know of any previous injury or disease contributing to this condition or occupational disease?  ? Yes ? No (Explain if yes)                          No   Date  8/8/2022 Print Health Care Provider's   Yessenia Titus P.A.-C. I certify the employer’s copy of  this form was mailed on:   Address  [address] Insurer’s Use Only     [place] Zip  [address]    Provider’s Tax ID Number  [id number] Telephone  Dept: [phone]             Health Care Provider’s Original or Electronic Signature  e-SignYESSENIA TITUS P.A.-C. Degree (MD,DO, DC,PATRUONG,APRN)         * Complete and attach Release of Information (Form C-4A) when injured employee signs C-4 Form electronically  ORIGINAL - TREATING HEALTHCARE PROVIDER PAGE 2 - INSURER/TPA PAGE 3 - EMPLOYER PAGE 4 - EMPLOYEE             Form C-4 (rev.08/21)           BRIEF DESCRIPTION OF RIGHTS AND BENEFITS  (Pursuant to [place] 616C.050)    Notice of Injury or Occupational Disease (Incident Report Form C-1): If an injury or occupational disease (OD) arises out of and in the course of employment, you must provide written notice to your employer as soon as practicable, but no later than 7 days after the accident or OD. Your employer shall maintain a sufficient supply of the required forms.    Claim for Compensation (Form C-4): If medical treatment is sought, the form C-4 is available at the place of initial treatment. A completed \"Claim for Compensation\" (Form C-4) must be filed within 90 days after an accident or OD. The treating physician or chiropractor must, within 3 working days after treatment, complete and mail to the employer, the employer's insurer and third-party , the Claim for Compensation.    Medical Treatment: If you require medical treatment for your on-the-job injury or OD, you may be required to select a physician or chiropractor from a list provided by your " workers’ compensation insurer, if it has contracted with an Organization for Managed Care (MCO) or Preferred Provider Organization (PPO) or providers of health care. If your employer has not entered into a contract with an MCO or PPO, you may select a physician or chiropractor from the Panel of Physicians and Chiropractors. Any medical costs related to your industrial injury or OD will be paid by your insurer.    Temporary Total Disability (TTD): If your doctor has certified that you are unable to work for a period of at least 5 consecutive days, or 5 cumulative days in a 20-day period, or places restrictions on you that your employer does not accommodate, you may be entitled to TTD compensation.    Temporary Partial Disability (TPD): If the wage you receive upon reemployment is less than the compensation for TTD to which you are entitled, the insurer may be required to pay you TPD compensation to make up the difference. TPD can only be paid for a maximum of 24 months.    Permanent Partial Disability (PPD): When your medical condition is stable and there is an indication of a PPD as a result of your injury or OD, within 30 days, your insurer must arrange for an evaluation by a rating physician or chiropractor to determine the degree of your PPD. The amount of your PPD award depends on the date of injury, the results of the PPD evaluation, your age and wage.    Permanent Total Disability (PTD): If you are medically certified by a treating physician or chiropractor as permanently and totally disabled and have been granted a PTD status by your insurer, you are entitled to receive monthly benefits not to exceed 66 2/3% of your average monthly wage. The amount of your PTD payments is subject to reduction if you previously received a lump-sum PPD award.    Vocational Rehabilitation Services: You may be eligible for vocational rehabilitation services if you are unable to return to the job due to a permanent physical  impairment or permanent restrictions as a result of your injury or occupational disease.    Transportation and Per Fabi Reimbursement: You may be eligible for travel expenses and per fabi associated with medical treatment.    Reopening: You may be able to reopen your claim if your condition worsens after claim closure.     Appeal Process: If you disagree with a written determination issued by the insurer or the insurer does not respond to your request, you may appeal to the Department of Administration, , by following the instructions contained in your determination letter. You must appeal the determination within 70 days from the date of the determination letter at [address], [address], [place], [place] [address], or [address], [place] [address]. If you disagree with the  decision, you may appeal to the Department of Administration, . You must file your appeal within 30 days from the date of the  decision letter at [address], [address], [place], [place] [address], or [address], [place] [address]. If you disagree with a decision of an , you may file a petition for judicial review with the District Court. You must do so within 30 days of the Appeal Officer’s decision. You may be represented by an  at your own expense or you may contact the [facility] for possible representation.    [place]  for Injured Workers (NAIW): If you disagree with a  decision, you may request that NAIW represent you without charge at an  Hearing. For information regarding denial of benefits, you may contact the [facility] at: [address], [address], [place] [address], [phone] [phone], or [address], [address] [address], [phone]    To File a Complaint with the Division: If you wish to file a complaint with the   of the Division of Industrial Relations (DIR),  please contact the Workers’ Compensation Section, [address], [address], [place], [place] [address], telephone [phone]) [phone], or [address], [place], [place] [address], telephone [phone].    For assistance with Workers’ Compensation Issues: You may contact the [facility] Office for Consumer Health Assistance, [address], [address], [place], [place] [address], Toll Free 1-352.219.3434, Web site: http://[facility].[place].gov/Programs/RAINA E-mail: raina@[facility].nv.gov              __________________________________________________________________                                    ___8/8/22______________            Employee Name / Signature                                                                                                                            Date                                                                                                                                                                                                                              D-2 (rev. 10/20)

## 2022-08-11 ENCOUNTER — OCCUPATIONAL MEDICINE (OUTPATIENT)
Dept: URGENT CARE | Facility: PHYSICIAN GROUP | Age: 62
End: 2022-08-11
Payer: COMMERCIAL

## 2022-08-11 VITALS
WEIGHT: 186 LBS | BODY MASS INDEX: 31.76 KG/M2 | HEIGHT: 64 IN | OXYGEN SATURATION: 98 % | RESPIRATION RATE: 14 BRPM | SYSTOLIC BLOOD PRESSURE: 134 MMHG | HEART RATE: 77 BPM | DIASTOLIC BLOOD PRESSURE: 64 MMHG | TEMPERATURE: 97.6 F

## 2022-08-11 DIAGNOSIS — S39.012D LUMBAR SPINE STRAIN, SUBSEQUENT ENCOUNTER: ICD-10-CM

## 2022-08-11 PROCEDURE — 99213 OFFICE O/P EST LOW 20 MIN: CPT | Performed by: FAMILY MEDICINE

## 2022-08-11 ASSESSMENT — FIBROSIS 4 INDEX: FIB4 SCORE: 1.14

## 2022-08-11 NOTE — PROGRESS NOTES
"Subjective     Hank Nikhil Vazquez is a 61 y.o. male who presents with Follow-Up      DOI: 8/8/2022  F/u visit lumbar strain. VANNA: bending and lifting tailpipe working as a   He is slowly improving. Approximately 25% better. He continues to have pain R>L. Intermittent radiation to right leg. No myelopathy. No fever. Prior back injury without surgery. No second job or outside activity contributing. He has taken medrol dosepack for 2 days. No other aggravating or alleviating factors.       HPI    ROS           Objective     /64 (BP Location: Left arm, Patient Position: Sitting, BP Cuff Size: Adult)   Pulse 77   Temp 36.4 °C (97.6 °F) (Temporal)   Resp 14   Ht 1.626 m (5' 4\")   Wt 84.4 kg (186 lb)   SpO2 98%   BMI 31.93 kg/m²      Physical Exam    Back: tender right paralumbar region. Flexion limited to 60 degrees.   Neuro: Bilateral lower extremity strength and sensory intact. Negative straight leg raise. DTR 2+/4 bilateral knees                     Assessment & Plan    visit 8/8/22 reviewed     1. Lumbar spine strain, subsequent encounter    Differential diagnosis, natural history, supportive care, and indications for immediate follow-up discussed at length.     Continue light duty and medrol dosepack  F/u 5 days                  "

## 2022-08-11 NOTE — LETTER
[address] [place] [address]  Phone:  [phone] - Fax:  [phone]   [facility] Progress Report and Disability Certification  Date of Service: 2022   No Show:  No  Date / Time of Next Visit: 2022 @915   Claim Information   Patient Name: Hank Vazquez  Claim Number:     Employer: [employer]  Date of Injury: 2022     Insurer / TPA: [employer]  ID / SSN:     Occupation:   Diagnosis: The encounter diagnosis was Lumbar spine strain, subsequent encounter.    Medical Information   Related to Industrial Injury? Yes    Subjective Complaints:  DOI: 2022  F/u visit lumbar strain. VANNA: bending and lifting tailpipe working as a   He is slowly improving. Approximately 25% better. He continues to have pain R>L. Intermittent radiation to right leg. No myelopathy. No fever. Prior back injury without surgery. No second job or outside activity contributing. He has taken medrol dosepack for 2 days. No other aggravating or alleviating factors.     Objective Findings: Back: tender right paralumbar region. Flexion limited to 60 degrees.   Neuro: Bilateral lower extremity strength and sensory intact. Negative straight leg raise. DTR 2+/4 bilateral knees     Pre-Existing Condition(s):     Assessment:   Condition Improved    Status: Additional Care Required  Permanent Disability:No    Plan:   Comments:continue light duty, complete corticosteroids    Diagnostics:      Comments:       Disability Information   Status: Released to Restricted Duty    From:  2022  Through: 2022 Restrictions are: Temporary   Physical Restrictions   Sitting:    Standing:    Stoopin hrs/day Bendin hrs/day   Squatting:    Walking:    Climbing:    Pushing:  < or = to 2 hrs/day   Pulling:  < or = to 2 hrs/day Other:    Reaching Above Shoulder (L):   Reaching Above Shoulder (R):       Reaching Below Shoulder (L):     Reaching Below Shoulder (R):      Not to exceed Weight Limits   Carrying(hrs): 2 Weight Limit(lb): < or = to 10 pounds Lifting(hrs): 2 Weight  Limit(lb): < or = to 10 pounds   Comments:      Repetitive Actions   Hands: i.e. Fine Manipulations from Grasping:     Feet: i.e. Operating Foot Controls:     Driving / Operate Machinery:     Health Care Provider’s Original or Electronic Signature  Kendall Hernandze M.D. Health Care Provider’s Original or Electronic Signature    Mike Elizabeth MD         Clinic Name / Location: [address] [address] Clinic Phone Number: Dept: [phone]   Appointment Time: 9:15 Am Visit Start Time: 9:13 AM   Check-In Time:  9:08 Am Visit Discharge Time: 9:35 AM   Original-Treating Physician or Chiropractor    Page 2-Insurer/TPA    Page 3-Employer    Page 4-Employee

## 2022-08-16 DIAGNOSIS — E11.65 TYPE 2 DIABETES MELLITUS WITH HYPERGLYCEMIA, WITHOUT LONG-TERM CURRENT USE OF INSULIN (HCC): ICD-10-CM

## 2022-08-16 RX ORDER — METFORMIN HYDROCHLORIDE 500 MG/1
1000 TABLET, EXTENDED RELEASE ORAL DAILY
Qty: 180 TABLET | Refills: 3 | Status: SHIPPED | OUTPATIENT
Start: 2022-08-16 | End: 2022-10-31 | Stop reason: SDUPTHER

## 2022-08-16 NOTE — TELEPHONE ENCOUNTER
Received request via: Patient    Was the patient seen in the last year in this department? Yes    Does the patient have an active prescription (recently filled or refills available) for medication(s) requested? No    Last ov 7/14/22

## 2022-08-17 ENCOUNTER — OCCUPATIONAL MEDICINE (OUTPATIENT)
Dept: URGENT CARE | Facility: PHYSICIAN GROUP | Age: 62
End: 2022-08-17
Payer: COMMERCIAL

## 2022-08-17 VITALS
DIASTOLIC BLOOD PRESSURE: 70 MMHG | TEMPERATURE: 97.9 F | WEIGHT: 186 LBS | SYSTOLIC BLOOD PRESSURE: 126 MMHG | HEART RATE: 81 BPM | RESPIRATION RATE: 17 BRPM | OXYGEN SATURATION: 96 % | BODY MASS INDEX: 29.89 KG/M2 | HEIGHT: 66 IN

## 2022-08-17 DIAGNOSIS — Z02.6 ENCOUNTER FOR ASSESSMENT OF WORK-RELATED CAUSATION OF INJURY: ICD-10-CM

## 2022-08-17 DIAGNOSIS — S39.012D LUMBAR SPINE STRAIN, SUBSEQUENT ENCOUNTER: ICD-10-CM

## 2022-08-17 PROCEDURE — 99213 OFFICE O/P EST LOW 20 MIN: CPT | Performed by: PHYSICIAN ASSISTANT

## 2022-08-17 RX ORDER — CYCLOBENZAPRINE HCL 5 MG
5-10 TABLET ORAL 3 TIMES DAILY PRN
Qty: 30 TABLET | Refills: 0 | Status: SHIPPED | OUTPATIENT
Start: 2022-08-17 | End: 2022-12-19

## 2022-08-17 ASSESSMENT — PAIN SCALES - GENERAL: PAINLEVEL: 7=MODERATE-SEVERE PAIN

## 2022-08-17 ASSESSMENT — FIBROSIS 4 INDEX: FIB4 SCORE: 1.14

## 2022-08-17 NOTE — LETTER
[facility] [place]  [address] [place] [address] [place] [address]  Phone:  [phone] - Fax:  [phone]   [facility] Progress Report and Disability Certification  Date of Service: 8/17/2022   No Show:  No  Date / Time of Next Visit: 8/22/2022([facility] MED)   Claim Information   Patient Name: Hank Vazquez  Claim Number:     Employer: [employer]  Date of Injury: 8/8/2022     Insurer / TPA: [employer]  ID / SSN:     Occupation:   Diagnosis: Diagnoses of Lumbar spine strain, subsequent encounter and Encounter for assessment of work-related causation of injury were pertinent to this visit.    Medical Information   Related to Industrial Injury? Yes    Subjective Complaints:  Employer's Name:  [employer]      Follow-Up: Back injury. 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  He was picking up an exhaust pipe and moving.            Location of Injury:  Workers' Compensation, Lower Back Area (Lumbar Area & Lumbo-Sacral)      Update 8/17/22:  Patient reports persistent pain to the lower back and right gluteal area.  He has some pain to mid thoracic region as well.  No weakness, no significant numbness and tingling.  Has been tolerating work restrictions.  Pain is only very mildly improved, he still needs to walk slowly and is having difficulty bending and standing up straight.  He can only take limited amounts of NSAIDs and Tylenol due to history of colon and liver issues         Objective Findings: There is diffuse ttp to lower lumbar spine and lumbosacral spine  There is diffuse tenderness to the lumbar paraspinous musculature  Lumbar spine ROM diminished by 50% in a multiplanar fashion  Motor strength 5/5 b/l LE's  Sensation intact to light touch and pinprick  DTRs 1+  No Clonus  Straight leg raise is negative  Distal pulses are  intact  Gait is non-antalgic       Pre-Existing Condition(s):     Assessment:   Condition Same    Status: Discharged / Care Transfer  Permanent Disability:No    Plan: MedicationPTTransfer CareMedication (NOT at Work)  Comments:cyclobenzaprine prn spasms, tylenol prn    Diagnostics:      Comments:       Disability Information   Status: Released to Restricted Duty    From:  2022  Through: 2022 Restrictions are: Temporary   Physical Restrictions   Sitting:    Standing:  < or = to 2 hrs/day Stoopin hrs/day Bendin hrs/day   Squattin hrs/day Walking:    Climbing:    Pushing:  < or = to 2 hrs/day   Pulling:  < or = to 2 hrs/day Other:    Reaching Above Shoulder (L):   Reaching Above Shoulder (R):       Reaching Below Shoulder (L):    Reaching Below Shoulder (R):      Not to exceed Weight Limits   Carrying(hrs):   Weight Limit(lb): < or = to 10 pounds Lifting(hrs):   Weight  Limit(lb): < or = to 10 pounds   Comments: Continue work restrictions as above  Transfer of care to occupational medicine given persistence of symptoms despite conservative measures, urgent  Urgent referral to physical therapy  May take NSAIDs and Tylenol and limited dosing  Alternate ice and heat as needed for pain    Repetitive Actions   Hands: i.e. Fine Manipulations from Grasping:     Feet: i.e. Operating Foot Controls:     Driving / Operate Machinery:     Health Care Provider’s Original or Electronic Signature  аТтьяна Bhatt P.A.-C. Health Care Provider’s Original or Electronic Signature    Mike Elizabeth MD         Clinic Name / Location: [address] [address] Clinic Phone Number: Dept: [phone]   Appointment Time: 9:15 Am Visit Start Time: 9:08 AM   Check-In Time:  9:03 Am Visit Discharge Time:  9:55 AM   Original-Treating Physician or Chiropractor    Page 2-Insurer/TPA    Page 3-Employer    Page 4-Employee

## 2022-08-17 NOTE — PROGRESS NOTES
"Subjective     Hank Nikhil Vazquez is a 61 y.o. male who presents with Follow-Up (Back injury.  8/8/22)      Employer's Name:  [employer]      Follow-Up: Back injury.  8/8/22      Date of Injury:  8/8/2022    Mechanism of Injury:  Twisting and turning removing exhaust pipes on a 2500 truck  Removing exhaust pipes on truck.  He was picking up an exhaust pipe and moving.            Location of Injury:  Workers' Compensation, Lower Back Area (Lumbar Area & Lumbo-Sacral)      Update 8/17/22:  Patient reports persistent pain to the lower back and right gluteal area.  He has some pain to mid thoracic region as well.  No weakness, no significant numbness and tingling.  Has been tolerating work restrictions.  Pain is only very mildly improved, he still needs to walk slowly and is having difficulty bending and standing up straight.  He can only take limited amounts of NSAIDs and Tylenol due to history of colon and liver issues                      Objective     /70 (BP Location: Right arm, Patient Position: Sitting, BP Cuff Size: Adult)   Pulse 81   Temp 36.6 °C (97.9 °F) (Temporal)   Resp 17   Ht 1.676 m (5' 6\")   Wt 84.4 kg (186 lb)   SpO2 96%   BMI 30.02 kg/m²      Physical Exam  Vitals and nursing note reviewed.   Constitutional:       General: He is not in acute distress.     Appearance: Normal appearance. He is not ill-appearing.   HENT:      Head: Normocephalic.   Eyes:      Extraocular Movements: Extraocular movements intact.      Pupils: Pupils are equal, round, and reactive to light.   Cardiovascular:      Rate and Rhythm: Normal rate.   Pulmonary:      Effort: Pulmonary effort is normal.   Skin:     General: Skin is warm.      Findings: No rash.   Neurological:      Mental Status: He is alert and oriented to person, place, and time.   Psychiatric:         Thought Content: Thought content normal.         Judgment: Judgment normal.       There is diffuse ttp to lower lumbar spine and " lumbosacral spine  There is diffuse tenderness to the lumbar paraspinous musculature  Lumbar spine ROM diminished by 50% in a multiplanar fashion  Motor strength 5/5 b/l LE's  Sensation intact to light touch and pinprick  DTRs 1+  No Clonus  Straight leg raise is negative  Distal pulses are intact  Gait is non-antalgic                       Assessment & Plan        1. Lumbar spine strain, subsequent encounter    - Referral to Physical Therapy  - Referral to Occupational Medicine  - cyclobenzaprine (FLEXERIL) 5 mg tablet; Take 1-2 Tablets by mouth 3 times a day as needed for Muscle Spasms.  Dispense: 30 Tablet; Refill: 0    2. Encounter for assessment of work-related causation of injury       Continue work restrictions as above   Transfer of care to occupational medicine given persistence of symptoms despite conservative measures, urgent   Urgent referral to physical therapy   May take NSAIDs and Tylenol and limited dosing   Alternate ice and heat as needed for pain  Cyclobenzaprine as needed for muscle spasm, do not use at work due to risk for sedation

## 2022-08-23 ENCOUNTER — OCCUPATIONAL MEDICINE (OUTPATIENT)
Dept: OCCUPATIONAL MEDICINE | Facility: CLINIC | Age: 62
End: 2022-08-23
Payer: COMMERCIAL

## 2022-08-23 VITALS
SYSTOLIC BLOOD PRESSURE: 124 MMHG | HEIGHT: 66 IN | BODY MASS INDEX: 29.89 KG/M2 | RESPIRATION RATE: 16 BRPM | WEIGHT: 186 LBS | DIASTOLIC BLOOD PRESSURE: 78 MMHG | OXYGEN SATURATION: 93 % | HEART RATE: 83 BPM

## 2022-08-23 DIAGNOSIS — S39.012D STRAIN OF LUMBAR REGION, SUBSEQUENT ENCOUNTER: ICD-10-CM

## 2022-08-23 PROCEDURE — 99203 OFFICE O/P NEW LOW 30 MIN: CPT | Performed by: PREVENTIVE MEDICINE

## 2022-08-23 ASSESSMENT — FIBROSIS 4 INDEX: FIB4 SCORE: 1.14

## 2022-08-23 NOTE — LETTER
[address],   [address] [place] [address]  Phone:  [phone] - Fax:  [phone]   [facility] Progress Report and Disability Certification  Date of Service: 8/23/2022   No Show:  No  Date / Time of Next Visit: 9/13/2022 @8:00am   Claim Information   Patient Name: Hank Vazquez  Claim Number:     Employer: [employer]  Date of Injury: 8/8/2022     Insurer / TPA: [employer]  ID / SSN:     Occupation:   Diagnosis: The encounter diagnosis was Strain of lumbar region, subsequent encounter.    Medical Information   Related to Industrial Injury? Yes    Subjective Complaints:  DOI:  8/8/2022 60 yo injured worker presents with low back pain. VANNA:  Twisting and turning removing exhaust pipes on a 2500 truck  Removing exhaust pipes on truck.  He was picking up an exhaust pipe and moving. He was seen in [facility], advised NSAIDS, muscle relaxer, oral steroid, physical therapy and work restrictions.  Patient states that overall symptoms are about the same.  Pain is mostly right-sided now with some pain going down the leg.  Denies any right numbness or tingling.  Patient states that he has not heard about physical therapy being approved yet.  Working light duty.   Objective Findings: Lumbar: No gross deformity.  Tenderness palpation of the right SI joint.  Range of motion diminished to less than 45 degrees flexion, painful with rotation.  Antalgic gait.   Pre-Existing Condition(s):     Assessment:   Condition Same    Status: Additional Care Required  Permanent Disability:No    Plan:      Diagnostics:      Comments:  Referral for for physical therapy, pending approval  Placed referral for chiropractor treatments as well, patient has had success with this in the past  OTC ibuprofen/Tylenol as needed  Heat/ice as needed  OTC muscle creams/ointments as needed  Restricted duty  Follow-up 3 weeks    Disability Information   Status: Released  to Restricted Duty    From:  8/23/2022  Through: 9/13/2022 Restrictions are: Temporary   Physical Restrictions   Sitting:    Standing:  < or = to 2 hrs/day Stooping:  < or = to 1 hr/day Bending:  < or = to 1 hr/day   Squatting:  < or = to 1 hr/day Walking:  < or = to 2 hrs/day Climbing:    Pushing:  < or = to 2 hrs/day   Pulling:  < or = to 2 hrs/day Other:    Reaching Above Shoulder (L):   Reaching Above Shoulder (R):       Reaching Below Shoulder (L):    Reaching Below Shoulder (R):      Not to exceed Weight Limits   Carrying(hrs):   Weight Limit(lb): < or = to 10 pounds Lifting(hrs):   Weight  Limit(lb): < or = to 10 pounds   Comments:      Repetitive Actions   Hands: i.e. Fine Manipulations from Grasping:     Feet: i.e. Operating Foot Controls:     Driving / Operate Machinery:     Health Care Provider’s Original or Electronic Signature  Franklin Irby D.O. Health Care Provider’s Original or Electronic Signature    Mike Elizabeth MD         Clinic Name / Location: [address],   [address] [place], [place] [address] [place] [address] Clinic Phone Number: Dept: [phone]   Appointment Time: 9:15 Am Visit Start Time: 8:24 AM   Check-In Time:  8:18 Am Visit Discharge Time:  855am   Original-Treating Physician or Chiropractor    Page 2-Insurer/TPA    Page 3-Employer    Page 4-Employee

## 2022-08-23 NOTE — LETTER
August 29, 2022        Please excuse Hank Vazquez from work on August 9, 2022 due to medication side effects from medication he was prescribed for his work comp injury.        Franklin Irby D.O.

## 2022-08-23 NOTE — PROGRESS NOTES
"Subjective:     Hank Vazquez is a 61 y.o. male who presents for Follow-Up (WC [facility] DOI 8/8/22 back, worse, rm 17)      DOI:  8/8/2022 60 yo injured worker presents with low back pain. VANNA:  Twisting and turning removing exhaust pipes on a 2500 truck  Removing exhaust pipes on truck.  He was picking up an exhaust pipe and moving. He was seen in [facility], advised NSAIDS, muscle relaxer, oral steroid, physical therapy and work restrictions.  Patient states that overall symptoms are about the same.  Pain is mostly right-sided now with some pain going down the leg.  Denies any right numbness or tingling.  Patient states that he has not heard about physical therapy being approved yet.  Working light duty.    ROS: All systems were reviewed on intake form, form was reviewed and signed. See scanned documents in media. Pertinent positives and negatives included in HPI.    PMH: No pertinent past medical history to this problem  MEDS: Medications were reviewed in Epic  ALLERGIES: No Known Allergies  SOCHX: Works as a  at [employer]  FH: No pertinent family history to this problem       Objective:     /78   Pulse 83   Resp 16   Ht 1.676 m (5' 6\")   Wt 84.4 kg (186 lb)   SpO2 93%   BMI 30.02 kg/m²     Constitutional: Patient is in no acute distress. Appears well-developed and well-nourished.   HENT: Normocephalic and atraumatic. EOM are normal. No scleral icterus.   Cardiovascular: Normal rate.    Pulmonary/Chest: Effort normal. No respiratory distress.   Neurological: Patient is alert and oriented to person, place, and time.   Skin: Skin is warm and dry.   Psychiatric: Normal mood and affect. Behavior is normal.     Lumbar: No gross deformity.  Tenderness palpation of the right SI joint.  Range of motion diminished to less than 45 degrees flexion, painful with rotation.  Antalgic gait.    Assessment/Plan:       1. Strain of lumbar region, subsequent encounter  - Referral to " Chiropractic    Released to Restricted Duty FROM 8/23/2022 TO 9/13/2022     Referral for for physical therapy, pending approval  Placed referral for chiropractor treatments as well, patient has had success with this in the past  OTC ibuprofen/Tylenol as needed  Heat/ice as needed  OTC muscle creams/ointments as needed  Restricted duty  Follow-up 3 weeks    Differential diagnosis, natural history, supportive care, and indications for immediate follow-up discussed.    Approximately 35 minutes were spent in reviewing notes, preparing for visit, obtaining history, exam and evaluation, patient counseling/education and post visit documentation/orders.

## 2022-09-09 ENCOUNTER — NON-PROVIDER VISIT (OUTPATIENT)
Dept: VASCULAR LAB | Facility: MEDICAL CENTER | Age: 62
End: 2022-09-09
Attending: INTERNAL MEDICINE
Payer: COMMERCIAL

## 2022-09-09 VITALS — SYSTOLIC BLOOD PRESSURE: 127 MMHG | HEART RATE: 64 BPM | DIASTOLIC BLOOD PRESSURE: 75 MMHG

## 2022-09-09 DIAGNOSIS — E11.65 TYPE 2 DIABETES MELLITUS WITH HYPERGLYCEMIA, WITHOUT LONG-TERM CURRENT USE OF INSULIN (HCC): ICD-10-CM

## 2022-09-09 PROCEDURE — 99212 OFFICE O/P EST SF 10 MIN: CPT

## 2022-09-10 NOTE — NON-PROVIDER
Hepatitis C Clinic Visit  Date of Service: 09/09/22    Primary care physician: LEEANN Bucio    Reason for consult: Hepatitis C    Hank Vazquez is a 61 y.o. old patient who comes in today for initiation of treatment/follow up of Hepatitis C. Patient reports that he feels relatively well and healthy.  Denies fatigue or jaundice, does report occasional, transient RLQ pain.  Patient states that he did not have any issues with appointments or transport to clinic.      Past Medical History:  Patient Active Problem List    Diagnosis Date Noted    Elevated liver enzymes 04/12/2022    Chronic hepatitis C without hepatic coma (HCC) 04/12/2022    Type 2 diabetes mellitus (HCC) 06/29/2021    Lymphocytic colitis 06/29/2021    Essential hypertension 04/16/2015       Past Surgical History:  Past Surgical History:   Procedure Laterality Date    OTHER      tubes in ears as child    OTHER ORTHOPEDIC SURGERY      Left elbow ORIF    TONSILLECTOMY         Allergies:  Patient has no known allergies.    SOCIAL HISTORY  Social History     Tobacco Use   Smoking Status Former   Smokeless Tobacco Never      Social History     Socioeconomic History    Marital status:      Spouse name: Not on file    Number of children: Not on file    Years of education: Not on file    Highest education level: Not on file   Occupational History    Not on file   Tobacco Use    Smoking status: Former    Smokeless tobacco: Never   Vaping Use    Vaping Use: Never used   Substance and Sexual Activity    Alcohol use: Not Currently    Drug use: Not Currently    Sexual activity: Yes     Partners: Female   Other Topics Concern    Not on file   Social History Narrative    Not on file     Social Determinants of Health     Financial Resource Strain: Not on file   Food Insecurity: Not on file   Transportation Needs: Not on file   Physical Activity: Not on file   Stress: Not on file   Social Connections: Not on file   Intimate Partner Violence:  Not on file   Housing Stability: Not on file       Medications:    Current Outpatient Medications:     cyclobenzaprine (FLEXERIL) 5 mg tablet, Take 1-2 Tablets by mouth 3 times a day as needed for Muscle Spasms., Disp: 30 Tablet, Rfl: 0    metFORMIN ER (GLUCOPHAGE XR) 500 MG TABLET SR 24 HR, Take 2 Tablets by mouth every day., Disp: 180 Tablet, Rfl: 3    methylPREDNISolone (MEDROL DOSEPAK) 4 MG Tablet Therapy Pack, Follow schedule on package instructions. (Patient not taking: Reported on 8/17/2022), Disp: 21 Tablet, Rfl: 0    Empagliflozin 25 MG Tab, Take 25 mg by mouth every day., Disp: 90 Tablet, Rfl: 0    glucose blood (COOL BLOOD GLUCOSE TEST STRIPS) strip, 1 Strip by Other route every day., Disp: 100 Strip, Rfl: 3    Lancets, Lancets order: Lancets for insurance approved meter. Sig: use once daily for high or low sugar. #100 RF x 3, Disp: 200 Each, Rfl: 2    Blood Glucose Monitoring Suppl Device, Meter: Dispense Device of Insurance Preference. Sig. Use as directed for blood sugar monitoring. #1. NR., Disp: 1 Each, Rfl: 0    lisinopril (PRINIVIL) 2.5 MG Tab, Take 1 Tablet by mouth every day. (Patient not taking: Reported on 8/17/2022), Disp: 90 Tablet, Rfl: 1    glimepiride (AMARYL) 4 MG Tab, Take 1 Tablet by mouth every morning. TAKE 1 TABLET BY MOUTH DAILY, Disp: 90 Tablet, Rfl: 2    atorvastatin (LIPITOR) 20 MG Tab, Take 1 Tablet by mouth every evening., Disp: 90 Tablet, Rfl: 3    budesonide (ENTOCORT EC) 3 MG Cap DR Particles capsule, , Disp: , Rfl:     [place] 610 MG Cap, Take 610 mg by mouth every day., Disp: , Rfl:     Hepatitis Medication History and Current Regimen  N/A, new start     Labs: Reviewed    Physical Examination:  Vital signs: /75   Pulse 64      DATA REVIEW  HCV genotype: 2b  HCV resistance:   Prior treatment status: Naive  Risk factors: IVDU history  Cirrhosis: Possible  CTP score: Likely class A  FIB-4 score: 0.51 in June 2022  Liver fibrosis panel: Possible cirrhosis per US  HCV  PCR at start of treatment:  55,344,584  HBV serologies: Surface antigen, core and surface antibodies all negative  HAV serology: Non-reactive  HIV Ab: Non-reactive      Other Pertinent Blood Work:   Lab Results   Component Value Date    SODIUM 142 03/22/2022    POTASSIUM 5.5 03/22/2022    CHLORIDE 103 03/22/2022    CO2 29 03/22/2022    ANION 10.0 03/22/2022    GLUCOSE 191 (H) 03/22/2022    BUN 17 03/22/2022    CREATININE 0.92 03/22/2022    CALCIUM 9.7 03/22/2022    ASTSGOT 27 07/07/2022    ALTSGPT 45 07/07/2022    ALKPHOSPHAT 54 07/07/2022    TBILIRUBIN 0.8 07/07/2022    ALBUMIN 4.2 07/07/2022    AGRATIO 1.8 03/22/2022       HCV medication checked for DDI with current medication  Yes, no current interactions exist    Hepatitis Vaccines Received  Hep A: Not Immune  Hep B: Not Immune    ASSESSMENT AND PLAN    1. Hepatitis C     HCV Recommendations from Today's Visit  Will start Epclusa 1 tablet PO qday for x 12 weeks. Counseled the patient on the side effects of headache, nausea, trouble sleeping, loss of strength/energy, muscle pain, and darkening of the urine. Counseled the patient if he experiences any abdominal pain, abdominal distension, jaundice, and scleral icterus to notify his PCP or MD if any of these symptoms occur or persist during treatment.     Counseled the patient about hepatitis C, complications, side effects, prevention, and treatment. Went over lifestyle modifications (tobacco, alcohol use, dietary, exercise, etc) with the patient. Patientcurrently smokes one pack per day and drinks one shot of hard liquor daily, does not wish to quit at this time.      Went over the process of getting the medication approved through his insurance and that it may take over a week to get the medication approved/filled. Reported to the patient that labs will need to be done about 4 weeks after treatment has started and 12 weeks after the end of treatment. Patient voiced understanding. Provided patient with my contact  information.    Recommended to the patient to avoid consuming alcohol.    Non-HCV Medications Recommendations from Today’s Visit: None    Lifestyle Recommendations From Today’s Visit:   Exercise: Recommended 30 minutes moderate intensity exercise at least 5 days per week; patient does report staying active at work.  Eating Plan: Concentrate on  diet rich in fresh fruits and vegetables with less processed and high-fat foods.  Alcohol: Advised patient to avoid excess use of alcohol; patient reports rarely drinking.    Start Date of HCV Medication: ~September 23, 2022 (pending PA)  Anticipated/End Date of HCV Medication: ~December 16, 2022    Follow-Up:   October 21, 2022    Jamar John, PharmD    CC:  LEEANN Bucio Kevin, M.D. Jason Dent, IvonneD  Madeleine Can, IvonneD  Lesley Pollard, PharmD  Patient Consult Note    TIME IN: 0906  TIME OUT: 0950

## 2022-09-13 ENCOUNTER — OCCUPATIONAL MEDICINE (OUTPATIENT)
Dept: OCCUPATIONAL MEDICINE | Facility: CLINIC | Age: 62
End: 2022-09-13
Payer: COMMERCIAL

## 2022-09-13 VITALS
HEIGHT: 66 IN | TEMPERATURE: 98.4 F | BODY MASS INDEX: 30.53 KG/M2 | RESPIRATION RATE: 14 BRPM | WEIGHT: 190 LBS | DIASTOLIC BLOOD PRESSURE: 88 MMHG | HEART RATE: 90 BPM | OXYGEN SATURATION: 91 % | SYSTOLIC BLOOD PRESSURE: 130 MMHG

## 2022-09-13 DIAGNOSIS — S39.012D STRAIN OF LUMBAR REGION, SUBSEQUENT ENCOUNTER: ICD-10-CM

## 2022-09-13 PROCEDURE — 99213 OFFICE O/P EST LOW 20 MIN: CPT | Performed by: PREVENTIVE MEDICINE

## 2022-09-13 RX ORDER — VELPATASVIR AND SOFOSBUVIR 100; 400 MG/1; MG/1
1 TABLET, FILM COATED ORAL DAILY
Qty: 28 TABLET | Refills: 2 | Status: SHIPPED | OUTPATIENT
Start: 2022-09-13 | End: 2023-01-25

## 2022-09-13 ASSESSMENT — FIBROSIS 4 INDEX: FIB4 SCORE: 1.14

## 2022-09-13 ASSESSMENT — PAIN SCALES - GENERAL: PAINLEVEL: 5=MODERATE PAIN

## 2022-09-13 NOTE — PROGRESS NOTES
"Subjective:     Hank Vazquez is a 61 y.o. male who presents for Follow-Up (Better -= rm16/)      DOI:  8/8/2022 60 yo injured worker presents with low back pain. 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  Patient states overall symptoms have improved a little bit.  He states he feels about 50% improved from initial injury.  However he does have significant pain with certain movements range of motion.  Range of motion is only improved improved a little bit.  He has attended a few sessions of physical therapy thus far.    ROS    SOCHX: Works as a   FH: No pertinent family history to this problem.       Objective:     /88   Pulse 90   Temp 36.9 °C (98.4 °F) (Temporal)   Resp 14   Ht 1.676 m (5' 6\")   Wt 86.2 kg (190 lb)   SpO2 91%   BMI 30.67 kg/m²     Constitutional: Patient is in no acute distress. Appears well-developed and well-nourished.   Cardiovascular: Normal rate.    Pulmonary/Chest: Effort normal. No respiratory distress.   Neurological: Patient is alert and oriented to person, place, and time.   Skin: Skin is warm and dry.   Psychiatric: Normal mood and affect. Behavior is normal.     Lumbar: No gross deformity.  Area of pain over mostly the right lower lumbar and SI joint.  Range of motion mildly diminished with flexion.  Antalgic gait.    Assessment/Plan:       1. Strain of lumbar region, subsequent encounter  - Referral to Radiology  - MR-LUMBAR SPINE-W/O; Future    Released to Restricted Duty FROM 9/13/2022 TO 10/11/2022       Given continue symptoms will refer for MRI lumbar w/o  Continue physical therapy  Referral for chiropractor treatments, pending approval  OTC ibuprofen/Tylenol as needed  Heat/ice as needed  OTC muscle creams/ointments as needed  Restricted duty  Follow-up 3 weeks       Differential diagnosis, natural history, supportive care, and indications for immediate follow-up discussed.    Approximately 25 minutes was spent in " preparing for visit, obtaining history, exam and evaluation, patient counseling/education and post visit documentation/orders.

## 2022-09-13 NOTE — LETTER
[address],   [address] [place] [address]  Phone:  [phone] - Fax:  [phone]   Occupational Health [facility] Progress Report and Disability Certification  Date of Service: 9/13/2022   No Show:  No  Date / Time of Next Visit: 10/11/2022   Claim Information   Patient Name: Hank Vazquez  Claim Number:     Employer: [employer]  Date of Injury: 8/8/2022     Insurer / TPA: [employer]  ID / SSN:     Occupation:   Diagnosis: The encounter diagnosis was Strain of lumbar region, subsequent encounter.    Medical Information   Related to Industrial Injury? Yes    Subjective Complaints:  DOI:  8/8/2022 60 yo injured worker presents with low back pain. 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  Patient states overall symptoms have improved a little bit.  He states he feels about 50% improved from initial injury.  However he does have significant pain with certain movements range of motion.  Range of motion is only improved improved a little bit.  He has attended a few sessions of physical therapy thus far.   Objective Findings: Lumbar: No gross deformity.  Area of pain over mostly the right lower lumbar and SI joint.  Range of motion mildly diminished with flexion.  Antalgic gait.   Pre-Existing Condition(s):     Assessment:   Condition Same    Status: Additional Care Required  Permanent Disability:No    Plan:      Diagnostics:      Comments:  Given continue symptoms will refer for MRI lumbar w/o  Continue physical therapy  Referral for chiropractor treatments, pending approval  OTC ibuprofen/Tylenol as needed  Heat/ice as needed  OTC muscle creams/ointments as needed  Restricted duty  Follow-up 3 weeks       Disability Information   Status: Released to Restricted Duty    From:  9/13/2022  Through: 10/11/2022 Restrictions are: Temporary   Physical Restrictions   Sitting:    Standing:  < or = to 2 hrs/day Stooping:  < or  = to 1 hr/day Bending:  < or = to 1 hr/day   Squatting:    Walking:  < or = to 2 hrs/day Climbing:    Pushing:  < or = to 2 hrs/day   Pulling:  < or = to 2 hrs/day Other:    Reaching Above Shoulder (L):   Reaching Above Shoulder (R):       Reaching Below Shoulder (L):    Reaching Below Shoulder (R):      Not to exceed Weight Limits   Carrying(hrs):   Weight Limit(lb): < or = to 10 pounds Lifting(hrs):   Weight  Limit(lb): < or = to 10 pounds   Comments:      Repetitive Actions   Hands: i.e. Fine Manipulations from Grasping:     Feet: i.e. Operating Foot Controls:     Driving / Operate Machinery:     Health Care Provider’s Original or Electronic Signature  Franklin Irby D.O. Health Care Provider’s Original or Electronic Signature    Mike Elizabeth MD         Clinic Name / Location: [address],   [address] [place] [address] Clinic Phone Number: Dept: [phone]   Appointment Time: 8:00 Am Visit Start Time: 7:58 AM   Check-In Time:  7:53 Am Visit Discharge Time:  836am   Original-Treating Physician or Chiropractor    Page 2-Insurer/TPA    Page 3-Employer    Page 4-Employee

## 2022-09-23 ENCOUNTER — HOSPITAL ENCOUNTER (OUTPATIENT)
Dept: RADIOLOGY | Facility: MEDICAL CENTER | Age: 62
End: 2022-09-23
Attending: PREVENTIVE MEDICINE
Payer: COMMERCIAL

## 2022-09-23 DIAGNOSIS — S39.012D STRAIN OF LUMBAR REGION, SUBSEQUENT ENCOUNTER: ICD-10-CM

## 2022-09-23 PROCEDURE — 72148 MRI LUMBAR SPINE W/O DYE: CPT

## 2022-10-11 ENCOUNTER — OCCUPATIONAL MEDICINE (OUTPATIENT)
Dept: OCCUPATIONAL MEDICINE | Facility: CLINIC | Age: 62
End: 2022-10-11
Payer: COMMERCIAL

## 2022-10-11 VITALS
RESPIRATION RATE: 16 BRPM | BODY MASS INDEX: 30.53 KG/M2 | HEART RATE: 81 BPM | DIASTOLIC BLOOD PRESSURE: 60 MMHG | WEIGHT: 190 LBS | SYSTOLIC BLOOD PRESSURE: 132 MMHG | TEMPERATURE: 97.3 F | HEIGHT: 66 IN | OXYGEN SATURATION: 92 %

## 2022-10-11 DIAGNOSIS — S39.012D STRAIN OF LUMBAR REGION, SUBSEQUENT ENCOUNTER: ICD-10-CM

## 2022-10-11 PROCEDURE — 99213 OFFICE O/P EST LOW 20 MIN: CPT | Performed by: PREVENTIVE MEDICINE

## 2022-10-11 ASSESSMENT — FIBROSIS 4 INDEX: FIB4 SCORE: 1.14

## 2022-10-11 NOTE — PROGRESS NOTES
"Subjective:     Hank Vazquez is a 61 y.o. male who presents for Follow-Up (FU WC DOI:  8/8/2022: LOWER BACK FEELING BETTER RM 16)       DOI:  8/8/2022 60 yo injured worker presents with low back pain. 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  Patient states overall has had good improvement in symptoms with chiropractor treatments and physical therapy.  He states that he has better range of motion, less pain and more movement overall.  He states he finished all the approved PT and chiropractic treatments would like to do more.  Feels ready to lessen work restrictions.    ROS    SOCHX: Works as a  at [employer]  FH: No pertinent family history to this problem.       Objective:     /60 (BP Location: Right arm, Patient Position: Sitting, BP Cuff Size: Large adult)   Pulse 81   Temp 36.3 °C (97.3 °F) (Temporal)   Resp 16   Ht 1.676 m (5' 6\")   Wt 86.2 kg (190 lb)   SpO2 92%   BMI 30.67 kg/m²     Constitutional: Patient is in no acute distress. Appears well-developed and well-nourished.   Cardiovascular: Normal rate.    Pulmonary/Chest: Effort normal. No respiratory distress.   Neurological: Patient is alert and oriented to person, place, and time.   Skin: Skin is warm and dry.   Psychiatric: Normal mood and affect. Behavior is normal.     Lumbar: No gross deformity.  Area of pain over mostly the right lower lumbar and SI joint.  Essentially normal range of motion.  Slight antalgic gait.    MRI Lumbar: Mild lumbar spine degenerative changes as described above without significant spinal canal stenosis or foraminal narrowing.    Assessment/Plan:       1. Strain of lumbar region, subsequent encounter  - Referral to Physical Therapy  - Referral to Chiropractic    Released to Restricted Duty FROM 10/11/2022 TO 11/8/2022       MRI with mild degenerative disc disease and facet arthritis.  Recommend continue conservative management especially since he is getting good improvement " with current treatment plan  Placed referral for more visits of physical therapy and chiropractic treatments  OTC ibuprofen/Tylenol as needed  Okay to use heat/ice as needed  Okay to use OTC muscle creams/ointments as needed  Restricted duty  Follow-up 4 weeks    Differential diagnosis, natural history, supportive care, and indications for immediate follow-up discussed.    Approximately 25 minutes was spent in preparing for visit, obtaining history, exam and evaluation, patient counseling/education and post visit documentation/orders.

## 2022-10-11 NOTE — LETTER
[address],   [address] [place] [address]  Phone:  [phone] - Fax:  [phone]   Occupational Health [facility] Progress Report and Disability Certification  Date of Service: 10/11/2022   No Show:  No  Date / Time of Next Visit: 11/8/2022@   Claim Information   Patient Name: Hank Vazquez  Claim Number:     Employer: [employer]  Date of Injury: 8/8/2022     Insurer / TPA: [employer]  ID / SSN:     Occupation:   Diagnosis: The encounter diagnosis was Strain of lumbar region, subsequent encounter.    Medical Information   Related to Industrial Injury? Yes    Subjective Complaints:   DOI:  8/8/2022 60 yo injured worker presents with low back pain. 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  Patient states overall has had good improvement in symptoms with chiropractor treatments and physical therapy.  He states that he has better range of motion, less pain and more movement overall.  He states he finished all the approved PT and chiropractic treatments would like to do more.  Feels ready to lessen work restrictions.   Objective Findings: Lumbar: No gross deformity.  Area of pain over mostly the right lower lumbar and SI joint.  Essentially normal range of motion.  Slight antalgic gait.    MRI Lumbar: Mild lumbar spine degenerative changes as described above without significant spinal canal stenosis or foraminal narrowing.   Pre-Existing Condition(s):     Assessment:   Condition Improved    Status: Additional Care Required  Permanent Disability:No    Plan:      Diagnostics:      Comments:  MRI with mild degenerative disc disease and facet arthritis.  Recommend continue conservative management especially since he is getting good improvement with current treatment plan  Placed referral for more visits of physical therapy and chiropractic treatments  OTC ibuprofen/Tylenol as needed  Okay to use heat/ice as needed  Okay  to use OTC muscle creams/ointments as needed  Restricted duty  Follow-up 4 weeks    Disability Information   Status: Released to Restricted Duty    From:  10/11/2022  Through: 11/8/2022 Restrictions are: Temporary   Physical Restrictions   Sitting:    Standing:  < or = to 6 hrs/day Stooping:    Bending:      Squatting:    Walking:  < or = to 6 hrs/day Climbing:    Pushing:      Pulling:    Other:    Reaching Above Shoulder (L):   Reaching Above Shoulder (R):       Reaching Below Shoulder (L):    Reaching Below Shoulder (R):      Not to exceed Weight Limits   Carrying(hrs):   Weight Limit(lb): < or = to 25 pounds Lifting(hrs):   Weight  Limit(lb): < or = to 25 pounds   Comments:      Repetitive Actions   Hands: i.e. Fine Manipulations from Grasping:     Feet: i.e. Operating Foot Controls:     Driving / Operate Machinery:     Health Care Provider’s Original or Electronic Signature  Franklin Irby D.O. Health Care Provider’s Original or Electronic Signature    Mike Elizabeth MD         Clinic Name / Location: [address] [address] Clinic Phone Number: Dept: [phone]   Appointment Time: 8:00 Am Visit Start Time: 8:04 AM   Check-In Time:  7:58 Am Visit Discharge Time:  8:24am   Original-Treating Physician or Chiropractor    Page 2-Insurer/TPA    Page 3-Employer    Page 4-Employee

## 2022-10-19 ENCOUNTER — HOSPITAL ENCOUNTER (OUTPATIENT)
Dept: LAB | Facility: MEDICAL CENTER | Age: 62
End: 2022-10-19
Attending: NURSE PRACTITIONER
Payer: COMMERCIAL

## 2022-10-19 ENCOUNTER — HOSPITAL ENCOUNTER (OUTPATIENT)
Facility: MEDICAL CENTER | Age: 62
End: 2022-10-19
Attending: NURSE PRACTITIONER
Payer: COMMERCIAL

## 2022-10-19 DIAGNOSIS — I10 ESSENTIAL HYPERTENSION: ICD-10-CM

## 2022-10-19 LAB
BASOPHILS # BLD AUTO: 1.1 % (ref 0–1.8)
BASOPHILS # BLD: 0.07 K/UL (ref 0–0.12)
EOSINOPHIL # BLD AUTO: 0.41 K/UL (ref 0–0.51)
EOSINOPHIL NFR BLD: 6.5 % (ref 0–6.9)
ERYTHROCYTE [DISTWIDTH] IN BLOOD BY AUTOMATED COUNT: 43.3 FL (ref 35.9–50)
HCT VFR BLD AUTO: 49.5 % (ref 42–52)
HGB BLD-MCNC: 16.4 G/DL (ref 14–18)
IMM GRANULOCYTES # BLD AUTO: 0.03 K/UL (ref 0–0.11)
IMM GRANULOCYTES NFR BLD AUTO: 0.5 % (ref 0–0.9)
LYMPHOCYTES # BLD AUTO: 1.22 K/UL (ref 1–4.8)
LYMPHOCYTES NFR BLD: 19.5 % (ref 22–41)
MCH RBC QN AUTO: 30 PG (ref 27–33)
MCHC RBC AUTO-ENTMCNC: 33.1 G/DL (ref 33.7–35.3)
MCV RBC AUTO: 90.5 FL (ref 81.4–97.8)
MONOCYTES # BLD AUTO: 0.59 K/UL (ref 0–0.85)
MONOCYTES NFR BLD AUTO: 9.4 % (ref 0–13.4)
NEUTROPHILS # BLD AUTO: 3.94 K/UL (ref 1.82–7.42)
NEUTROPHILS NFR BLD: 63 % (ref 44–72)
NRBC # BLD AUTO: 0 K/UL
NRBC BLD-RTO: 0 /100 WBC
PLATELET # BLD AUTO: 193 K/UL (ref 164–446)
PMV BLD AUTO: 10.8 FL (ref 9–12.9)
RBC # BLD AUTO: 5.47 M/UL (ref 4.7–6.1)
WBC # BLD AUTO: 6.3 K/UL (ref 4.8–10.8)

## 2022-10-19 PROCEDURE — 80061 LIPID PANEL: CPT

## 2022-10-19 PROCEDURE — 83036 HEMOGLOBIN GLYCOSYLATED A1C: CPT

## 2022-10-19 PROCEDURE — 85025 COMPLETE CBC W/AUTO DIFF WBC: CPT

## 2022-10-19 PROCEDURE — 36415 COLL VENOUS BLD VENIPUNCTURE: CPT

## 2022-10-19 PROCEDURE — 84153 ASSAY OF PSA TOTAL: CPT

## 2022-10-20 DIAGNOSIS — E11.65 TYPE 2 DIABETES MELLITUS WITH HYPERGLYCEMIA, WITHOUT LONG-TERM CURRENT USE OF INSULIN (HCC): ICD-10-CM

## 2022-10-20 DIAGNOSIS — Z12.5 PROSTATE CANCER SCREENING: ICD-10-CM

## 2022-10-20 LAB
CHOLEST SERPL-MCNC: 157 MG/DL (ref 100–199)
EST. AVERAGE GLUCOSE BLD GHB EST-MCNC: 148 MG/DL
FASTING STATUS PATIENT QL REPORTED: NORMAL
HBA1C MFR BLD: 6.8 % (ref 4–5.6)
HDLC SERPL-MCNC: 31 MG/DL
LDLC SERPL CALC-MCNC: 97 MG/DL
PSA SERPL-MCNC: 0.25 NG/ML (ref 0–4)
TRIGL SERPL-MCNC: 143 MG/DL (ref 0–149)

## 2022-10-20 NOTE — TELEPHONE ENCOUNTER
Received request via: Pharmacy    Was the patient seen in the last year in this department? Yes    Does the patient have an active prescription (recently filled or refills available) for medication(s) requested? No    07/14/2022  Last office visit:  Last labs:10/19/2022

## 2022-10-21 ENCOUNTER — APPOINTMENT (OUTPATIENT)
Dept: VASCULAR LAB | Facility: MEDICAL CENTER | Age: 62
End: 2022-10-21
Attending: INTERNAL MEDICINE
Payer: COMMERCIAL

## 2022-10-21 PROCEDURE — RXMED WILLOW AMBULATORY MEDICATION CHARGE: Performed by: INTERNAL MEDICINE

## 2022-10-24 ENCOUNTER — PHARMACY VISIT (OUTPATIENT)
Dept: PHARMACY | Facility: MEDICAL CENTER | Age: 62
End: 2022-10-24
Payer: COMMERCIAL

## 2022-10-24 ENCOUNTER — DOCUMENTATION (OUTPATIENT)
Dept: PHARMACY | Facility: MEDICAL CENTER | Age: 62
End: 2022-10-24
Payer: COMMERCIAL

## 2022-10-24 NOTE — PROGRESS NOTES
PHARMACIST PRE SCREEN - **New Onboarding**     List Drug Allergies: NKDA  List Non-Drug Allergies: NKNDA  List ICD-10: B18.2  List Diagnosis: Chronic Hepatitis C, 2b  List Goals of Therapy:   Achieve SVR with suppression of HCV RNA    Prevent disease progression to cirrhosis, ESLD, and HCC   Eliminate common drug interactions and support adherence to therapy for optimal treatment success   Decrease transmission of HCV through successful cure, education/counseling, and harm reduction strategies     Drug Therapy (name/formulation/dose/route of admin/freq): generic Epclusa 400-100mg tab, 1 tab PO every day at 6PM x 12 weeks    Appropriateness Review (Y/N + If being prescribed off label, notate supporting reference): Y, no decompensated cirrhosis noted     ? Dose appropriateness (Y/N + BSA, height/weight if applicable): Y, not weight based    ? Any Renal/Hepatic adjustments needed (Y/N + explain)? N, no renal or hepatic dose adjustments    ? Comorbidity and Past Medical History reviewed in EMR. Any comorbidities, PMH, precautions, or contraindications that pose medication safety concern (Y/N + document and reach out to provider if appropriate)? N    ? Any relevant lab work needed that affect the initial start date (Y/N + document and reach out to provider if appropriate)? N    EMR med list reviewed. List DDI’s:    ? Cat D: glimepiride + Empagliflozin = increased risk of hypoglycemia, monitor BG    ? Cat C: Epclusa may increase Atorvastatin, monitor for muscle aches    ? Cat C: Epclusa may enhance hypoglycemic effects of antidiabetic agents, monitor BG    Patient’s ability to self-administer medication: No issues identified per EMR         PHARMACIST NEW START - HCV Call Review 10/24/22  Dx (Genotype/#yrs infected): Chronic Hepatitis C, 2b, # of years infected unknown, possible exposure 15-20 years ago with IVDU  Fibrosis/cirrhosis: no FibroSure score but FIB-4 score: 0.51, low risk for advanced fibrosis   Child Patel  Score (if applicable): likely class A per MD notes  Coinfections: none    Tx prescribed: generic Epclusa 400-100mg tab, 1 tab PO every day at 6PM    Duration of therapy: 12 weeks    Past HCV Txt: none  Med Rec/Updated drug list: deferred- reviewed with ID [facility] 9/9/22 and says nothing has changed  Common DI Avoidance: no antacids, limit Tylenol to 3gms/day, call before starting any new meds to confirm no DDIs  DI Check:  based on EMR med list   Cat D: glimepiride + Empagliflozin = increased risk of hypoglycemia, monitor BG   Cat C: Epclusa may increase Atorvastatin, monitor for muscle aches   Cat C: Epclusa may enhance hypoglycemic effects of antidiabetic agents, monitor BG    Goals of Therapy:   Achieve SVR with suppression of HCV RNA    Prevent disease progression to cirrhosis, ESLD, and HCC   Eliminate common drug interactions and support adherence to therapy for optimal treatment success   Decrease transmission of HCV through successful cure, education/counseling, and harm reduction strategies  Patient has agreed to/understands goals of therapy during education/counseling     S/Sx(Baseline): none  Labs 10/19/22    CBC: WBC: 6.3   Hgb: 16i.4    Hct: 49.5    Plt: 193  INR: 1.01 (6/16/22)    LFTs:  AST: 27   ALT: 45   Bili: 0.8  Alb: 4.2   ALP: 54 (7/7/22)    BP/HR: 127/75, 64 (9/9/22)    HCV RNA: 55,344,584 (3/22/22)  Resistance Testing: n/a  HBV status (serologies and immunity): HBsAb: < 3.5, HBsAg: non-reactive, HB Core Ab: non-reactive (6/16/22), HAV Ab: nonreactive, not immune to HBV or HAV- messaged MD   Past HBV Txt: none      Admin/Schedule: generic Epclusa 400-100mg tab, 1 tab PO every day at 6PM, can take with or without food but be consistent, same time every day/picked up med today and planning on starting today  Adherence: will use phone alarm for reminder     Low risk for non-adherence based on adherence predictor tool score: 0, understands importance, $5 copay    Missed dose mgmt:  asap >12hrs then  skip and resume normal, don't dbl up, call if not sure    Barriers (if exist): none      New:  List Common SE Reviewed: fatigue, headache - says he already gets headaches and takes Tylenol if needed, said he can take Tylenol but limit to 3gms/day  List Serious SE Reviewed/Precautions: HBV reactivation, no antigen or antibodies, discussed increased risk of hypoglycemia with antidiabetic agents and to monitor blood sugars    Proper Handling/Storage/Excursion/Disposal: RT, dry out of bathroom, safe place away from kids and pets    Social Hx: IVDU history, used to smoke and drink  Risk Reduction/Prevention Counseling: advised to change out razors, nail clippers, and tooth brushes mid-therapy and end of therapy to decrease reinfection risk, HCV can live on surfaces for 3 weeks  Wellness/Lifestyle (ETOH): says he doesn't drink alcohol anymore, said that's great and advised to continue to abstain since it can be hard  on liver  Vaccines: not immune to HBV and HAV based on labs, he didn't think he had either vaccine and nothing noted in EMR- messaged MD   ADLs: none    Additional:  Had a pleasant conversation with patient regarding Epclusa. ID pharmacist reviewed medication with him as well in early September but since it has been a while reviewed the medication again. Stressed importance of adherence and finishing 12 week course of therapy. Reviewed dosing, administration, miss doses, storage, and side effects. He said he doesn't drink alcohol or smoke anymore. His HAV and HBV labs showed that he's not immune, but no notes of vaccines given and he didn't think that he had had these. Messaged MD to see if he wants him to get these vaccines for protection and let patient know team will reach out if needed. Med review deferred, said ID pharmacist reviewed. Advised to always call before starting a new medication to confirm no drug interactions. He picked up Epclusa today and is planning on starting today. Encouraged him to  call with any questions, he was appreciative of call. Will follow up next week for tolerability check.     Start: 10/24/22  Mid-Therapy: 12/5/22  End of Therapy: 1/16/23  SVR12: 4/10/23  SVR24: 7/3/23

## 2022-10-31 ENCOUNTER — DOCUMENTATION (OUTPATIENT)
Dept: PHARMACY | Facility: MEDICAL CENTER | Age: 62
End: 2022-10-31
Payer: COMMERCIAL

## 2022-10-31 DIAGNOSIS — E11.65 TYPE 2 DIABETES MELLITUS WITH HYPERGLYCEMIA, WITHOUT LONG-TERM CURRENT USE OF INSULIN (HCC): ICD-10-CM

## 2022-11-01 RX ORDER — METFORMIN HYDROCHLORIDE 500 MG/1
1000 TABLET, EXTENDED RELEASE ORAL DAILY
Qty: 180 TABLET | Refills: 3 | Status: SHIPPED | OUTPATIENT
Start: 2022-11-01 | End: 2022-12-19 | Stop reason: SDUPTHER

## 2022-11-01 RX ORDER — LISINOPRIL 2.5 MG/1
2.5 TABLET ORAL DAILY
Qty: 90 TABLET | Refills: 1 | Status: SHIPPED | OUTPATIENT
Start: 2022-11-01 | End: 2022-12-19 | Stop reason: SDUPTHER

## 2022-11-01 RX ORDER — GLIMEPIRIDE 4 MG/1
4 TABLET ORAL EVERY MORNING
Qty: 90 TABLET | Refills: 2 | Status: SHIPPED | OUTPATIENT
Start: 2022-11-01 | End: 2022-12-19 | Stop reason: SDUPTHER

## 2022-11-01 NOTE — TELEPHONE ENCOUNTER
Received request via: Patient    Was the patient seen in the last year in this department? Yes    Does the patient have an active prescription (recently filled or refills available) for medication(s) requested? No    Last office Visit:07/14/2022  Last labs:10/20/2022

## 2022-11-02 NOTE — PROGRESS NOTES
PHARMACIST FOLLOW UP - **First Cycle**  Confirmed Start Date:  10/24/22     Txt review (med/dose/freq/cycle): Epclusa 400-100mg daily x 12 weeks  Med Rec/Updated drug list: No changes since last assessment, reviewed with patient/caregiver.  New changes related to health (allergies, health conditions):  none  Unplanned medical visits (ER, Hospitalization): none     Admin tips/Adherence:  Epclusa 400-100mg tab, 1 tab PO every day at 6PM  Current SE: none    Mgt: n/a  ADLs: no days missed  Review from initial:  Advised to avoid use of OTC acid suppressants/antacids without consulting clinic/pharmacy first. Failure to do so can compromise effectiveness of HCV therapy. Reviewed to not start any new meds/herbs/OTCs/supplements without speaking to clinic/pharmacist to check for DDIs. He was agreeable to information and confirmed he does not take any OTC stomach antacids.  Additional: Had a brief talk with Hank who shared things have been going fine so far on Epclusa. He confirmed no missed doses and received notification of upcoming lab/appt dates. Aware these are to monitor progress only and  thankful for our support; agreeable to mid-therapy check-in.

## 2022-11-08 ENCOUNTER — OCCUPATIONAL MEDICINE (OUTPATIENT)
Dept: OCCUPATIONAL MEDICINE | Facility: CLINIC | Age: 62
End: 2022-11-08
Payer: COMMERCIAL

## 2022-11-08 DIAGNOSIS — S39.012D STRAIN OF LUMBAR REGION, SUBSEQUENT ENCOUNTER: ICD-10-CM

## 2022-11-08 PROCEDURE — 99213 OFFICE O/P EST LOW 20 MIN: CPT | Performed by: PREVENTIVE MEDICINE

## 2022-11-08 ASSESSMENT — ENCOUNTER SYMPTOMS
TINGLING: 0
SENSORY CHANGE: 0

## 2022-11-08 NOTE — LETTER
[address],   [address] [place] [address]  Phone:  [phone] - Fax:  [phone]   Occupational Health [facility] Progress Report and Disability Certification  Date of Service: 11/8/2022   No Show:  No  Date / Time of Next Visit: 12/5/2022   Claim Information   Patient Name: Hank Vazquez  Claim Number:     Employer: [employer]  Date of Injury: 8/8/2022     Insurer / TPA: [employer]  ID / SSN:     Occupation:   Diagnosis: The encounter diagnosis was Strain of lumbar region, subsequent encounter.    Medical Information   Related to Industrial Injury? Yes    Subjective Complaints:  DOI:  8/8/2022 62 yo injured worker presents with low back pain. 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  Patient states has had a little bit of improvement since last visit but continues with significant pain with lower back, more so on the right side.  He states with being any 1 position for too long does increase pain.  He states he was able to restart physical therapy but has not received any approval for more chiropractic visits yet.   Objective Findings: Lumbar: No gross deformity.  Area of pain over mostly the right lower lumbar and SI joint.  Essentially normal range of motion.  Slight antalgic gait.     MRI Lumbar: Mild lumbar spine degenerative changes as described above without significant spinal canal stenosis or foraminal narrowing.   Pre-Existing Condition(s):     Assessment:   Condition Same    Status: Additional Care Required  Permanent Disability:No    Plan:      Diagnostics:      Comments:  Referral to physiatry given continued symptoms  Continue physical therapy  Referral to chiropractic treatments, pending approval  OTC ibuprofen/Tylenol as needed  Okay to use heat/ice as needed  Okay to use OTC muscle creams/ointments as needed  Restricted duty  Follow-up 4 weeks    Disability Information   Status: Released to  Restricted Duty    From:  11/8/2022  Through: 12/5/2022 Restrictions are: Temporary   Physical Restrictions   Sitting:    Standing:  < or = to 6 hrs/day Stooping:    Bending:      Squatting:    Walking:  < or = to 6 hrs/day Climbing:    Pushing:      Pulling:    Other:    Reaching Above Shoulder (L):   Reaching Above Shoulder (R):       Reaching Below Shoulder (L):    Reaching Below Shoulder (R):      Not to exceed Weight Limits   Carrying(hrs):   Weight Limit(lb): < or = to 25 pounds Lifting(hrs):   Weight  Limit(lb): < or = to 25 pounds   Comments:      Repetitive Actions   Hands: i.e. Fine Manipulations from Grasping:     Feet: i.e. Operating Foot Controls:     Driving / Operate Machinery:     Health Care Provider’s Original or Electronic Signature  Franklin Irby D.O. Health Care Provider’s Original or Electronic Signature    Mike Elizabeth MD         Clinic Name / Location: [address] [address] Clinic Phone Number: Dept: [phone]   Appointment Time: 8:00 Am Visit Start Time: 8:11 AM   Check-In Time:  8:00 Am Visit Discharge Time:  8:27AM   Original-Treating Physician or Chiropractor    Page 2-Insurer/TPA    Page 3-Employer    Page 4-Employee

## 2022-11-08 NOTE — PROGRESS NOTES
Subjective:     Hank Vazquez is a 61 y.o. male who presents for Follow-Up      DOI:  8/8/2022 62 yo injured worker presents with low back pain. 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  Patient states has had a little bit of improvement since last visit but continues with significant pain with lower back, more so on the right side.  He states with being any 1 position for too long does increase pain.  He states he was able to restart physical therapy but has not received any approval for more chiropractic visits yet.    Review of Systems   Neurological:  Negative for tingling and sensory change.     SOCHX: Works as a  at [employer]  FH: No pertinent family history to this problem.       Objective:     There were no vitals taken for this visit.    Constitutional: Patient is in no acute distress. Appears well-developed and well-nourished.   Cardiovascular: Normal rate.    Pulmonary/Chest: Effort normal. No respiratory distress.   Neurological: Patient is alert and oriented to person, place, and time.   Skin: Skin is warm and dry.   Psychiatric: Normal mood and affect. Behavior is normal.     Lumbar: No gross deformity.  Area of pain over mostly the right lower lumbar and SI joint.  Essentially normal range of motion.  Slight antalgic gait.     MRI Lumbar: Mild lumbar spine degenerative changes as described above without significant spinal canal stenosis or foraminal narrowing.    Assessment/Plan:       1. Strain of lumbar region, subsequent encounter  - Referral to Pain Clinic    Released to Restricted Duty FROM 11/8/2022 TO 12/5/2022       Referral to physiatry given continued symptoms  Continue physical therapy  Referral to chiropractic treatments, pending approval  OTC ibuprofen/Tylenol as needed  Okay to use heat/ice as needed  Okay to use OTC muscle creams/ointments as needed  Restricted duty  Follow-up 4 weeks    Differential diagnosis, natural history, supportive  care, and indications for immediate follow-up discussed.    Approximately 25 minutes was spent in preparing for visit, obtaining history, exam and evaluation, patient counseling/education and post visit documentation/orders.

## 2022-11-28 PROCEDURE — RXMED WILLOW AMBULATORY MEDICATION CHARGE: Performed by: INTERNAL MEDICINE

## 2022-11-30 ENCOUNTER — PHARMACY VISIT (OUTPATIENT)
Dept: PHARMACY | Facility: MEDICAL CENTER | Age: 62
End: 2022-11-30
Payer: COMMERCIAL

## 2022-12-05 ENCOUNTER — DOCUMENTATION (OUTPATIENT)
Dept: PHARMACY | Facility: MEDICAL CENTER | Age: 62
End: 2022-12-05
Payer: COMMERCIAL

## 2022-12-05 ENCOUNTER — OCCUPATIONAL MEDICINE (OUTPATIENT)
Dept: OCCUPATIONAL MEDICINE | Facility: CLINIC | Age: 62
End: 2022-12-05
Payer: COMMERCIAL

## 2022-12-05 VITALS
HEART RATE: 72 BPM | SYSTOLIC BLOOD PRESSURE: 136 MMHG | OXYGEN SATURATION: 91 % | WEIGHT: 190 LBS | RESPIRATION RATE: 18 BRPM | DIASTOLIC BLOOD PRESSURE: 78 MMHG | HEIGHT: 66 IN | BODY MASS INDEX: 30.53 KG/M2

## 2022-12-05 DIAGNOSIS — S39.012D STRAIN OF LUMBAR REGION, SUBSEQUENT ENCOUNTER: ICD-10-CM

## 2022-12-05 PROCEDURE — 99213 OFFICE O/P EST LOW 20 MIN: CPT | Performed by: PREVENTIVE MEDICINE

## 2022-12-05 ASSESSMENT — FIBROSIS 4 INDEX: FIB4 SCORE: 1.25

## 2022-12-05 NOTE — PROGRESS NOTES
"Subjective:     Hank Vazquez is a 61 y.o. male who presents for Follow-Up (WC DOI 8/8/22 back, same, rm 16)      DOI:  8/8/2022 60 yo injured worker presents with low back pain. 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  Patient states overall symptoms are the same.  Continues to have pain over the lower lumbar into the right side.  He states he starts chiropractor treatments today.  He states he has his spine consult next week.  He is working light duty.  He wears a back brace while at work and at other times as well.    ROS         Objective:     /78   Pulse 72   Resp 18   Ht 1.676 m (5' 6\")   Wt 86.2 kg (190 lb)   SpO2 91%   BMI 30.67 kg/m²     Constitutional: Patient is in no acute distress. Appears well-developed and well-nourished.   Cardiovascular: Normal rate.    Pulmonary/Chest: Effort normal. No respiratory distress.   Neurological: Patient is alert and oriented to person, place, and time.   Skin: Skin is warm and dry.   Psychiatric: Normal mood and affect. Behavior is normal.     Lumbar: No gross deformity.  Area of pain over mostly the right lower lumbar and SI joint.  Essentially normal range of motion.  Slight antalgic gait.     MRI Lumbar: Mild lumbar spine degenerative changes as described above without significant spinal canal stenosis or foraminal narrowing.    Assessment/Plan:       1. Strain of lumbar region, subsequent encounter    Released to Restricted Duty FROM 12/5/2022 TO         Referral to physiatry, approved and scheduled for next week  Referral to chiropractic treatments, scheduled 1st for today  OTC ibuprofen/Tylenol as needed  Okay to use heat/ice as needed  Okay to use OTC muscle creams/ointments as needed  Restricted duty  Follow-up as needed    Differential diagnosis, natural history, supportive care, and indications for immediate follow-up discussed.    Approximately 25 minutes was spent in preparing for visit, obtaining history, exam and " evaluation, patient counseling/education and post visit documentation/orders.

## 2022-12-05 NOTE — LETTER
[address],   [address] [place] [address]  Phone:  [phone] - Fax:  [phone]   [facility] Progress Report and Disability Certification  Date of Service: 12/5/2022   No Show:  No  Date / Time of Next Visit:  CELESTINO   Claim Information   Patient Name: Hank Vazquez  Claim Number:     Employer: [employer]  Date of Injury: 8/8/2022     Insurer / TPA: [employer]  ID / SSN:     Occupation:   Diagnosis: The encounter diagnosis was Strain of lumbar region, subsequent encounter.    Medical Information   Related to Industrial Injury? Yes    Subjective Complaints:  DOI:  8/8/2022 62 yo injured worker presents with low back pain. 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  Patient states overall symptoms are the same.  Continues to have pain over the lower lumbar into the right side.  He states he starts chiropractor treatments today.  He states he has his spine consult next week.  He is working light duty.  He wears a back brace while at work and at other times as well.   Objective Findings: Lumbar: No gross deformity.  Area of pain over mostly the right lower lumbar and SI joint.  Essentially normal range of motion.  Slight antalgic gait.     MRI Lumbar: Mild lumbar spine degenerative changes as described above without significant spinal canal stenosis or foraminal narrowing.   Pre-Existing Condition(s):     Assessment:   Condition Same    Status: Discharged / Care Transfer  Permanent Disability:No    Plan:      Diagnostics:      Comments:  Referral to physiatry, approved and scheduled for next week  Referral to chiropractic treatments, scheduled 1st for today  OTC ibuprofen/Tylenol as needed  Okay to use heat/ice as needed  Okay to use OTC muscle creams/ointments as needed  Restricted duty  Follow-up as needed    Disability Information   Status: Released to Restricted Duty    From:   12/5/2022  Through:   Restrictions are: Temporary   Physical Restrictions   Sitting:    Standing:    Stooping:    Bending:      Squatting:    Walking:    Climbing:    Pushing:      Pulling:    Other:    Reaching Above Shoulder (L):   Reaching Above Shoulder (R):       Reaching Below Shoulder (L):    Reaching Below Shoulder (R):      Not to exceed Weight Limits   Carrying(hrs):   Weight Limit(lb):   Lifting(hrs):   Weight  Limit(lb):     Comments:      Repetitive Actions   Hands: i.e. Fine Manipulations from Grasping:     Feet: i.e. Operating Foot Controls:     Driving / Operate Machinery:     Health Care Provider’s Original or Electronic Signature  Franklin Irby D.O. Health Care Provider’s Original or Electronic Signature    Franklin Irby DO MPH     Clinic Name / Location: [address],   [place] [address]  [place] [address] Clinic Phone Number: Dept: [phone]   Appointment Time: 8:00 Am Visit Start Time: 8:11 AM   Check-In Time:  7:47 Am Visit Discharge Time:  8:32AM   Original-Treating Physician or Chiropractor    Page 2-Insurer/TPA    Page 3-Employer    Page 4-Employee

## 2022-12-06 NOTE — PROGRESS NOTES
PHARMACIST FOLLOW UP - Follow Up Assessment   Dx: HCV      List Changes to Allergies, Diagnoses:  none   Current S/Sx: none  Clinically Relevant, Abnormal Labs:  no recent labs, due for FUV   Tx prescribed:   Epclusa 400/100mg tab, 1 tab po daily x 12 weeks.   - Administration:  part of daily routine       Adherence: missed 1 week of med d/t insurance price increase   - Missed dose mgmt:  no missed doses otherwise, review n/a.       Current SE: none   - Mitigation/Mgmt: n/a      Wellness/Lifestyle Counseling:    - Support/QOL: no impact on ADLs   - Immunizations: deferred**   - HCV transmission review:  had questions today if his wife should be tested for HCV. I had advised if she's never been tested in the past it would be good to be tested once. Reviewed transmission of HCV via blood and confirmed they do not share any personal grooming tools. Advised to discard and replace any grooming tools that can draw blood (ie. toothbrush, nail clippers, razors, etc.) at the end of therapy. To continue to keep them segregated from others to prevent additional transmission.   Med Rec/Updated drug list: EMR accurate, no medication list inaccuracies. Reviewed with patient/caregiver.   DI Check:   no new/clinically significant DDIs. Denies any s/sx of hypoglycemia and demoed understanding of how to manage with soda/candy.        Goals of Therapy:     Achieve SVR with suppression of HCV RNA     Prevent disease progression to cirrhosis, ESLD, and HCC    Eliminate common drug interactions and support adherence to therapy for optimal treatment success    Decrease transmission of HCV through successful cure, education/counseling, and harm reduction strategies  Progression toward goals - no missed does on his own accord; reports tolerability to therapy. Too soon to determine if therapy effective  - patient due for updated labs.     Patient has agreed/understands to goals of therapy during education/counseling         Additional: Had a  nice talk with Hank who continues to tolerate Epclusa without issue. Reminded him of SVR labs after medication completed to further assess for cure 12 to 24 weeks after last dose. He thanked me for the continued support, no questions/concerns today.

## 2022-12-19 ENCOUNTER — OFFICE VISIT (OUTPATIENT)
Dept: MEDICAL GROUP | Facility: PHYSICIAN GROUP | Age: 62
End: 2022-12-19
Payer: COMMERCIAL

## 2022-12-19 VITALS
HEART RATE: 70 BPM | RESPIRATION RATE: 14 BRPM | HEIGHT: 66 IN | TEMPERATURE: 98.2 F | SYSTOLIC BLOOD PRESSURE: 118 MMHG | WEIGHT: 195.6 LBS | DIASTOLIC BLOOD PRESSURE: 62 MMHG | BODY MASS INDEX: 31.43 KG/M2 | OXYGEN SATURATION: 97 %

## 2022-12-19 DIAGNOSIS — Z23 NEED FOR VACCINATION: ICD-10-CM

## 2022-12-19 DIAGNOSIS — Z12.11 COLON CANCER SCREENING: ICD-10-CM

## 2022-12-19 DIAGNOSIS — E66.9 OBESITY (BMI 30-39.9): ICD-10-CM

## 2022-12-19 DIAGNOSIS — I10 ESSENTIAL HYPERTENSION: ICD-10-CM

## 2022-12-19 DIAGNOSIS — E11.65 TYPE 2 DIABETES MELLITUS WITH HYPERGLYCEMIA, WITHOUT LONG-TERM CURRENT USE OF INSULIN (HCC): ICD-10-CM

## 2022-12-19 PROCEDURE — RXMED WILLOW AMBULATORY MEDICATION CHARGE: Performed by: INTERNAL MEDICINE

## 2022-12-19 PROCEDURE — 99214 OFFICE O/P EST MOD 30 MIN: CPT | Mod: 25 | Performed by: NURSE PRACTITIONER

## 2022-12-19 PROCEDURE — 90471 IMMUNIZATION ADMIN: CPT | Performed by: NURSE PRACTITIONER

## 2022-12-19 PROCEDURE — 90686 IIV4 VACC NO PRSV 0.5 ML IM: CPT | Performed by: NURSE PRACTITIONER

## 2022-12-19 RX ORDER — METFORMIN HYDROCHLORIDE 500 MG/1
TABLET, EXTENDED RELEASE ORAL
Qty: 190 TABLET | Refills: 3 | Status: SHIPPED | OUTPATIENT
Start: 2022-12-19 | End: 2023-08-02 | Stop reason: SDUPTHER

## 2022-12-19 RX ORDER — LISINOPRIL 2.5 MG/1
2.5 TABLET ORAL DAILY
Qty: 90 TABLET | Refills: 3 | Status: SHIPPED | OUTPATIENT
Start: 2022-12-19 | End: 2023-08-02 | Stop reason: SDUPTHER

## 2022-12-19 RX ORDER — GLIMEPIRIDE 4 MG/1
4 TABLET ORAL EVERY MORNING
Qty: 90 TABLET | Refills: 2 | Status: SHIPPED | OUTPATIENT
Start: 2022-12-19 | End: 2023-08-02 | Stop reason: SDUPTHER

## 2022-12-19 RX ORDER — ATORVASTATIN CALCIUM 20 MG/1
20 TABLET, FILM COATED ORAL EVERY EVENING
Qty: 90 TABLET | Refills: 3 | Status: SHIPPED | OUTPATIENT
Start: 2022-12-19 | End: 2023-08-02 | Stop reason: SDUPTHER

## 2022-12-19 ASSESSMENT — FIBROSIS 4 INDEX: FIB4 SCORE: 1.27

## 2022-12-19 NOTE — PROGRESS NOTES
"Chief Complaint   Patient presents with    Diabetes Follow-up     Pt is here to follow up on DM brought record with him        Subjective:     HPI:   Hank Vazquez is a 62 y.o. male here to discuss the evaluation and management of:      Type 2 diabetes mellitus (HCC)  Chronic well-controlled condition.  Last labs from October indicate A1c level at 6.8%.  Patient denies any hyper or hypoglycemic events.  Reviewed blood sugar log from patient which does not show any hyper or hypoglycemic numbers.  Patient is currently on Jardiance 25 mg daily, glimepiride 4 mg in the a.m., and metformin 500 mg in the a.m. and 1000 mg at night.  Patient reports he is tolerating his medications well without any adverse effects.  Appropriately on lisinopril and atorvastatin.  Patient is due for monofilament exam.    Obesity (BMI 30-39.9)   10/11/2022  8:07 AM 12/5/2022  8:11 AM 12/19/2022  8:18 AM   Vitals      Weight 190  190  195.6    Height 1.676 m (5' 6\")  1.676 m (5' 6\")  1.676 m (5' 6\")    BMI 30.67 kg/m2  30.67 kg/m2  31.57 kg/m2        Chronic health concern.  Patient does not identify that he is overweight.  He is working on cutting out ice cream.  Overall weight is stable, patient does have heavy boots on today and did not take them off prior to waiting.    Essential hypertension  Blood pressure well controlled today in clinic.  Patient is currently on lisinopril 12.5 mg daily.  Patient is up-to-date with labs.    ROS:  Gen: no fevers/chills, no changes in weight  Pulm: no sob, no cough  CV: no chest pain, no palpitations  GI: no nausea/vomiting, no diarrhea    No Known Allergies    Current medicines (including changes today)  Current Outpatient Medications   Medication Sig Dispense Refill    metFORMIN ER (GLUCOPHAGE XR) 500 MG TABLET SR 24 HR take 500 mg in the morning and 1000 mg at night. 190 Tablet 3    atorvastatin (LIPITOR) 20 MG Tab Take 1 Tablet by mouth every evening. 90 Tablet 3    glimepiride (AMARYL) 4 MG Tab " "Take 1 Tablet by mouth every morning. TAKE 1 TABLET BY MOUTH DAILY 90 Tablet 2    Empagliflozin 25 MG Tab Take 25 mg by mouth every day. 90 Tablet 0    lisinopril (PRINIVIL) 2.5 MG Tab Take 1 Tablet by mouth every day. 90 Tablet 3    Sofosbuvir-Velpatasvir (EPCLUSA) 400-100 MG Tab Take 1 Tablet by mouth every day at 6 PM for 84 days. 28 Tablet 2    glucose blood (COOL BLOOD GLUCOSE TEST STRIPS) strip 1 Strip by Other route every day. 100 Strip 3    Blood Glucose Monitoring Suppl Device Meter: Dispense Device of Insurance Preference. Sig. Use as directed for blood sugar monitoring. #1. NR. 1 Each 0    Fenugreek 610 MG Cap Take 610 mg by mouth every day.      Lancets Lancets order: Lancets for insurance approved meter. Sig: use once daily for high or low sugar. #100 RF x 3 200 Each 2     No current facility-administered medications for this visit.          Objective:       /62   Pulse 70   Temp 36.8 °C (98.2 °F) (Temporal)   Resp 14   Ht 1.676 m (5' 6\")   Wt 88.7 kg (195 lb 9.6 oz)   SpO2 97%  Body mass index is 31.57 kg/m².    Physical Exam:  Constitutional: Well-developed and well-nourished male in NAD. Not diaphoretic. No distress.   Skin: warm, dry, intact  Cardiovascular: Regular rate and rhythm without murmur. Radial pulses are intact and equal bilaterally.  Pulmonary: Clear to ausculation. Normal effort. No rales, ronchi, or wheezing.  Extremities: No cyanosis, clubbing, erythema, nor edema.   Monofilament testing with a 10 gram force: sensation intact: intact bilaterally  Visual Inspection: Feet without maceration, ulcers, fissures.  Pedal pulses: intact bilaterally  Neurological: Alert and oriented x 3.   Psychiatric:  Behavior, mood, and affect are appropriate.      Assessment and Plan:     The following treatment plan was discussed:  I have reviewed labs from October with patient and answered all questions.    1. Obesity (BMI 30-39.9)  Chronic ongoing health concern.  Discussed appropriate diet " lifestyle modifications.  Recommended patient continue to decrease ice cream.  - Patient identified as having weight management issue.  Appropriate orders and counseling given.    2. Type 2 diabetes mellitus with hyperglycemia, without long-term current use of insulin (HCC)  Chronic well-controlled condition.  Repeat labs in 6 months.  Patient will continue on metformin 500 mg in a.m. 1000 mg at night, Jardiance 1 5 mg daily, and glimepiride 4 mg every morning.  Refill sent to pharmacy.  Monofilament exam performed today in clinic.  Counseled patient on continuing to not smoke cigarettes.  - metFORMIN ER (GLUCOPHAGE XR) 500 MG TABLET SR 24 HR; take 500 mg in the morning and 1000 mg at night.  Dispense: 190 Tablet; Refill: 3  - Diabetic Monofilament Lower Extremity Exam  - Comp Metabolic Panel; Future  - atorvastatin (LIPITOR) 20 MG Tab; Take 1 Tablet by mouth every evening.  Dispense: 90 Tablet; Refill: 3  - glimepiride (AMARYL) 4 MG Tab; Take 1 Tablet by mouth every morning. TAKE 1 TABLET BY MOUTH DAILY  Dispense: 90 Tablet; Refill: 2  - Empagliflozin 25 MG Tab; Take 25 mg by mouth every day.  Dispense: 90 Tablet; Refill: 0  - lisinopril (PRINIVIL) 2.5 MG Tab; Take 1 Tablet by mouth every day.  Dispense: 90 Tablet; Refill: 3    3. Essential hypertension  Chronic well-controlled.  Patient continue on lisinopril 2.5 mg daily.  We will continue to monitor.  Patient is up-to-date with labs  - CBC WITH DIFFERENTIAL; Future    4. Need for vaccination  - INFLUENZA VACCINE QUAD INJ (PF)    5. Colon cancer screening  Patient reports colonoscopy was completed within the last year.  We will be requesting records.          Any change or worsening of signs or symptoms, patient encouraged to follow-up or report to urgent care or emergency room for further evaluation. Patient verbalizes understanding and agrees.    Follow-Up: Return in about 6 months (around 6/19/2023) for DM, est with New PCP.      PLEASE NOTE: This dictation  was created using voice recognition software. I have made every reasonable attempt to correct obvious errors, but I expect that there are errors of grammar and possibly content that I did not discover before finalizing the note.

## 2022-12-19 NOTE — ASSESSMENT & PLAN NOTE
" 10/11/2022  8:07 AM 12/5/2022  8:11 AM 12/19/2022  8:18 AM   Vitals      Weight 190  190  195.6    Height 1.676 m (5' 6\")  1.676 m (5' 6\")  1.676 m (5' 6\")    BMI 30.67 kg/m2  30.67 kg/m2  31.57 kg/m2        Chronic health concern.  Patient does not identify that he is overweight.  He is working on cutting out ice cream.  Overall weight is stable, patient does have heavy boots on today and did not take them off prior to waiting.  "

## 2022-12-19 NOTE — LETTER
[facility]  LEEANN Bucio  [address] [place]  [place] [address]  Fax: [phone]   Authorization for Release/Disclosure of   Protected Health Information   Name: ANGEL VAZQUEZ : 1960 SSN: [id number]   Address: [address] [place] [address]  [place] [address] Phone:    [phone] (home)    I authorize the entity listed below to release/disclose the PHI below to:   [facility]/LEEANN Bucio and LEEANN Bucio   Provider or Entity Name:     Address   City, State, Zip   Phone:      Fax:     Reason for request: continuity of care   Information to be released:    [  ] LAST COLONOSCOPY,  including any PATH REPORT and follow-up  [  ] LAST FIT/COLOGUARD RESULT [  ] LAST DEXA  [  ] LAST MAMMOGRAM  [  ] LAST PAP  [  ] LAST LABS [  ] RETINA EXAM REPORT  [  ] IMMUNIZATION RECORDS  [  ] Release all info      [  ] Check here and initial the line next to each item to release ALL health information INCLUDING  _____ Care and treatment for drug and / or alcohol abuse  _____ HIV testing, infection status, or AIDS  _____ Genetic Testing    DATES OF SERVICE OR TIME PERIOD TO BE DISCLOSED: _____________  I understand and acknowledge that:  * This Authorization may be revoked at any time by you in writing, except if your health information has already been used or disclosed.  * Your health information that will be used or disclosed as a result of you signing this authorization could be re-disclosed by the recipient. If this occurs, your re-disclosed health information may no longer be protected by State or Federal laws.  * You may refuse to sign this Authorization. Your refusal will not affect your ability to obtain treatment.  * This Authorization becomes effective upon signing and will  on (date) __________.      If no date is indicated, this Authorization will  one (1) year from the signature date.    Name: Angel Vazquez    Signature:   Date:      12/19/2022       PLEASE FAX REQUESTED RECORDS BACK TO: [phone]

## 2022-12-19 NOTE — ASSESSMENT & PLAN NOTE
Chronic well-controlled condition.  Last labs from October indicate A1c level at 6.8%.  Patient denies any hyper or hypoglycemic events.  Reviewed blood sugar log from patient which does not show any hyper or hypoglycemic numbers.  Patient is currently on Jardiance 25 mg daily, glimepiride 4 mg in the a.m., and metformin 500 mg in the a.m. and 1000 mg at night.  Patient reports he is tolerating his medications well without any adverse effects.  Appropriately on lisinopril and atorvastatin.  Patient is due for monofilament exam.

## 2022-12-19 NOTE — ASSESSMENT & PLAN NOTE
Blood pressure well controlled today in clinic.  Patient is currently on lisinopril 12.5 mg daily.  Patient is up-to-date with labs.

## 2022-12-21 ENCOUNTER — PHARMACY VISIT (OUTPATIENT)
Dept: PHARMACY | Facility: MEDICAL CENTER | Age: 62
End: 2022-12-21
Payer: COMMERCIAL

## 2022-12-22 ENCOUNTER — APPOINTMENT (OUTPATIENT)
Dept: INFECTIOUS DISEASES | Facility: MEDICAL CENTER | Age: 62
End: 2022-12-22
Attending: INTERNAL MEDICINE
Payer: COMMERCIAL

## 2022-12-30 ENCOUNTER — NON-PROVIDER VISIT (OUTPATIENT)
Dept: VASCULAR LAB | Facility: MEDICAL CENTER | Age: 62
End: 2022-12-30
Attending: INTERNAL MEDICINE
Payer: COMMERCIAL

## 2022-12-30 PROCEDURE — 99212 OFFICE O/P EST SF 10 MIN: CPT

## 2022-12-31 NOTE — NON-PROVIDER
Hepatitis C Clinic Visit  Date of Service: 12/30/22    Primary care physician: LEEANN Bucio    Reason for consult: Hepatitis C    Hank Nikhil Vazquez is a 62 y.o. old patient who comes in today for follow up of Hepatitis C treatment with Epclusa. There were difficulties in obtaining insurance coverage for his script, but our technician, Nimesh, did an excellent job staying in touch and keeping him aware of the state of affairs, per the patient. The patient stated he has a back injury bothering him, with no surgery planned, but otherwise feels well. The patient has not had an opportunity to have his labwork done prior to this visit but assures me he'll have it taken care of next week. I'll follow up and discuss those labs with the patient when results are available. Patient states he has missed a couple doses but takes them when he remembers. We discussed how to handle missed doses - determine whether he's closer to the next day's dose or his missed one, and if closer to the missed dose go ahead and take it. Because he's missed a couple doses he believes he starts his last bottle of Epclusa on 2 days but isn't positive. When I call with his lab results, I will confirm the date and we will make an appointment.      Past Medical History:  Patient Active Problem List    Diagnosis Date Noted    Obesity (BMI 30-39.9) 12/19/2022    Elevated liver enzymes 04/12/2022    Chronic hepatitis C without hepatic coma (HCC) 04/12/2022    Type 2 diabetes mellitus (HCC) 06/29/2021    Lymphocytic colitis 06/29/2021    Essential hypertension 04/16/2015       Past Surgical History:  Past Surgical History:   Procedure Laterality Date    OTHER      tubes in ears as child    OTHER ORTHOPEDIC SURGERY      Left elbow ORIF    TONSILLECTOMY         Allergies:  Patient has no known allergies.    SOCIAL HISTORY  Social History     Tobacco Use   Smoking Status Former   Smokeless Tobacco Never        Medications:    Current  Outpatient Medications:     metFORMIN ER (GLUCOPHAGE XR) 500 MG TABLET SR 24 HR, take 500 mg in the morning and 1000 mg at night., Disp: 190 Tablet, Rfl: 3    atorvastatin (LIPITOR) 20 MG Tab, Take 1 Tablet by mouth every evening., Disp: 90 Tablet, Rfl: 3    glimepiride (AMARYL) 4 MG Tab, Take 1 Tablet by mouth every morning. TAKE 1 TABLET BY MOUTH DAILY, Disp: 90 Tablet, Rfl: 2    Empagliflozin 25 MG Tab, Take 25 mg by mouth every day., Disp: 90 Tablet, Rfl: 0    lisinopril (PRINIVIL) 2.5 MG Tab, Take 1 Tablet by mouth every day., Disp: 90 Tablet, Rfl: 3    Sofosbuvir-Velpatasvir (EPCLUSA) 400-100 MG Tab, Take 1 Tablet by mouth every day at 6 PM for 84 days., Disp: 28 Tablet, Rfl: 2    glucose blood (COOL BLOOD GLUCOSE TEST STRIPS) strip, 1 Strip by Other route every day., Disp: 100 Strip, Rfl: 3    Lancets, Lancets order: Lancets for insurance approved meter. Sig: use once daily for high or low sugar. #100 RF x 3, Disp: 200 Each, Rfl: 2    Blood Glucose Monitoring Suppl Device, Meter: Dispense Device of Insurance Preference. Sig. Use as directed for blood sugar monitoring. #1. NR., Disp: 1 Each, Rfl: 0    Fenugreek 610 MG Cap, Take 610 mg by mouth every day., Disp: , Rfl:     Hepatitis Medication History and Current Regimen  Epclusa    DATA REVIEW  HCV genotype: 2b  HCV resistance:   Prior treatment status: Naive  Risk factors: IVDU history  Cirrhosis: F2/F3 (US shows possible cirrhosis)  CTP score: Likely class A  FIB-4 score: 0.51 in June 2022  Liver fibrosis panel:     HCV PCR at start of treatment:  55,344,584  HBV serologies: Surface antigen, core and surface antibodies all negative  HAV serology: Non-reactive  HIV Ab: Non-reactive      Other Pertinent Blood Work:   Lab Results   Component Value Date    SODIUM 142 03/22/2022    POTASSIUM 5.5 03/22/2022    CHLORIDE 103 03/22/2022    CO2 29 03/22/2022    ANION 10.0 03/22/2022    GLUCOSE 191 (H) 03/22/2022    BUN 17 03/22/2022    CREATININE 0.92 03/22/2022     CALCIUM 9.7 03/22/2022    ASTSGOT 27 07/07/2022    ALTSGPT 45 07/07/2022    ALKPHOSPHAT 54 07/07/2022    TBILIRUBIN 0.8 07/07/2022    ALBUMIN 4.2 07/07/2022    AGRATIO 1.8 03/22/2022       HCV medication checked for DDI with current medication  Yes, patient has recently been prescribed atorvastatin 20 mg. This was prescribed after the start of Epclusa. An interaction between the two medications exists in which AUC and maximum serum concentration of atorvastatin may be increased. Specifically, this was studied in 40 mg atorvastatin. The patient is nearly finished with his course of Epclusa, and his dose of atorvastatin is at the lower end, so the patient will monitor for muscle pain aside from his back pain. Discussed this interaction with the patient.    Hepatitis Vaccines Received  Hep A: Not Immune  Hep B: Not Immune    ASSESSMENT AND PLAN    1. Hepatitis C     HCV Recommendations from Today's Visit  Will continue Epclusa 1 tablet daily for the remainder of the 12 weeks of therapy. The patient will have labs done next week at a [facility] lab and I will review the labs and call the patient to discuss results. We will meet one last time a week after the patient's final dose of Epclusa.    Recommended to the patient to stop consuming alcohol and tobacco use.     Start Date of HCV Medication: 10/24/2022  Anticipated/End Date of HCV Medication: 1/29/2023    Jamar John, [place]    CC:  LEEANN Bucio Kevin, M.D. Jason Dent, PharmD Jessica Thompson, PharmD Adrienne Desens, PharmD  Patient Consult Note    TIME IN: 1113  TIME OUT: 112

## 2023-01-17 ENCOUNTER — HOSPITAL ENCOUNTER (OUTPATIENT)
Dept: LAB | Facility: MEDICAL CENTER | Age: 63
End: 2023-01-17
Attending: NURSE PRACTITIONER
Payer: COMMERCIAL

## 2023-01-17 DIAGNOSIS — E11.65 TYPE 2 DIABETES MELLITUS WITH HYPERGLYCEMIA, WITHOUT LONG-TERM CURRENT USE OF INSULIN (HCC): ICD-10-CM

## 2023-01-17 DIAGNOSIS — I10 ESSENTIAL HYPERTENSION: ICD-10-CM

## 2023-01-17 LAB
ALBUMIN SERPL BCP-MCNC: 4.8 G/DL (ref 3.2–4.9)
ALBUMIN/GLOB SERPL: 1.8 G/DL
ALP SERPL-CCNC: 53 U/L (ref 30–99)
ALT SERPL-CCNC: 23 U/L (ref 2–50)
ANION GAP SERPL CALC-SCNC: 12 MMOL/L (ref 7–16)
AST SERPL-CCNC: 19 U/L (ref 12–45)
BASOPHILS # BLD AUTO: 0.7 % (ref 0–1.8)
BASOPHILS # BLD: 0.05 K/UL (ref 0–0.12)
BILIRUB SERPL-MCNC: 0.8 MG/DL (ref 0.1–1.5)
BUN SERPL-MCNC: 22 MG/DL (ref 8–22)
CALCIUM ALBUM COR SERPL-MCNC: 9.1 MG/DL (ref 8.5–10.5)
CALCIUM SERPL-MCNC: 9.7 MG/DL (ref 8.5–10.5)
CHLORIDE SERPL-SCNC: 103 MMOL/L (ref 96–112)
CO2 SERPL-SCNC: 26 MMOL/L (ref 20–33)
CREAT SERPL-MCNC: 1.27 MG/DL (ref 0.5–1.4)
EOSINOPHIL # BLD AUTO: 0.28 K/UL (ref 0–0.51)
EOSINOPHIL NFR BLD: 3.7 % (ref 0–6.9)
ERYTHROCYTE [DISTWIDTH] IN BLOOD BY AUTOMATED COUNT: 42.9 FL (ref 35.9–50)
GFR SERPLBLD CREATININE-BSD FMLA CKD-EPI: 64 ML/MIN/1.73 M 2
GLOBULIN SER CALC-MCNC: 2.6 G/DL (ref 1.9–3.5)
GLUCOSE SERPL-MCNC: 130 MG/DL (ref 65–99)
HCT VFR BLD AUTO: 48.5 % (ref 42–52)
HGB BLD-MCNC: 16.5 G/DL (ref 14–18)
IMM GRANULOCYTES # BLD AUTO: 0.02 K/UL (ref 0–0.11)
IMM GRANULOCYTES NFR BLD AUTO: 0.3 % (ref 0–0.9)
LYMPHOCYTES # BLD AUTO: 1.51 K/UL (ref 1–4.8)
LYMPHOCYTES NFR BLD: 19.8 % (ref 22–41)
MCH RBC QN AUTO: 30.8 PG (ref 27–33)
MCHC RBC AUTO-ENTMCNC: 34 G/DL (ref 33.7–35.3)
MCV RBC AUTO: 90.5 FL (ref 81.4–97.8)
MONOCYTES # BLD AUTO: 0.62 K/UL (ref 0–0.85)
MONOCYTES NFR BLD AUTO: 8.1 % (ref 0–13.4)
NEUTROPHILS # BLD AUTO: 5.15 K/UL (ref 1.82–7.42)
NEUTROPHILS NFR BLD: 67.4 % (ref 44–72)
NRBC # BLD AUTO: 0 K/UL
NRBC BLD-RTO: 0 /100 WBC
PLATELET # BLD AUTO: 186 K/UL (ref 164–446)
PMV BLD AUTO: 10.3 FL (ref 9–12.9)
POTASSIUM SERPL-SCNC: 4.6 MMOL/L (ref 3.6–5.5)
PROT SERPL-MCNC: 7.4 G/DL (ref 6–8.2)
RBC # BLD AUTO: 5.36 M/UL (ref 4.7–6.1)
SODIUM SERPL-SCNC: 141 MMOL/L (ref 135–145)
WBC # BLD AUTO: 7.6 K/UL (ref 4.8–10.8)

## 2023-01-17 PROCEDURE — 85025 COMPLETE CBC W/AUTO DIFF WBC: CPT

## 2023-01-17 PROCEDURE — 80053 COMPREHEN METABOLIC PANEL: CPT

## 2023-01-17 PROCEDURE — 36415 COLL VENOUS BLD VENIPUNCTURE: CPT

## 2023-02-03 ENCOUNTER — NON-PROVIDER VISIT (OUTPATIENT)
Dept: VASCULAR LAB | Facility: MEDICAL CENTER | Age: 63
End: 2023-02-03
Attending: INTERNAL MEDICINE
Payer: COMMERCIAL

## 2023-02-03 DIAGNOSIS — B18.2 CHRONIC HEPATITIS C WITHOUT HEPATIC COMA (HCC): ICD-10-CM

## 2023-02-03 PROCEDURE — 99212 OFFICE O/P EST SF 10 MIN: CPT

## 2023-02-04 NOTE — NON-PROVIDER
Hepatitis C Clinic Visit  Date of Service: 02/03/23    Primary care physician: LEEANN Bucio    Reason for consult: Hepatitis C    Hank Nikhil Vazquez is a 62 y.o. old patient who comes in today for follow up of Hepatitis C treatment. Patient states he feels well today, with the exception of his back injury which is prior to and separate from his Epclusa therapy. Previously discussed patient's lab work with him, which raised no issues. Patient does states he has a couple doses of Epclusa left. He had previously missed roughly a week of therapy due to insurance issues. Patient had moderate logistical issues making the appointment, living out in [place]. He is assured he can visit a [facility] lab closer to home in 12 weeks for his test of cure.      Past Medical History:  Patient Active Problem List    Diagnosis Date Noted    Obesity (BMI 30-39.9) 12/19/2022    Elevated liver enzymes 04/12/2022    Chronic hepatitis C without hepatic coma (HCC) 04/12/2022    Type 2 diabetes mellitus (HCC) 06/29/2021    Lymphocytic colitis 06/29/2021    Essential hypertension 04/16/2015       Past Surgical History:  Past Surgical History:   Procedure Laterality Date    OTHER      tubes in ears as child    OTHER ORTHOPEDIC SURGERY      Left elbow ORIF    TONSILLECTOMY         Allergies:  Patient has no known allergies.    SOCIAL HISTORY  Social History     Tobacco Use   Smoking Status Former   Smokeless Tobacco Never          Medications:    Current Outpatient Medications:     metFORMIN ER (GLUCOPHAGE XR) 500 MG TABLET SR 24 HR, take 500 mg in the morning and 1000 mg at night., Disp: 190 Tablet, Rfl: 3    atorvastatin (LIPITOR) 20 MG Tab, Take 1 Tablet by mouth every evening., Disp: 90 Tablet, Rfl: 3    glimepiride (AMARYL) 4 MG Tab, Take 1 Tablet by mouth every morning. TAKE 1 TABLET BY MOUTH DAILY, Disp: 90 Tablet, Rfl: 2    Empagliflozin 25 MG Tab, Take 25 mg by mouth every day., Disp: 90 Tablet, Rfl: 0    lisinopril  (PRINIVIL) 2.5 MG Tab, Take 1 Tablet by mouth every day., Disp: 90 Tablet, Rfl: 3    glucose blood (COOL BLOOD GLUCOSE TEST STRIPS) strip, 1 Strip by Other route every day., Disp: 100 Strip, Rfl: 3    Lancets, Lancets order: Lancets for insurance approved meter. Sig: use once daily for high or low sugar. #100 RF x 3, Disp: 200 Each, Rfl: 2    Blood Glucose Monitoring Suppl Device, Meter: Dispense Device of Insurance Preference. Sig. Use as directed for blood sugar monitoring. #1. NR., Disp: 1 Each, Rfl: 0    Fenugreek 610 MG Cap, Take 610 mg by mouth every day., Disp: , Rfl:     Hepatitis Medication History and Current Regimen  Epclusa    Labs: Reviewed    DATA REVIEW  HCV genotype: 2b  HCV resistance:   Prior treatment status: Naive  Risk factors: IVDU history  Cirrhosis: F2/F3 (US shows possible cirrhosis)  CTP score: Likely class A  FIB-4 score: 0.51 in June 2022  Liver fibrosis panel:     HCV PCR at start of treatment:  55,344,584  HBV serologies: Surface antigen, core and surface antibodies all negative  HAV serology: Non-reactive  HIV Ab: Non-reactive      Other Pertinent Blood Work:   Lab Results   Component Value Date    SODIUM 141 01/17/2023    POTASSIUM 4.6 01/17/2023    CHLORIDE 103 01/17/2023    CO2 26 01/17/2023    ANION 12.0 01/17/2023    GLUCOSE 130 (H) 01/17/2023    BUN 22 01/17/2023    CREATININE 1.27 01/17/2023    CALCIUM 9.7 01/17/2023    ASTSGOT 19 01/17/2023    ALTSGPT 23 01/17/2023    ALKPHOSPHAT 53 01/17/2023    TBILIRUBIN 0.8 01/17/2023    ALBUMIN 4.8 01/17/2023    AGRATIO 1.8 01/17/2023       HCV medication checked for DDI with current medication  Yes, no current interactions exist    Hepatitis Vaccines Received  Hep A: Not Immune  Hep B: Not Immune    ASSESSMENT AND PLAN    1. Hepatitis C     HCV Recommendations from Today's Visit  Patient has nearly completed his 12 week course of Epclusa. A few missed doses, not expected to affect SVR. No significant issues associated with Epclusa. Test  of cure in 12 weeks and final visit with Infectious Disease.    Start Date of HCV Medication: 10/24/2023  Anticipated/End Date of HCV Medication: 2/6/2023    Blood Work Ordered At Today’s visit: HCV NAAT     Follow-Up: With Infectious Disease after test    Jamar John, PharmD    CC:  LEEANN Bucio, Jessica ARRIAGA, GALDINO*  Ab Gonzalez, PharmD  Madeleine Can, IvonneD  Lesley Pollard, PharmD  Patient Consult Note    TIME IN: 1532  TIME OUT: 6359

## 2023-04-19 ENCOUNTER — HOSPITAL ENCOUNTER (OUTPATIENT)
Dept: LAB | Facility: MEDICAL CENTER | Age: 63
End: 2023-04-19
Attending: INTERNAL MEDICINE
Payer: COMMERCIAL

## 2023-04-19 DIAGNOSIS — B18.2 CHRONIC HEPATITIS C WITHOUT HEPATIC COMA (HCC): ICD-10-CM

## 2023-04-19 PROCEDURE — 36415 COLL VENOUS BLD VENIPUNCTURE: CPT

## 2023-04-19 PROCEDURE — 87522 HEPATITIS C REVRS TRNSCRPJ: CPT

## 2023-04-22 LAB
HCV RNA SERPL NAA+PROBE-ACNC: NOT DETECTED IU/ML
HCV RNA SERPL NAA+PROBE-LOG IU: NOT DETECTED LOG IU/ML
HCV RNA SERPL QL NAA+PROBE: NOT DETECTED

## 2023-04-25 ENCOUNTER — OFFICE VISIT (OUTPATIENT)
Dept: INFECTIOUS DISEASES | Facility: MEDICAL CENTER | Age: 63
End: 2023-04-25
Attending: INTERNAL MEDICINE
Payer: COMMERCIAL

## 2023-04-25 VITALS
DIASTOLIC BLOOD PRESSURE: 62 MMHG | BODY MASS INDEX: 31.29 KG/M2 | TEMPERATURE: 98 F | WEIGHT: 194.67 LBS | OXYGEN SATURATION: 92 % | HEIGHT: 66 IN | SYSTOLIC BLOOD PRESSURE: 112 MMHG | RESPIRATION RATE: 16 BRPM | HEART RATE: 92 BPM

## 2023-04-25 DIAGNOSIS — B18.2 CHRONIC HEPATITIS C WITHOUT HEPATIC COMA (HCC): ICD-10-CM

## 2023-04-25 DIAGNOSIS — K52.832 LYMPHOCYTIC COLITIS: ICD-10-CM

## 2023-04-25 DIAGNOSIS — L28.2 PRURITIC RASH: ICD-10-CM

## 2023-04-25 PROCEDURE — 99211 OFF/OP EST MAY X REQ PHY/QHP: CPT | Performed by: INTERNAL MEDICINE

## 2023-04-25 PROCEDURE — 99213 OFFICE O/P EST LOW 20 MIN: CPT | Performed by: INTERNAL MEDICINE

## 2023-04-25 ASSESSMENT — FIBROSIS 4 INDEX: FIB4 SCORE: 1.3

## 2023-04-25 NOTE — PROGRESS NOTES
[facility] FOLLOW-UP NOTE     Date of Service: 4/25/2023    Chief Complaint: Follow-up for hepatitis C    History of Present Illness:     Hank Vazquez is a 62 y.o. male patient with history of uncontrolled type 2 diabetes, essential hypertension, lymphocytic colitis, diagnosis chronic hepatitis C around April 2022 during routine screening, exposure thought to be IV drug abuse 20 or more years ago.  Patient completed 12 weeks of Epclusa, missed a day or so occasionally but for the most part to get appropriately.  He started this on 10/24/2022, completed on 2/6/2023.  Reviewed documentation from ID Pharm.D.  Repeat hepatitis C PCR from 4/19/2023 undetectable.  Patient notes that his right-sided abdominal pain has resolved.  He does note a new pruritic rash over bilateral knees and less intense over right elbow.    Review of Systems:  All other systems reviewed and are negative expect as noted in HPI    Past Medical History:   Diagnosis Date    Arthritis     Diabetes (HCC)     Essential hypertension 4/16/2015       Past Surgical History:   Procedure Laterality Date    OTHER      tubes in ears as child    OTHER ORTHOPEDIC SURGERY      Left elbow ORIF    TONSILLECTOMY         Family History   Problem Relation Age of Onset    Heart Attack Mother     Alzheimer's Disease Mother     Arthritis Father     Gout Father     Alzheimer's Disease Maternal Grandfather        Social History     Socioeconomic History    Marital status:      Spouse name: Not on file    Number of children: Not on file    Years of education: Not on file    Highest education level: Not on file   Occupational History    Not on file   Tobacco Use    Smoking status: Former    Smokeless tobacco: Never   Vaping Use    Vaping Use: Never used   Substance and Sexual Activity    Alcohol use: Not Currently    Drug use: Not Currently    Sexual activity: Yes     Partners: Female   Other Topics Concern    Not on file   Social History  "Narrative    Not on file     Social Determinants of Health     Financial Resource Strain: Not on file   Food Insecurity: Not on file   Transportation Needs: Not on file   Physical Activity: Not on file   Stress: Not on file   Social Connections: Not on file   Intimate Partner Violence: Not on file   Housing Stability: Not on file       No Known Allergies    Medications:  Current Outpatient Medications on File Prior to Visit   Medication Sig Dispense Refill    JARDIANCE 25 MG Tab TAKE 1 TABLET BY MOUTH EVERY DAY 90 Tablet 3    metFORMIN ER (GLUCOPHAGE XR) 500 MG TABLET SR 24 HR take 500 mg in the morning and 1000 mg at night. 190 Tablet 3    atorvastatin (LIPITOR) 20 MG Tab Take 1 Tablet by mouth every evening. 90 Tablet 3    glimepiride (AMARYL) 4 MG Tab Take 1 Tablet by mouth every morning. TAKE 1 TABLET BY MOUTH DAILY 90 Tablet 2    lisinopril (PRINIVIL) 2.5 MG Tab Take 1 Tablet by mouth every day. 90 Tablet 3    glucose blood (COOL BLOOD GLUCOSE TEST STRIPS) strip 1 Strip by Other route every day. 100 Strip 3    Lancets Lancets order: Lancets for insurance approved meter. Sig: use once daily for high or low sugar. #100 RF x 3 200 Each 2    Blood Glucose Monitoring Suppl Device Meter: Dispense Device of Insurance Preference. Sig. Use as directed for blood sugar monitoring. #1. NR. 1 Each 0    Fenugreek 610 MG Cap Take 610 mg by mouth every day.       No current facility-administered medications on file prior to visit.       Physical Exam:   Vital Signs: /62 (BP Location: Left arm, Patient Position: Sitting, BP Cuff Size: Large adult)   Pulse 92   Temp 36.7 °C (98 °F) (Temporal)   Resp 16   Ht 1.676 m (5' 6\")   Wt 88.3 kg (194 lb 10.7 oz)   SpO2 92%   BMI 31.42 kg/m²   Vital signs reviewed  Constitutional: Patient is oriented to person, place, and time. Appears well-developed and well-nourished. No distress  Eyes: Conjunctivae normal  Mouth/Throat: Lips without lesions  Cardiovascular: Normal rate. No " pedal edema.  Pulmonary/Chest: No respiratory distress. Unlabored respiratory effort  Abdominal: Protuberant.  Musculoskeletal: No joint tenderness, swelling, erythema, or restriction of motion noted.  Neurological: Alert and oriented to person, place, and time. No gross cranial nerve deficit. No focal neural deficit noted  Skin: Small pinkish papular ~1mm lesions over bilateral knees, pruritic, nonblanching, nonvesicular  Psychiatric: Normal mood and affect. Behavior is normal.     LABS:  WBC   Date/Time Value Ref Range Status   01/17/2023 09:02 AM 7.6 4.8 - 10.8 K/uL Final     RBC   Date/Time Value Ref Range Status   01/17/2023 09:02 AM 5.36 4.70 - 6.10 M/uL Final     Hemoglobin   Date/Time Value Ref Range Status   01/17/2023 09:02 AM 16.5 14.0 - 18.0 g/dL Final     Hematocrit   Date/Time Value Ref Range Status   01/17/2023 09:02 AM 48.5 42.0 - 52.0 % Final     MCV   Date/Time Value Ref Range Status   01/17/2023 09:02 AM 90.5 81.4 - 97.8 fL Final     MCH   Date/Time Value Ref Range Status   01/17/2023 09:02 AM 30.8 27.0 - 33.0 pg Final     MCHC   Date/Time Value Ref Range Status   01/17/2023 09:02 AM 34.0 33.7 - 35.3 g/dL Final     MPV   Date/Time Value Ref Range Status   01/17/2023 09:02 AM 10.3 9.0 - 12.9 fL Final     Sodium   Date/Time Value Ref Range Status   01/17/2023 09:02  135 - 145 mmol/L Final     Potassium   Date/Time Value Ref Range Status   01/17/2023 09:02 AM 4.6 3.6 - 5.5 mmol/L Final     Chloride   Date/Time Value Ref Range Status   01/17/2023 09:02  96 - 112 mmol/L Final     Co2   Date/Time Value Ref Range Status   01/17/2023 09:02 AM 26 20 - 33 mmol/L Final     Glucose   Date/Time Value Ref Range Status   01/17/2023 09:02  (H) 65 - 99 mg/dL Final     Bun   Date/Time Value Ref Range Status   01/17/2023 09:02 AM 22 8 - 22 mg/dL Final     Creatinine   Date/Time Value Ref Range Status   01/17/2023 09:02 AM 1.27 0.50 - 1.40 mg/dL Final     Alkaline Phosphatase   Date/Time Value Ref  Range Status   01/17/2023 09:02 AM 53 30 - 99 U/L Final     AST(SGOT)   Date/Time Value Ref Range Status   01/17/2023 09:02 AM 19 12 - 45 U/L Final     ALT(SGPT)   Date/Time Value Ref Range Status   01/17/2023 09:02 AM 23 2 - 50 U/L Final     Total Bilirubin   Date/Time Value Ref Range Status   01/17/2023 09:02 AM 0.8 0.1 - 1.5 mg/dL Final      No results found for: CPKTOTAL     MICRO:  No results found for: BLOODCULTU, BLDCULT, BCHOLD      Latest pertinent labs were reviewed    IMAGING STUDIES:  As above    Assessment:   Hank Vazquez is a 62 y.o. male patient with history of uncontrolled type 2 diabetes, essential hypertension, lymphocytic colitis, here for follow-up after completing treatment of hepatitis C successfully, now cured.    Plan:   -Discussed lack of immunity against hepatitis C reinfection and cautioned against high risk exposures   -Discussed persistent lifelong positivity of thalassemia antibody and need for PCR to assess for reinfections or recurrences  -Discussed liver fibrosis and need to avoid further hepatotoxic substances including alcohol  -Low potency topical hydrocortisone for the pruritic rash, follow-up with PCP    ID clinic follow-up as needed    Rohit Roque M.D.    Please note that this dictation was created using voice recognition software. I have worked with technical experts from [employer] [facility]  [email or web address] to optimize the interface.  I have made every reasonable attempt to correct obvious errors, but there may be errors of grammar and possibly content that I did not discover before finalizing the note.

## 2023-08-02 ENCOUNTER — OFFICE VISIT (OUTPATIENT)
Dept: MEDICAL GROUP | Facility: PHYSICIAN GROUP | Age: 63
End: 2023-08-02
Payer: COMMERCIAL

## 2023-08-02 VITALS
TEMPERATURE: 98.2 F | OXYGEN SATURATION: 92 % | WEIGHT: 195 LBS | SYSTOLIC BLOOD PRESSURE: 114 MMHG | HEART RATE: 65 BPM | HEIGHT: 66 IN | RESPIRATION RATE: 18 BRPM | BODY MASS INDEX: 31.34 KG/M2 | DIASTOLIC BLOOD PRESSURE: 66 MMHG

## 2023-08-02 DIAGNOSIS — E11.65 TYPE 2 DIABETES MELLITUS WITH HYPERGLYCEMIA, WITHOUT LONG-TERM CURRENT USE OF INSULIN (HCC): ICD-10-CM

## 2023-08-02 DIAGNOSIS — R53.83 OTHER FATIGUE: ICD-10-CM

## 2023-08-02 DIAGNOSIS — E55.9 VITAMIN D DEFICIENCY: ICD-10-CM

## 2023-08-02 DIAGNOSIS — Z87.891 STOPPED SMOKING WITH GREATER THAN 30 PACK YEAR HISTORY: ICD-10-CM

## 2023-08-02 DIAGNOSIS — Z12.5 ENCOUNTER FOR SCREENING FOR MALIGNANT NEOPLASM OF PROSTATE: ICD-10-CM

## 2023-08-02 DIAGNOSIS — R74.8 ELEVATED LIVER ENZYMES: ICD-10-CM

## 2023-08-02 DIAGNOSIS — E78.5 HYPERLIPIDEMIA, UNSPECIFIED HYPERLIPIDEMIA TYPE: ICD-10-CM

## 2023-08-02 DIAGNOSIS — Z13.220 SCREENING FOR LIPID DISORDERS: ICD-10-CM

## 2023-08-02 DIAGNOSIS — E11.9 TYPE 2 DIABETES MELLITUS (HCC): ICD-10-CM

## 2023-08-02 LAB
HBA1C MFR BLD: 8 % (ref ?–5.8)
POCT INT CON NEG: NEGATIVE
POCT INT CON POS: POSITIVE

## 2023-08-02 PROCEDURE — 3074F SYST BP LT 130 MM HG: CPT | Performed by: NURSE PRACTITIONER

## 2023-08-02 PROCEDURE — 99214 OFFICE O/P EST MOD 30 MIN: CPT | Performed by: NURSE PRACTITIONER

## 2023-08-02 PROCEDURE — 83036 HEMOGLOBIN GLYCOSYLATED A1C: CPT | Performed by: NURSE PRACTITIONER

## 2023-08-02 PROCEDURE — 3078F DIAST BP <80 MM HG: CPT | Performed by: NURSE PRACTITIONER

## 2023-08-02 RX ORDER — GLIMEPIRIDE 4 MG/1
4 TABLET ORAL EVERY MORNING
Qty: 90 TABLET | Refills: 3 | Status: SHIPPED | OUTPATIENT
Start: 2023-08-02

## 2023-08-02 RX ORDER — ATORVASTATIN CALCIUM 20 MG/1
20 TABLET, FILM COATED ORAL EVERY EVENING
Qty: 90 TABLET | Refills: 3 | Status: SHIPPED | OUTPATIENT
Start: 2023-08-02

## 2023-08-02 RX ORDER — METFORMIN HYDROCHLORIDE 500 MG/1
TABLET, EXTENDED RELEASE ORAL
Qty: 270 TABLET | Refills: 3 | Status: SHIPPED | OUTPATIENT
Start: 2023-08-02 | End: 2023-12-28

## 2023-08-02 RX ORDER — LISINOPRIL 2.5 MG/1
2.5 TABLET ORAL DAILY
Qty: 90 TABLET | Refills: 3 | Status: SHIPPED | OUTPATIENT
Start: 2023-08-02 | End: 2023-11-16 | Stop reason: SINTOL

## 2023-08-02 ASSESSMENT — FIBROSIS 4 INDEX: FIB4 SCORE: 1.3

## 2023-08-02 ASSESSMENT — PATIENT HEALTH QUESTIONNAIRE - PHQ9: CLINICAL INTERPRETATION OF PHQ2 SCORE: 0

## 2023-08-02 NOTE — PROGRESS NOTES
"Subjective:     CC:   Chief Complaint   Patient presents with    [facility]           Medication Refill       HPI:   Hank presents today with    Type 2 diabetes mellitus (HCC)  A1c has increased from 6.8 to 8.0; pt has only been taking 2 metformins and the 3rd if needed for elevated blood sugar; will take all 3 at the same time, l work on dietary changes and redo in 3 mos    Elevated liver enzymes  Pt has hx of drug use; has been clean for years              ROS per HPI    Objective:     Exam:  /66   Pulse 65   Temp 36.8 °C (98.2 °F) (Temporal)   Resp 18   Ht 1.676 m (5' 6\") Comment: pt stated  Wt 88.5 kg (195 lb) Comment: pt c steel boat shoes  SpO2 92%   BMI 31.47 kg/m²  Body mass index is 31.47 kg/m².    Physical Exam:  Constitutional: Well-developed and well-nourished female Not diaphoretic. No distress.   Skin: warm, dry, intact, no evidence of rash or concerning lesions  Head: Atraumatic without lesions.  Eyes: Conjunctivae are normal. Pupils are equal, round. No scleral icterus.   Ears:  External ears unremarkable.   Neck: Supple, trachea midline. No thyromegaly present. No cervical or supraclavicular lymphadenopathy.  Cardiovascular: Regular rate and rhythm without murmur.   Pulmonary: Clear to ausculation. Normal effort. No rales, ronchi, or wheezing.  Extremities: No cyanosis, clubbing, erythema, nor edema.   Neurological: Alert and oriented x 3.   Psychiatric:  Behavior, mood, and affect are appropriate.        Assessment & Plan:     62 y.o. male with the following -     1. Type 2 diabetes mellitus (HCC)  - Microalbumin Creat Ratio Urine (Lab Collect); Future  - POCT Hemoglobin A1C  - HEMOGLOBIN A1C; Future  - POCT  A1C    2. Type 2 diabetes mellitus with hyperglycemia, without long-term current use of insulin (HCC)  - metFORMIN ER (GLUCOPHAGE XR) 500 MG TABLET SR 24 HR; 3 tabs PO once daily  Dispense: 270 Tablet; Refill: 3  - lisinopril (PRINIVIL) 2.5 MG Tab; Take 1 Tablet by mouth " every day.  Dispense: 90 Tablet; Refill: 3  - glimepiride (AMARYL) 4 MG Tab; Take 1 Tablet by mouth every morning.  Dispense: 90 Tablet; Refill: 3  - atorvastatin (LIPITOR) 20 MG Tab; Take 1 Tablet by mouth every evening.  Dispense: 90 Tablet; Refill: 3    3. Elevated liver enzymes    4. Other fatigue  - Comp Metabolic Panel; Future  - CBC WITH DIFFERENTIAL; Future  - TSH WITH REFLEX TO FT4; Future    5. Screening for lipid disorders  - Lipid Profile; Future    6. Encounter for screening for malignant neoplasm of prostate  - PROSTATE SPECIFIC AG SCREENING; Future    7. Vitamin D deficiency  - VITAMIN D,25 HYDROXY (DEFICIENCY); Future    8. Hyperlipidemia, unspecified hyperlipidemia type  - Lipid Profile; Future    9. Stopped smoking with greater than 30 pack year history  - CT-LUNG CANCER-SCREENING; Future         Return in about 3 months (around 11/2/2023) for DM-A1c.    Please note that this dictation was created using voice recognition software. I have made every reasonable attempt to correct obvious errors, but I expect that there are errors of grammar and possibly content that I did not discover before finalizing the note.

## 2023-08-06 NOTE — ASSESSMENT & PLAN NOTE
A1c has increased from 6.8 to 8.0; pt has only been taking 2 metformins and the 3rd if needed for elevated blood sugar; will take all 3 at the same time, l work on dietary changes and redo in 3 mos

## 2023-08-23 ENCOUNTER — PATIENT OUTREACH (OUTPATIENT)
Dept: ONCOLOGY | Facility: MEDICAL CENTER | Age: 63
End: 2023-08-23

## 2023-08-23 DIAGNOSIS — Z87.891 STOPPED SMOKING WITH GREATER THAN 30 PACK YEAR HISTORY: ICD-10-CM

## 2023-08-23 NOTE — PROGRESS NOTES
Pt left message asking to schedule his CT screening.  Pt will need to go through [facility]'s lung cancer screening program.  It looks like pcp had reached out and asked him about this.  Call placed to patient to provide information and see if that is what he would like to do.  Left message.

## 2023-08-29 ENCOUNTER — HOSPITAL ENCOUNTER (OUTPATIENT)
Dept: LAB | Facility: MEDICAL CENTER | Age: 63
End: 2023-08-29
Attending: NURSE PRACTITIONER
Payer: COMMERCIAL

## 2023-08-29 DIAGNOSIS — Z12.5 ENCOUNTER FOR SCREENING FOR MALIGNANT NEOPLASM OF PROSTATE: ICD-10-CM

## 2023-08-29 DIAGNOSIS — E55.9 VITAMIN D DEFICIENCY: ICD-10-CM

## 2023-08-29 DIAGNOSIS — Z13.220 SCREENING FOR LIPID DISORDERS: ICD-10-CM

## 2023-08-29 DIAGNOSIS — E11.9 TYPE 2 DIABETES MELLITUS (HCC): ICD-10-CM

## 2023-08-29 DIAGNOSIS — R53.83 OTHER FATIGUE: ICD-10-CM

## 2023-08-29 DIAGNOSIS — E78.5 HYPERLIPIDEMIA, UNSPECIFIED HYPERLIPIDEMIA TYPE: ICD-10-CM

## 2023-08-29 LAB
25(OH)D3 SERPL-MCNC: 78 NG/ML (ref 30–100)
ALBUMIN SERPL BCP-MCNC: 4.8 G/DL (ref 3.2–4.9)
ALBUMIN/GLOB SERPL: 1.9 G/DL
ALP SERPL-CCNC: 64 U/L (ref 30–99)
ALT SERPL-CCNC: 21 U/L (ref 2–50)
ANION GAP SERPL CALC-SCNC: 11 MMOL/L (ref 7–16)
AST SERPL-CCNC: 19 U/L (ref 12–45)
BASOPHILS # BLD AUTO: 0.5 % (ref 0–1.8)
BASOPHILS # BLD: 0.04 K/UL (ref 0–0.12)
BILIRUB SERPL-MCNC: 1.3 MG/DL (ref 0.1–1.5)
BUN SERPL-MCNC: 20 MG/DL (ref 8–22)
CALCIUM ALBUM COR SERPL-MCNC: 9.3 MG/DL (ref 8.5–10.5)
CALCIUM SERPL-MCNC: 9.9 MG/DL (ref 8.5–10.5)
CHLORIDE SERPL-SCNC: 101 MMOL/L (ref 96–112)
CHOLEST SERPL-MCNC: 116 MG/DL (ref 100–199)
CO2 SERPL-SCNC: 27 MMOL/L (ref 20–33)
CREAT SERPL-MCNC: 1.29 MG/DL (ref 0.5–1.4)
EOSINOPHIL # BLD AUTO: 0.51 K/UL (ref 0–0.51)
EOSINOPHIL NFR BLD: 6.5 % (ref 0–6.9)
ERYTHROCYTE [DISTWIDTH] IN BLOOD BY AUTOMATED COUNT: 41.9 FL (ref 35.9–50)
EST. AVERAGE GLUCOSE BLD GHB EST-MCNC: 186 MG/DL
GFR SERPLBLD CREATININE-BSD FMLA CKD-EPI: 63 ML/MIN/1.73 M 2
GLOBULIN SER CALC-MCNC: 2.5 G/DL (ref 1.9–3.5)
GLUCOSE SERPL-MCNC: 119 MG/DL (ref 65–99)
HBA1C MFR BLD: 8.1 % (ref 4–5.6)
HCT VFR BLD AUTO: 50.7 % (ref 42–52)
HDLC SERPL-MCNC: 31 MG/DL
HGB BLD-MCNC: 16.8 G/DL (ref 14–18)
IMM GRANULOCYTES # BLD AUTO: 0.02 K/UL (ref 0–0.11)
IMM GRANULOCYTES NFR BLD AUTO: 0.3 % (ref 0–0.9)
LDLC SERPL CALC-MCNC: 60 MG/DL
LYMPHOCYTES # BLD AUTO: 1.7 K/UL (ref 1–4.8)
LYMPHOCYTES NFR BLD: 21.6 % (ref 22–41)
MCH RBC QN AUTO: 29.6 PG (ref 27–33)
MCHC RBC AUTO-ENTMCNC: 33.1 G/DL (ref 32.3–36.5)
MCV RBC AUTO: 89.3 FL (ref 81.4–97.8)
MONOCYTES # BLD AUTO: 0.74 K/UL (ref 0–0.85)
MONOCYTES NFR BLD AUTO: 9.4 % (ref 0–13.4)
NEUTROPHILS # BLD AUTO: 4.87 K/UL (ref 1.82–7.42)
NEUTROPHILS NFR BLD: 61.7 % (ref 44–72)
NRBC # BLD AUTO: 0 K/UL
NRBC BLD-RTO: 0 /100 WBC (ref 0–0.2)
PLATELET # BLD AUTO: 212 K/UL (ref 164–446)
PMV BLD AUTO: 10.5 FL (ref 9–12.9)
POTASSIUM SERPL-SCNC: 4.6 MMOL/L (ref 3.6–5.5)
PROT SERPL-MCNC: 7.3 G/DL (ref 6–8.2)
PSA SERPL-MCNC: 0.29 NG/ML (ref 0–4)
RBC # BLD AUTO: 5.68 M/UL (ref 4.7–6.1)
SODIUM SERPL-SCNC: 139 MMOL/L (ref 135–145)
TRIGL SERPL-MCNC: 127 MG/DL (ref 0–149)
TSH SERPL DL<=0.005 MIU/L-ACNC: 2.5 UIU/ML (ref 0.38–5.33)
WBC # BLD AUTO: 7.9 K/UL (ref 4.8–10.8)

## 2023-08-29 PROCEDURE — 83036 HEMOGLOBIN GLYCOSYLATED A1C: CPT

## 2023-08-29 PROCEDURE — 80053 COMPREHEN METABOLIC PANEL: CPT

## 2023-08-29 PROCEDURE — 82043 UR ALBUMIN QUANTITATIVE: CPT

## 2023-08-29 PROCEDURE — 84443 ASSAY THYROID STIM HORMONE: CPT

## 2023-08-29 PROCEDURE — 85025 COMPLETE CBC W/AUTO DIFF WBC: CPT

## 2023-08-29 PROCEDURE — 36415 COLL VENOUS BLD VENIPUNCTURE: CPT

## 2023-08-29 PROCEDURE — 84153 ASSAY OF PSA TOTAL: CPT

## 2023-08-29 PROCEDURE — 80061 LIPID PANEL: CPT

## 2023-08-29 PROCEDURE — 82570 ASSAY OF URINE CREATININE: CPT

## 2023-08-29 PROCEDURE — 82306 VITAMIN D 25 HYDROXY: CPT

## 2023-08-30 LAB
CREAT UR-MCNC: 119.3 MG/DL
MICROALBUMIN UR-MCNC: 2.4 MG/DL
MICROALBUMIN/CREAT UR: 20 MG/G (ref 0–30)

## 2023-09-25 ENCOUNTER — HOSPITAL ENCOUNTER (OUTPATIENT)
Dept: RADIOLOGY | Facility: MEDICAL CENTER | Age: 63
End: 2023-09-25
Attending: FAMILY MEDICINE
Payer: COMMERCIAL

## 2023-09-25 DIAGNOSIS — Z87.891 STOPPED SMOKING WITH GREATER THAN 30 PACK YEAR HISTORY: ICD-10-CM

## 2023-09-25 PROCEDURE — 71250 CT THORAX DX C-: CPT

## 2023-10-06 DIAGNOSIS — N28.89 RENAL MASS: ICD-10-CM

## 2023-10-17 ENCOUNTER — TELEPHONE (OUTPATIENT)
Dept: URGENT CARE | Facility: PHYSICIAN GROUP | Age: 63
End: 2023-10-17
Payer: COMMERCIAL

## 2023-10-17 NOTE — TELEPHONE ENCOUNTER
Patient came in stating the [facility] pharmacy didn't receive the refill for the medication below. Please advise.    metFORMIN ER (GLUCOPHAGE XR) 500 MG TABLET SR 24 HR

## 2023-10-18 NOTE — TELEPHONE ENCOUNTER
Pt has refills left which is why it was denied on 10/14. I called pharmacy to verify. Also sent pt a vidIQ message

## 2023-11-16 ENCOUNTER — OFFICE VISIT (OUTPATIENT)
Dept: MEDICAL GROUP | Facility: PHYSICIAN GROUP | Age: 63
End: 2023-11-16
Payer: COMMERCIAL

## 2023-11-16 VITALS
WEIGHT: 195.6 LBS | HEART RATE: 79 BPM | RESPIRATION RATE: 18 BRPM | OXYGEN SATURATION: 94 % | DIASTOLIC BLOOD PRESSURE: 60 MMHG | SYSTOLIC BLOOD PRESSURE: 106 MMHG | BODY MASS INDEX: 31.43 KG/M2 | HEIGHT: 66 IN | TEMPERATURE: 98.5 F

## 2023-11-16 DIAGNOSIS — R80.9 MICROALBUMINURIA: ICD-10-CM

## 2023-11-16 DIAGNOSIS — B18.2 CHRONIC HEPATITIS C WITHOUT HEPATIC COMA (HCC): ICD-10-CM

## 2023-11-16 DIAGNOSIS — E11.9 TYPE 2 DIABETES MELLITUS (HCC): ICD-10-CM

## 2023-11-16 DIAGNOSIS — R05.3 CHRONIC COUGH: ICD-10-CM

## 2023-11-16 DIAGNOSIS — Z23 NEED FOR VACCINATION: ICD-10-CM

## 2023-11-16 PROBLEM — M54.6 PAIN IN THORACIC SPINE: Status: ACTIVE | Noted: 2022-12-14

## 2023-11-16 PROBLEM — M47.816 LUMBAR SPONDYLOSIS: Status: ACTIVE | Noted: 2022-12-14

## 2023-11-16 PROBLEM — R26.89 ANTALGIC GAIT: Status: ACTIVE | Noted: 2023-05-20

## 2023-11-16 PROCEDURE — 90686 IIV4 VACC NO PRSV 0.5 ML IM: CPT | Performed by: FAMILY MEDICINE

## 2023-11-16 PROCEDURE — 3078F DIAST BP <80 MM HG: CPT | Performed by: FAMILY MEDICINE

## 2023-11-16 PROCEDURE — 3074F SYST BP LT 130 MM HG: CPT | Performed by: FAMILY MEDICINE

## 2023-11-16 PROCEDURE — 99214 OFFICE O/P EST MOD 30 MIN: CPT | Mod: 25 | Performed by: FAMILY MEDICINE

## 2023-11-16 PROCEDURE — 90471 IMMUNIZATION ADMIN: CPT | Performed by: FAMILY MEDICINE

## 2023-11-16 PROCEDURE — 92250 FUNDUS PHOTOGRAPHY W/I&R: CPT | Mod: TC | Performed by: FAMILY MEDICINE

## 2023-11-16 RX ORDER — ALBUTEROL SULFATE 90 UG/1
2 AEROSOL, METERED RESPIRATORY (INHALATION) EVERY 4 HOURS PRN
Qty: 1 EACH | Refills: 0 | Status: SHIPPED | OUTPATIENT
Start: 2023-11-16 | End: 2024-01-02 | Stop reason: SDUPTHER

## 2023-11-16 RX ORDER — LOSARTAN POTASSIUM 25 MG/1
25 TABLET ORAL DAILY
Qty: 90 TABLET | Refills: 3 | Status: SHIPPED | OUTPATIENT
Start: 2023-11-16

## 2023-11-16 ASSESSMENT — FIBROSIS 4 INDEX: FIB4 SCORE: 1.19

## 2023-11-16 NOTE — PROGRESS NOTES
Subjective:   ANGEL SCHAEFER is a 62 y.o. male here today for evaluation and management of:     Chronic cough  Itchy irritating cough for about 6-12 months.   He does not have allergies or acid reflux that he knows of. Has not smoked for >20 years had normal chest imaging for lung cancer screening. Stay off lisinopril for a month, if cough resolves, start losartan. If cough persists, restart lisinopril, try albuterol inhaler and follow up in clinic.   IMPRESSION:     1.  No acute abnormality in the chest. No suspicious pulmonary nodules or masses.  2.  Emphysema.  3.  Partially visualized lesion adjacent to the upper pole of the right kidney. It may be a solid exophytic renal lesion. Further evaluation with contrast-enhanced renal protocol abdominal MRI is advised.           Exam Ended: 09/25/23          MRI lumbar, RUQ US showed the renal lesion was a  simple cortical cysts, no follow up needed.      Was started on lisinopril 2.5 which has helped with the microalbuminuria. Will try losartan instead.       Chronic hepatitis C without hepatic coma (HCC)  Successful treatment with antiviral epclusa, he was told he is cured by GI, no GI follow up needed for now, continue to monitor lfts and clinically.              Current medicines (including changes today)  Current Outpatient Medications   Medication Sig Dispense Refill    losartan (COZAAR) 25 MG Tab Take 1 Tablet by mouth every day. For microalbuminuria. 90 Tablet 3    albuterol 108 (90 Base) MCG/ACT Aero Soln inhalation aerosol Inhale 2 Puffs every four hours as needed for Shortness of Breath. 1 Each 0    metFORMIN ER (GLUCOPHAGE XR) 500 MG TABLET SR 24 HR 3 tabs PO once daily 270 Tablet 3    glimepiride (AMARYL) 4 MG Tab Take 1 Tablet by mouth every morning. 90 Tablet 3    atorvastatin (LIPITOR) 20 MG Tab Take 1 Tablet by mouth every evening. 90 Tablet 3    JARDIANCE 25 MG Tab TAKE 1 TABLET BY MOUTH EVERY DAY 90 Tablet 3    glucose blood (COOL BLOOD GLUCOSE  "TEST STRIPS) strip 1 Strip by Other route every day. 100 Strip 3    Lancets Lancets order: Lancets for insurance approved meter. Sig: use once daily for high or low sugar. #100 RF x 3 200 Each 2    Blood Glucose Monitoring Suppl Device Meter: Dispense Device of Insurance Preference. Sig. Use as directed for blood sugar monitoring. #1. NR. 1 Each 0    Fenugreek 610 MG Cap Take 610 mg by mouth every day.       No current facility-administered medications for this visit.     He  has a past medical history of Arthritis, Diabetes ([facility]), and Essential hypertension (4/16/2015).    He has no past medical history of Anemia, Anxiety, Arrhythmia, Asthma, Cancer ([facility]), Clotting disorder (HCC), COPD (chronic obstructive pulmonary disease) ([facility]), Depression, Diabetic neuropathy (HCC), GERD (gastroesophageal reflux disease), Heart attack (HCC), Heart murmur, HIV (human immunodeficiency virus infection) ([facility]), Hyperlipidemia, Kidney disease, Seizure ([facility]), Stroke ([facility]), Substance abuse ([facility]), Thyroid disease, or Tuberculosis.    ROS  No chest pain, no shortness of breath, no abdominal pain       Objective:     /60 (BP Location: Right arm, Patient Position: Sitting, BP Cuff Size: Small adult)   Pulse 79   Temp 36.9 °C (98.5 °F) (Temporal)   Resp 18   Ht 1.676 m (5' 6\")   Wt 88.7 kg (195 lb 9.6 oz)   SpO2 94%  Body mass index is 31.57 kg/m².   Physical Exam:  Constitutional: Alert, no distress.  Skin: Warm, dry, good turgor, no rashes in visible areas.  Eye: Equal, round and reactive, conjunctiva clear, lids normal.  ENMT: Lips without lesions, good dentition, oropharynx clear.  Neck: Trachea midline, no masses, no thyromegaly. No cervical or supraclavicular lymphadenopathy  Respiratory: Unlabored respiratory effort, lungs clear to auscultation, no wheezes, no ronchi.  Cardiovascular: Normal S1, S2, no murmur, no edema.  Abdomen: Soft, non-tender, no masses, no hepatosplenomegaly.  Psych: Alert and oriented x3, normal " affect and mood.        Assessment and Plan:   The following treatment plan was discussed    1. Type 2 diabetes mellitus (HCC)  - POCT Retinal Eye Exam  - HEMOGLOBIN A1C; Future  - Comp Metabolic Panel; Future    2. Need for vaccination  - INFLUENZA VACCINE QUAD INJ (PF)    3. Chronic cough    4. Microalbuminuria  - MICROALBUMIN CREAT RATIO URINE; Future    5. Chronic hepatitis C without hepatic coma (HCC)    Other orders  - losartan (COZAAR) 25 MG Tab; Take 1 Tablet by mouth every day. For microalbuminuria.  Dispense: 90 Tablet; Refill: 3  - albuterol 108 (90 Base) MCG/ACT Aero Soln inhalation aerosol; Inhale 2 Puffs every four hours as needed for Shortness of Breath.  Dispense: 1 Each; Refill: 0      Followup: Return in about 3 months (around 2/16/2024) for Diabetes, cough, Lab Review.

## 2023-11-16 NOTE — ASSESSMENT & PLAN NOTE
Itchy irritating cough for about 6-12 months.   He does not have allergies or acid reflux that he knows of. Has not smoked for >20 years had normal chest imaging for lung cancer screening. Stay off lisinopril for a month, if cough resolves, start losartan. If cough persists, restart lisinopril, try albuterol inhaler and follow up in clinic.   IMPRESSION:     1.  No acute abnormality in the chest. No suspicious pulmonary nodules or masses.  2.  Emphysema.  3.  Partially visualized lesion adjacent to the upper pole of the right kidney. It may be a solid exophytic renal lesion. Further evaluation with contrast-enhanced renal protocol abdominal MRI is advised.           Exam Ended: 09/25/23          MRI lumbar, RUQ US showed the renal lesion was a  simple cortical cysts, no follow up needed.      Was started on lisinopril 2.5 which has helped with the microalbuminuria. Will try losartan instead.

## 2023-12-13 LAB — RETINAL SCREEN: NEGATIVE

## 2023-12-21 NOTE — RESULT ENCOUNTER NOTE
Darrin SCHAEFER,   Your retinal screening was normal, next is due in 1 year.   Stefania Gimenez M.D.

## 2023-12-27 DIAGNOSIS — E11.65 TYPE 2 DIABETES MELLITUS WITH HYPERGLYCEMIA, WITHOUT LONG-TERM CURRENT USE OF INSULIN (HCC): ICD-10-CM

## 2023-12-28 RX ORDER — METFORMIN HYDROCHLORIDE 500 MG/1
TABLET, EXTENDED RELEASE ORAL
Qty: 270 TABLET | Refills: 0 | Status: SHIPPED | OUTPATIENT
Start: 2023-12-28 | End: 2024-01-02 | Stop reason: SDUPTHER

## 2023-12-28 NOTE — TELEPHONE ENCOUNTER
Requested Prescriptions     Pending Prescriptions Disp Refills    metFORMIN ER (GLUCOPHAGE XR) 500 MG TABLET SR 24 HR [Pharmacy Med Name: METFORMIN ER 500MG 24HR TABS] 190 Tablet      Sig: TAKE 1 TABLET BY MOUTH IN THE MORNING AND 2 TABLETS AT NIGHT      Last office visit: 11/16/23  Last lab: 8/29/23

## 2024-01-02 ENCOUNTER — OFFICE VISIT (OUTPATIENT)
Dept: MEDICAL GROUP | Facility: PHYSICIAN GROUP | Age: 64
End: 2024-01-02
Payer: COMMERCIAL

## 2024-01-02 VITALS
HEIGHT: 66 IN | SYSTOLIC BLOOD PRESSURE: 128 MMHG | BODY MASS INDEX: 31.82 KG/M2 | DIASTOLIC BLOOD PRESSURE: 82 MMHG | HEART RATE: 74 BPM | OXYGEN SATURATION: 98 % | TEMPERATURE: 98.8 F | RESPIRATION RATE: 14 BRPM | WEIGHT: 198 LBS

## 2024-01-02 DIAGNOSIS — E55.9 VITAMIN D DEFICIENCY: ICD-10-CM

## 2024-01-02 DIAGNOSIS — E11.65 TYPE 2 DIABETES MELLITUS WITH HYPERGLYCEMIA, WITHOUT LONG-TERM CURRENT USE OF INSULIN (HCC): ICD-10-CM

## 2024-01-02 DIAGNOSIS — M47.816 LUMBAR SPONDYLOSIS: ICD-10-CM

## 2024-01-02 DIAGNOSIS — R05.3 CHRONIC COUGH: ICD-10-CM

## 2024-01-02 DIAGNOSIS — R74.8 ELEVATED LIVER ENZYMES: ICD-10-CM

## 2024-01-02 DIAGNOSIS — R53.83 OTHER FATIGUE: ICD-10-CM

## 2024-01-02 DIAGNOSIS — R41.3 MEMORY CHANGE: ICD-10-CM

## 2024-01-02 PROCEDURE — 3079F DIAST BP 80-89 MM HG: CPT | Performed by: NURSE PRACTITIONER

## 2024-01-02 PROCEDURE — 3074F SYST BP LT 130 MM HG: CPT | Performed by: NURSE PRACTITIONER

## 2024-01-02 PROCEDURE — 99214 OFFICE O/P EST MOD 30 MIN: CPT | Performed by: NURSE PRACTITIONER

## 2024-01-02 RX ORDER — ALBUTEROL SULFATE 90 UG/1
2 AEROSOL, METERED RESPIRATORY (INHALATION) EVERY 4 HOURS PRN
Qty: 1 EACH | Refills: 11 | Status: SHIPPED | OUTPATIENT
Start: 2024-01-02

## 2024-01-02 RX ORDER — METFORMIN HYDROCHLORIDE 500 MG/1
TABLET, EXTENDED RELEASE ORAL
Qty: 270 TABLET | Refills: 3 | Status: SHIPPED | OUTPATIENT
Start: 2024-01-02

## 2024-01-02 RX ORDER — EMPAGLIFLOZIN 25 MG/1
1 TABLET, FILM COATED ORAL
Qty: 90 TABLET | Refills: 3 | Status: CANCELLED | OUTPATIENT
Start: 2024-01-02

## 2024-01-02 RX ORDER — DIAPER,BRIEF,INFANT-TODD,DISP
EACH MISCELLANEOUS
COMMUNITY
End: 2024-01-02

## 2024-01-02 ASSESSMENT — FIBROSIS 4 INDEX: FIB4 SCORE: 1.21

## 2024-01-02 NOTE — PROGRESS NOTES
"Subjective:     CC:   Chief Complaint   Patient presents with    Follow-Up     Pt states he's been lacking short term memory     Requesting Labs       HPI:   ANGEL presents today with    Chronic cough  Was switched from lisinopril to losartan at his last visit   Cough much better   He has been using the albuterol a few times a day which has been helpful  Recent MRI showed no areas of concern        Elevated liver enzymes  Most recent labs several months ago showed improvement   patient will complete labs before next visit in February    Lumbar spondylosis  Is scheduled for an ablation in the near future   Work has adjusted his duties to accommodate his restrictions    Type 2 diabetes mellitus (HCC)  Brings his most recent blood sugar values   this morning was 168 has been taking all medications as described to include glimepiride 4 mg Jardiance 25 mg and metformin 1500 mg/day will complete A1c at his lab appointment next month and return for follow-up  a week later              ROS per HPI    Objective:     Exam:  /82   Pulse 74   Temp 37.1 °C (98.8 °F) (Temporal)   Resp 14   Ht 1.664 m (5' 5.5\")   Wt 89.8 kg (198 lb)   SpO2 98%   BMI 32.45 kg/m²  Body mass index is 32.45 kg/m².    Physical Exam:  Constitutional: Well-developed and well-nourished male Not diaphoretic. No distress.   Skin: warm, dry, intact, no evidence of rash or concerning lesions  Head: Atraumatic without lesions.  Eyes: Conjunctivae are normal. Pupils are equal, round. No scleral icterus.   Ears:  External ears unremarkable.   Neck: Supple, trachea midline. No thyromegaly present. No cervical or supraclavicular lymphadenopathy.  Cardiovascular: Regular rate and rhythm without murmur.   Pulmonary: Clear to ausculation. Normal effort. No rales, ronchi, or wheezing.  Extremities: No cyanosis, clubbing, erythema, nor edema.   Neurological: Alert and oriented x 3.   Psychiatric:  Behavior, mood, and affect are " appropriate.        Assessment & Plan:     63 y.o. male with the following -     1. Type 2 diabetes mellitus with hyperglycemia, without long-term current use of insulin (HCC)  - metFORMIN ER (GLUCOPHAGE XR) 500 MG TABLET SR 24 HR; TAKE 1 TABLET BY MOUTH IN THE MORNING AND 2 TABLETS AT NIGHT  Dispense: 270 Tablet; Refill: 3  - HEMOGLOBIN A1C; Future  - Comp Metabolic Panel; Future    2. Chronic cough    3. Elevated liver enzymes  - Comp Metabolic Panel; Future    4. Lumbar spondylosis    5. Memory change  - TSH WITH REFLEX TO FT4; Future    6. Other fatigue  - CBC WITH DIFFERENTIAL; Future  - TSH WITH REFLEX TO FT4; Future    7. Vitamin D deficiency  - VITAMIN D,25 HYDROXY (DEFICIENCY); Future    Other orders  - albuterol 108 (90 Base) MCG/ACT Aero Soln inhalation aerosol; Inhale 2 Puffs every four hours as needed for Shortness of Breath.  Dispense: 1 Each; Refill: 11         Return in about 6 weeks (around 2/13/2024) for lab results, memory test.    Please note that this dictation was created using voice recognition software. I have made every reasonable attempt to correct obvious errors, but I expect that there are errors of grammar and possibly content that I did not discover before finalizing the note.

## 2024-01-02 NOTE — ASSESSMENT & PLAN NOTE
Was switched from lisinopril to losartan at his last visit   Cough much better   He has been using the albuterol a few times a day which has been helpful  Recent MRI showed no areas of concern

## 2024-01-02 NOTE — ASSESSMENT & PLAN NOTE
Brings his most recent blood sugar values   this morning was 168 has been taking all medications as described to include glimepiride 4 mg Jardiance 25 mg and metformin 1500 mg/day will complete A1c at his lab appointment next month and return for follow-up  a week later

## 2024-01-02 NOTE — ASSESSMENT & PLAN NOTE
Most recent labs several months ago showed improvement   patient will complete labs before next visit in February

## 2024-01-02 NOTE — ASSESSMENT & PLAN NOTE
Is scheduled for an ablation in the near future   Work has adjusted his duties to accommodate his restrictions

## 2024-02-15 ENCOUNTER — HOSPITAL ENCOUNTER (OUTPATIENT)
Dept: LAB | Facility: MEDICAL CENTER | Age: 64
End: 2024-02-15
Attending: FAMILY MEDICINE
Payer: COMMERCIAL

## 2024-02-15 DIAGNOSIS — R53.83 OTHER FATIGUE: ICD-10-CM

## 2024-02-15 DIAGNOSIS — E55.9 VITAMIN D DEFICIENCY: ICD-10-CM

## 2024-02-15 DIAGNOSIS — E11.65 TYPE 2 DIABETES MELLITUS WITH HYPERGLYCEMIA, WITHOUT LONG-TERM CURRENT USE OF INSULIN (HCC): ICD-10-CM

## 2024-02-15 DIAGNOSIS — R74.8 ELEVATED LIVER ENZYMES: ICD-10-CM

## 2024-02-15 DIAGNOSIS — R41.3 MEMORY CHANGE: ICD-10-CM

## 2024-02-15 LAB
25(OH)D3 SERPL-MCNC: 63 NG/ML (ref 30–100)
ALBUMIN SERPL BCP-MCNC: 4.3 G/DL (ref 3.2–4.9)
ALBUMIN/GLOB SERPL: 1.5 G/DL
ALP SERPL-CCNC: 64 U/L (ref 30–99)
ALT SERPL-CCNC: 25 U/L (ref 2–50)
ANION GAP SERPL CALC-SCNC: 11 MMOL/L (ref 7–16)
AST SERPL-CCNC: 18 U/L (ref 12–45)
BASOPHILS # BLD AUTO: 0.6 % (ref 0–1.8)
BASOPHILS # BLD: 0.04 K/UL (ref 0–0.12)
BILIRUB SERPL-MCNC: 0.9 MG/DL (ref 0.1–1.5)
BUN SERPL-MCNC: 18 MG/DL (ref 8–22)
CALCIUM ALBUM COR SERPL-MCNC: 9.2 MG/DL (ref 8.5–10.5)
CALCIUM SERPL-MCNC: 9.4 MG/DL (ref 8.5–10.5)
CHLORIDE SERPL-SCNC: 103 MMOL/L (ref 96–112)
CO2 SERPL-SCNC: 23 MMOL/L (ref 20–33)
CREAT SERPL-MCNC: 0.84 MG/DL (ref 0.5–1.4)
EOSINOPHIL # BLD AUTO: 0.37 K/UL (ref 0–0.51)
EOSINOPHIL NFR BLD: 6 % (ref 0–6.9)
ERYTHROCYTE [DISTWIDTH] IN BLOOD BY AUTOMATED COUNT: 41.7 FL (ref 35.9–50)
EST. AVERAGE GLUCOSE BLD GHB EST-MCNC: 212 MG/DL
GFR SERPLBLD CREATININE-BSD FMLA CKD-EPI: 98 ML/MIN/1.73 M 2
GLOBULIN SER CALC-MCNC: 2.8 G/DL (ref 1.9–3.5)
GLUCOSE SERPL-MCNC: 264 MG/DL (ref 65–99)
HBA1C MFR BLD: 9 % (ref 4–5.6)
HCT VFR BLD AUTO: 48.1 % (ref 42–52)
HGB BLD-MCNC: 16.6 G/DL (ref 14–18)
IMM GRANULOCYTES # BLD AUTO: 0.01 K/UL (ref 0–0.11)
IMM GRANULOCYTES NFR BLD AUTO: 0.2 % (ref 0–0.9)
LYMPHOCYTES # BLD AUTO: 0.97 K/UL (ref 1–4.8)
LYMPHOCYTES NFR BLD: 15.6 % (ref 22–41)
MCH RBC QN AUTO: 30.3 PG (ref 27–33)
MCHC RBC AUTO-ENTMCNC: 34.5 G/DL (ref 32.3–36.5)
MCV RBC AUTO: 87.8 FL (ref 81.4–97.8)
MONOCYTES # BLD AUTO: 0.6 K/UL (ref 0–0.85)
MONOCYTES NFR BLD AUTO: 9.7 % (ref 0–13.4)
NEUTROPHILS # BLD AUTO: 4.21 K/UL (ref 1.82–7.42)
NEUTROPHILS NFR BLD: 67.9 % (ref 44–72)
NRBC # BLD AUTO: 0 K/UL
NRBC BLD-RTO: 0 /100 WBC (ref 0–0.2)
PLATELET # BLD AUTO: 185 K/UL (ref 164–446)
PMV BLD AUTO: 10.2 FL (ref 9–12.9)
POTASSIUM SERPL-SCNC: 4 MMOL/L (ref 3.6–5.5)
PROT SERPL-MCNC: 7.1 G/DL (ref 6–8.2)
RBC # BLD AUTO: 5.48 M/UL (ref 4.7–6.1)
SODIUM SERPL-SCNC: 137 MMOL/L (ref 135–145)
TSH SERPL DL<=0.005 MIU/L-ACNC: 3.55 UIU/ML (ref 0.38–5.33)
WBC # BLD AUTO: 6.2 K/UL (ref 4.8–10.8)

## 2024-02-15 PROCEDURE — 85025 COMPLETE CBC W/AUTO DIFF WBC: CPT

## 2024-02-15 PROCEDURE — 80053 COMPREHEN METABOLIC PANEL: CPT

## 2024-02-15 PROCEDURE — 83036 HEMOGLOBIN GLYCOSYLATED A1C: CPT

## 2024-02-15 PROCEDURE — 84443 ASSAY THYROID STIM HORMONE: CPT

## 2024-02-15 PROCEDURE — 82306 VITAMIN D 25 HYDROXY: CPT

## 2024-02-15 PROCEDURE — 36415 COLL VENOUS BLD VENIPUNCTURE: CPT

## 2024-03-13 DIAGNOSIS — E11.65 TYPE 2 DIABETES MELLITUS WITH HYPERGLYCEMIA, WITHOUT LONG-TERM CURRENT USE OF INSULIN (HCC): ICD-10-CM

## 2024-03-13 RX ORDER — EMPAGLIFLOZIN 25 MG/1
TABLET, FILM COATED ORAL
Qty: 90 TABLET | Refills: 3 | Status: SHIPPED | OUTPATIENT
Start: 2024-03-13

## 2024-03-13 NOTE — TELEPHONE ENCOUNTER
Requested Prescriptions     Pending Prescriptions Disp Refills    JARDIANCE 25 MG Tab [Pharmacy Med Name: JARDIANCE 25MG TABLETS] 90 Tablet 3     Sig: TAKE 1 TABLET BY MOUTH EVERY DAY      Last office visit: 1/2/24  Last lab: 2/15/24

## 2024-04-03 ENCOUNTER — APPOINTMENT (OUTPATIENT)
Dept: MEDICAL GROUP | Facility: PHYSICIAN GROUP | Age: 64
End: 2024-04-03
Payer: COMMERCIAL

## 2024-04-03 ENCOUNTER — HOSPITAL ENCOUNTER (OUTPATIENT)
Facility: MEDICAL CENTER | Age: 64
End: 2024-04-03
Attending: FAMILY MEDICINE
Payer: COMMERCIAL

## 2024-04-03 VITALS
HEART RATE: 88 BPM | OXYGEN SATURATION: 93 % | RESPIRATION RATE: 16 BRPM | TEMPERATURE: 98.6 F | HEIGHT: 66 IN | WEIGHT: 196.2 LBS | SYSTOLIC BLOOD PRESSURE: 116 MMHG | BODY MASS INDEX: 31.53 KG/M2 | DIASTOLIC BLOOD PRESSURE: 62 MMHG

## 2024-04-03 DIAGNOSIS — J30.2 SEASONAL ALLERGIES: ICD-10-CM

## 2024-04-03 DIAGNOSIS — G89.29 CHRONIC BILATERAL LOW BACK PAIN WITH BILATERAL SCIATICA: ICD-10-CM

## 2024-04-03 DIAGNOSIS — R06.02 SHORTNESS OF BREATH: ICD-10-CM

## 2024-04-03 DIAGNOSIS — I10 PRIMARY HYPERTENSION: ICD-10-CM

## 2024-04-03 DIAGNOSIS — R80.9 MICROALBUMINURIA: ICD-10-CM

## 2024-04-03 DIAGNOSIS — R05.3 CHRONIC COUGH: ICD-10-CM

## 2024-04-03 DIAGNOSIS — M54.41 CHRONIC BILATERAL LOW BACK PAIN WITH BILATERAL SCIATICA: ICD-10-CM

## 2024-04-03 DIAGNOSIS — E11.65 TYPE 2 DIABETES MELLITUS WITH HYPERGLYCEMIA, WITHOUT LONG-TERM CURRENT USE OF INSULIN (HCC): ICD-10-CM

## 2024-04-03 DIAGNOSIS — E11.9 TYPE 2 DIABETES MELLITUS (HCC): ICD-10-CM

## 2024-04-03 DIAGNOSIS — Z23 NEED FOR VACCINATION: ICD-10-CM

## 2024-04-03 DIAGNOSIS — M54.42 CHRONIC BILATERAL LOW BACK PAIN WITH BILATERAL SCIATICA: ICD-10-CM

## 2024-04-03 DIAGNOSIS — R41.3 MEMORY CHANGE: ICD-10-CM

## 2024-04-03 LAB
CREAT UR-MCNC: 82.13 MG/DL
MICROALBUMIN UR-MCNC: <1.2 MG/DL
MICROALBUMIN/CREAT UR: NORMAL MG/G (ref 0–30)

## 2024-04-03 PROCEDURE — 3074F SYST BP LT 130 MM HG: CPT | Performed by: NURSE PRACTITIONER

## 2024-04-03 PROCEDURE — 90636 HEP A/HEP B VACC ADULT IM: CPT | Performed by: NURSE PRACTITIONER

## 2024-04-03 PROCEDURE — 99214 OFFICE O/P EST MOD 30 MIN: CPT | Mod: 25 | Performed by: NURSE PRACTITIONER

## 2024-04-03 PROCEDURE — 3078F DIAST BP <80 MM HG: CPT | Performed by: NURSE PRACTITIONER

## 2024-04-03 PROCEDURE — 90471 IMMUNIZATION ADMIN: CPT | Performed by: NURSE PRACTITIONER

## 2024-04-03 PROCEDURE — 82570 ASSAY OF URINE CREATININE: CPT

## 2024-04-03 PROCEDURE — 82043 UR ALBUMIN QUANTITATIVE: CPT

## 2024-04-03 RX ORDER — LOSARTAN POTASSIUM 25 MG/1
25 TABLET ORAL DAILY
Qty: 90 TABLET | Refills: 3 | Status: SHIPPED | OUTPATIENT
Start: 2024-04-03

## 2024-04-03 RX ORDER — ALBUTEROL SULFATE 90 UG/1
2 AEROSOL, METERED RESPIRATORY (INHALATION) EVERY 4 HOURS PRN
Qty: 1 EACH | Refills: 11 | Status: SHIPPED | OUTPATIENT
Start: 2024-04-03

## 2024-04-03 RX ORDER — GLIMEPIRIDE 4 MG/1
TABLET ORAL
Qty: 180 TABLET | Refills: 3 | Status: SHIPPED | OUTPATIENT
Start: 2024-04-03

## 2024-04-03 RX ORDER — FEXOFENADINE HCL 180 MG/1
180 TABLET ORAL DAILY
Qty: 90 TABLET | Refills: 3 | Status: SHIPPED | OUTPATIENT
Start: 2024-04-03

## 2024-04-03 ASSESSMENT — FIBROSIS 4 INDEX: FIB4 SCORE: 1.21

## 2024-04-03 ASSESSMENT — PATIENT HEALTH QUESTIONNAIRE - PHQ9: CLINICAL INTERPRETATION OF PHQ2 SCORE: 0

## 2024-04-03 NOTE — ASSESSMENT & PLAN NOTE
Had improved a bit but is back a little; stopped drinking milk and changed from lisinopril to losartan   Has tried nasal sinus rinse but only helps the nose  Is open to trying allegra and drinking more water (at least 80 oz)

## 2024-04-03 NOTE — PROGRESS NOTES
"Subjective:     CC:   Chief Complaint   Patient presents with    Results     Labs     Memory Loss       HPI:   ANGEL presents today with    Type 2 diabetes mellitus (HCC)  A1c has increased from 8.1 to 9  Has been eating a little more bread but still adds protein   Drinks sugar free drinks  Working on getting more water  Does add a little chocolate powder and coffee to splenda  but not a lot of sugar   Is open to increasing the glimepiride  Doesn't check the fbs but will start; goal is 120 or less       Chronic bilateral low back pain with bilateral sciatica  Hurt himself at work lifting something   He saw a specialist but didn't feel the ablation he did was helpful so he'll be seeing a new worker's comp specialist     Chronic cough  Had improved a bit but is back a little; stopped drinking milk and changed from lisinopril to losartan   Has tried nasal sinus rinse but only helps the nose  Is open to trying allegra and drinking more water (at least 80 oz)    Memory change  MOCA 19/30  Does word puzzles and sodoku regularly on his phone  Could drink more water  Hasn't taken any classes lately   Will be doing a class for emissions soon that will work his brain   Wife has noticed his forgetfulness                  ROS per HPI    Objective:     Exam:  /62 (BP Location: Right arm, Patient Position: Sitting, BP Cuff Size: Adult long)   Pulse 88   Temp 37 °C (98.6 °F) (Temporal)   Resp 16   Ht 1.676 m (5' 6\")   Wt 89 kg (196 lb 3.2 oz)   SpO2 93%   BMI 31.67 kg/m²  Body mass index is 31.67 kg/m².    Physical Exam:  Constitutional: Well-developed and well-nourished male. Not diaphoretic. No distress.   Skin: warm, dry, intact, no evidence of rash or concerning lesions  Head: Atraumatic without lesions.  Eyes: Conjunctivae are normal. Pupils are equal, round. No scleral icterus.   Ears:  External ears unremarkable.   Neck: Supple, trachea midline. No thyromegaly present. No cervical or supraclavicular " lymphadenopathy.  Cardiovascular: Regular rate and rhythm without murmur.   Pulmonary: Clear to ausculation. Normal effort. No rales, ronchi, or wheezing.  Extremities: No cyanosis, clubbing, erythema, nor edema.   Neurological: Alert and oriented x 3.   Psychiatric:  Behavior, mood, and affect are appropriate.        Assessment & Plan:     63 y.o. male with the following -     1. Type 2 diabetes mellitus (HCC)  - Diabetic Monofilament LE Exam    2. Need for vaccination  - [place] HepA/HepB IM    3. Chronic bilateral low back pain with bilateral sciatica    4. Type 2 diabetes mellitus with hyperglycemia, without long-term current use of insulin (HCC)  - glimepiride (AMARYL) 4 MG Tab; 2 tabs PO qd  Dispense: 180 Tablet; Refill: 3  Increasing dose from 4 mg to 8 mg    5. Seasonal allergies  - fexofenadine (ALLEGRA) 180 MG tablet; Take 1 Tablet by mouth every day.  Dispense: 90 Tablet; Refill: 3    6. Shortness of breath  - albuterol 108 (90 Base) MCG/ACT Aero Soln inhalation aerosol; Inhale 2 Puffs every four hours as needed for Shortness of Breath.  Dispense: 1 Each; Refill: 11    7. Primary hypertension  - losartan (COZAAR) 25 MG Tab; Take 1 Tablet by mouth every day. For microalbuminuria.  Dispense: 90 Tablet; Refill: 3    8. Chronic cough  Added allegra; cont to work on increasing water intake    9. Memory change  - Referral to Neurology         Return in about 3 months (around 7/3/2024) for DM-A1c. In office    Please note that this dictation was created using voice recognition software. I have made every reasonable attempt to correct obvious errors, but I expect that there are errors of grammar and possibly content that I did not discover before finalizing the note.

## 2024-04-03 NOTE — ASSESSMENT & PLAN NOTE
A1c has increased from 8.1 to 9  Has been eating a little more bread but still adds protein   Drinks sugar free drinks  Working on getting more water  Does add a little chocolate powder and coffee to splenda  but not a lot of sugar   Is open to increasing the glimepiride  Doesn't check the fbs but will start; goal is 120 or less

## 2024-04-03 NOTE — ASSESSMENT & PLAN NOTE
Hurt himself at work lifting something   He saw a specialist but didn't feel the ablation he did was helpful so he'll be seeing a new worker's comp specialist

## 2024-04-03 NOTE — ASSESSMENT & PLAN NOTE
BEBE 19/30  Does word puzzles and sodoku regularly on his phone  Could drink more water  Hasn't taken any classes lately   Will be doing a class for emissions soon that will work his brain   Wife has noticed his forgetfulness

## 2024-04-15 ENCOUNTER — OFFICE VISIT (OUTPATIENT)
Dept: URGENT CARE | Facility: PHYSICIAN GROUP | Age: 64
End: 2024-04-15
Payer: COMMERCIAL

## 2024-04-15 VITALS
DIASTOLIC BLOOD PRESSURE: 72 MMHG | HEIGHT: 66 IN | WEIGHT: 196 LBS | TEMPERATURE: 97.6 F | OXYGEN SATURATION: 93 % | RESPIRATION RATE: 16 BRPM | HEART RATE: 82 BPM | BODY MASS INDEX: 31.5 KG/M2 | SYSTOLIC BLOOD PRESSURE: 128 MMHG

## 2024-04-15 DIAGNOSIS — J06.9 VIRAL URI WITH COUGH: ICD-10-CM

## 2024-04-15 PROCEDURE — 99213 OFFICE O/P EST LOW 20 MIN: CPT | Performed by: NURSE PRACTITIONER

## 2024-04-15 PROCEDURE — 3074F SYST BP LT 130 MM HG: CPT | Performed by: NURSE PRACTITIONER

## 2024-04-15 PROCEDURE — 3078F DIAST BP <80 MM HG: CPT | Performed by: NURSE PRACTITIONER

## 2024-04-15 ASSESSMENT — ENCOUNTER SYMPTOMS
COUGH: 1
SHORTNESS OF BREATH: 0
SPUTUM PRODUCTION: 1
HEADACHES: 1
SORE THROAT: 0
EYE DISCHARGE: 0
ORTHOPNEA: 0
FEVER: 0
DIARRHEA: 0
NAUSEA: 0
MYALGIAS: 0
CHILLS: 0
WHEEZING: 0

## 2024-04-15 ASSESSMENT — FIBROSIS 4 INDEX: FIB4 SCORE: 1.21

## 2024-04-15 NOTE — LETTER
April 15, 2024        ANGEL SCHAEFER  [address] [place] [address]        Angel was seen in our clinic today and he is excused from work for Friday, today and tomorrow. Thank you.     If you have any questions or concerns, please don't hesitate to call.        Sincerely,        [email or web address].    Electronically Signed

## 2024-04-15 NOTE — PROGRESS NOTES
Subjective     Hank SCHAEFER is a 63 y.o. male who presents with Congestion (Sx 10 days ), Cough, and Headache            HPI  New problem.  Patient is a 63-year-old male who presents with 4-day history of nasal congestion, cough, and headache.  He denies fever, chills, myalgia, nausea, or diarrhea.  He denies sore throat.  He has been taking over-the-counter cough and cold medication for his symptoms.  He is needing a work note.    Patient has no known allergies.  Current Outpatient Medications on File Prior to Visit   Medication Sig Dispense Refill    glimepiride (AMARYL) 4 MG Tab 2 tabs PO qd 180 Tablet 3    fexofenadine (ALLEGRA) 180 MG tablet Take 1 Tablet by mouth every day. 90 Tablet 3    albuterol 108 (90 Base) MCG/ACT Aero Soln inhalation aerosol Inhale 2 Puffs every four hours as needed for Shortness of Breath. 1 Each 11    losartan (COZAAR) 25 MG Tab Take 1 Tablet by mouth every day. For microalbuminuria. 90 Tablet 3    JARDIANCE 25 MG Tab TAKE 1 TABLET BY MOUTH EVERY DAY 90 Tablet 3    metFORMIN ER (GLUCOPHAGE XR) 500 MG TABLET SR 24 HR TAKE 1 TABLET BY MOUTH IN THE MORNING AND 2 TABLETS AT NIGHT 270 Tablet 3    glucose blood (COOL BLOOD GLUCOSE TEST STRIPS) strip 1 Strip by Other route every day. 100 Strip 3    Lancets Lancets order: Lancets for insurance approved meter. Sig: use once daily for high or low sugar. #100 RF x 3 200 Each 2    Blood Glucose Monitoring Suppl Device Meter: Dispense Device of Insurance Preference. Sig. Use as directed for blood sugar monitoring. #1. NR. 1 Each 0    Fenugreek 610 MG Cap Take 610 mg by mouth every day.       No current facility-administered medications on file prior to visit.       Patient Active Problem List    Diagnosis Date Noted    Chronic bilateral low back pain with bilateral sciatica 04/03/2024    Memory change 01/02/2024    Chronic cough 11/16/2023    Antalgic gait 05/20/2023    Pruritic rash 04/25/2023    Obesity (BMI 30-39.9) 12/19/2022    Lumbar  spondylosis 12/14/2022    Pain in thoracic spine 12/14/2022    Elevated liver enzymes 04/12/2022    Chronic hepatitis C without hepatic coma (HCC) 04/12/2022    Type 2 diabetes mellitus (HCC) 06/29/2021    Lymphocytic colitis 06/29/2021    Essential hypertension 04/16/2015     Current Outpatient Medications   Medication Sig Dispense Refill    glimepiride (AMARYL) 4 MG Tab 2 tabs PO qd 180 Tablet 3    fexofenadine (ALLEGRA) 180 MG tablet Take 1 Tablet by mouth every day. 90 Tablet 3    albuterol 108 (90 Base) MCG/ACT Aero Soln inhalation aerosol Inhale 2 Puffs every four hours as needed for Shortness of Breath. 1 Each 11    losartan (COZAAR) 25 MG Tab Take 1 Tablet by mouth every day. For microalbuminuria. 90 Tablet 3    JARDIANCE 25 MG Tab TAKE 1 TABLET BY MOUTH EVERY DAY 90 Tablet 3    metFORMIN ER (GLUCOPHAGE XR) 500 MG TABLET SR 24 HR TAKE 1 TABLET BY MOUTH IN THE MORNING AND 2 TABLETS AT NIGHT 270 Tablet 3    glucose blood (COOL BLOOD GLUCOSE TEST STRIPS) strip 1 Strip by Other route every day. 100 Strip 3    Lancets Lancets order: Lancets for insurance approved meter. Sig: use once daily for high or low sugar. #100 RF x 3 200 Each 2    Blood Glucose Monitoring Suppl Device Meter: Dispense Device of Insurance Preference. Sig. Use as directed for blood sugar monitoring. #1. NR. 1 Each 0    Fenugreek 610 MG Cap Take 610 mg by mouth every day.       No current facility-administered medications for this visit.      Current social contact/activities:  Social History     Tobacco Use    Smoking status: Former    Smokeless tobacco: Never   Vaping Use    Vaping Use: Never used   Substance Use Topics    Alcohol use: Not Currently    Drug use: Not Currently       Family History   Problem Relation Age of Onset    Heart Attack Mother     Alzheimer's Disease Mother     Arthritis Father     Gout Father     Alzheimer's Disease Maternal Grandfather      Past Surgical History:   Procedure Laterality Date    OTHER      tubes in  "ears as child    OTHER ORTHOPEDIC SURGERY      Left elbow ORIF    TONSILLECTOMY         Review of Systems   Constitutional:  Positive for malaise/fatigue. Negative for chills and fever.   HENT:  Positive for congestion. Negative for sore throat.    Eyes:  Negative for discharge.   Respiratory:  Positive for cough and sputum production. Negative for shortness of breath and wheezing.    Cardiovascular:  Negative for chest pain and orthopnea.   Gastrointestinal:  Negative for diarrhea and nausea.   Musculoskeletal:  Negative for myalgias.   Neurological:  Positive for headaches.   Endo/Heme/Allergies:  Negative for environmental allergies.   All other systems reviewed and are negative.                    Objective     /72   Pulse 82   Temp 36.4 °C (97.6 °F) (Temporal)   Resp 16   Ht 1.676 m (5' 6\")   Wt 88.9 kg (196 lb)   SpO2 93%   BMI 31.64 kg/m²      Physical Exam  Constitutional:       General: He is not in acute distress.     Appearance: He is well-developed.   HENT:      Head: Normocephalic and atraumatic.      Right Ear: Tympanic membrane, ear canal and external ear normal. No middle ear effusion. Tympanic membrane is not injected or perforated.      Left Ear: Tympanic membrane, ear canal and external ear normal.  No middle ear effusion. Tympanic membrane is not injected or perforated.      Nose: Mucosal edema, congestion and rhinorrhea present.   Eyes:      General:         Right eye: No discharge.         Left eye: No discharge.      Conjunctiva/sclera: Conjunctivae normal.   Cardiovascular:      Rate and Rhythm: Normal rate and regular rhythm.      Heart sounds: Normal heart sounds. No murmur heard.  Pulmonary:      Effort: Pulmonary effort is normal. No respiratory distress.      Breath sounds: Normal breath sounds.   Musculoskeletal:         General: Normal range of motion.      Cervical back: Normal range of motion and neck supple.      Comments: Normal movement of all 4 extremities. "   Lymphadenopathy:      Cervical: No cervical adenopathy.      Upper Body:      Right upper body: No supraclavicular adenopathy.      Left upper body: No supraclavicular adenopathy.   Skin:     General: Skin is warm and dry.   Neurological:      Mental Status: He is alert and oriented to person, place, and time.      Gait: Gait normal.   Psychiatric:         Behavior: Behavior normal.         Thought Content: Thought content normal.                             Assessment & Plan        1. Viral URI with cough          Viral illness at this time with no indication for antibiotics. Reviewed with patient expected course of illness and also reviewed OTC medications that may be used for symptom relief. Follow up 7-10 days if not improving.

## 2024-04-20 NOTE — RESULT ENCOUNTER NOTE
Darrin SCHAEFER  Your urine microalbumin (protein) level is normal! Next is due in 1 year.   Dr. Gimenez

## 2024-05-22 ENCOUNTER — OFFICE VISIT (OUTPATIENT)
Dept: MEDICAL GROUP | Facility: PHYSICIAN GROUP | Age: 64
End: 2024-05-22
Payer: COMMERCIAL

## 2024-05-22 VITALS
OXYGEN SATURATION: 96 % | HEART RATE: 70 BPM | BODY MASS INDEX: 31.79 KG/M2 | DIASTOLIC BLOOD PRESSURE: 68 MMHG | TEMPERATURE: 97.1 F | HEIGHT: 66 IN | RESPIRATION RATE: 16 BRPM | WEIGHT: 197.8 LBS | SYSTOLIC BLOOD PRESSURE: 112 MMHG

## 2024-05-22 DIAGNOSIS — R41.3 MEMORY CHANGE: ICD-10-CM

## 2024-05-22 DIAGNOSIS — M54.6 PAIN IN THORACIC SPINE: ICD-10-CM

## 2024-05-22 DIAGNOSIS — Z23 NEED FOR VACCINATION: ICD-10-CM

## 2024-05-22 DIAGNOSIS — Z76.0 MEDICATION REFILL: ICD-10-CM

## 2024-05-22 DIAGNOSIS — E11.65 TYPE 2 DIABETES MELLITUS WITH HYPERGLYCEMIA, WITHOUT LONG-TERM CURRENT USE OF INSULIN (HCC): ICD-10-CM

## 2024-05-22 LAB
HBA1C MFR BLD: 7.7 % (ref ?–5.8)
POCT INT CON NEG: NEGATIVE
POCT INT CON POS: POSITIVE

## 2024-05-22 PROCEDURE — 83036 HEMOGLOBIN GLYCOSYLATED A1C: CPT | Performed by: NURSE PRACTITIONER

## 2024-05-22 PROCEDURE — 90471 IMMUNIZATION ADMIN: CPT | Performed by: NURSE PRACTITIONER

## 2024-05-22 PROCEDURE — 3074F SYST BP LT 130 MM HG: CPT | Performed by: NURSE PRACTITIONER

## 2024-05-22 PROCEDURE — 3078F DIAST BP <80 MM HG: CPT | Performed by: NURSE PRACTITIONER

## 2024-05-22 PROCEDURE — 90636 HEP A/HEP B VACC ADULT IM: CPT | Performed by: NURSE PRACTITIONER

## 2024-05-22 PROCEDURE — 99214 OFFICE O/P EST MOD 30 MIN: CPT | Mod: 25 | Performed by: NURSE PRACTITIONER

## 2024-05-22 RX ORDER — METFORMIN HYDROCHLORIDE 500 MG/1
TABLET, EXTENDED RELEASE ORAL
Qty: 270 TABLET | Refills: 3 | Status: SHIPPED | OUTPATIENT
Start: 2024-05-22

## 2024-05-22 RX ORDER — BLOOD SUGAR DIAGNOSTIC
1 STRIP MISCELLANEOUS DAILY
Qty: 100 STRIP | Refills: 3 | Status: SHIPPED | OUTPATIENT
Start: 2024-05-22

## 2024-05-22 RX ORDER — LANCETS 30 GAUGE
EACH MISCELLANEOUS
Qty: 200 EACH | Refills: 3 | Status: SHIPPED | OUTPATIENT
Start: 2024-05-22

## 2024-05-22 ASSESSMENT — FIBROSIS 4 INDEX: FIB4 SCORE: 1.21

## 2024-05-22 NOTE — PROGRESS NOTES
"Subjective:     CC:   Chief Complaint   Patient presents with    Medication Refill     Lancets, strips, metformin        HPI:   ANGEL presents today with    Pain in thoracic spine  Has an appt w/[facility] in the near future     Memory change  Has appt w/neurology in Sept     Medication refill  Pt heard I will no longer be here as of 5/30, so he needs his meds refilled    Type 2 diabetes mellitus (HCC)  Last A1c in Feb was 9; A1c today is 7.7  Most recent fbs around 130-140  Has cut back on ice cream  Drinks sugar free drinks              ROS per HPI    Objective:     Exam:  /68 (BP Location: Right arm, Patient Position: Sitting, BP Cuff Size: Adult long)   Pulse 70   Temp 36.2 °C (97.1 °F) (Temporal)   Resp 16   Ht 1.676 m (5' 6\")   Wt 89.7 kg (197 lb 12.8 oz)   SpO2 96%   BMI 31.93 kg/m²  Body mass index is 31.93 kg/m².    Physical Exam:  Constitutional: Well-developed and well-nourished male. Not diaphoretic. No distress.   Skin: warm, dry, intact, no evidence of rash or concerning lesions  Head: Atraumatic without lesions.  Eyes: Conjunctivae are normal. Pupils are equal, round. No scleral icterus.   Ears:  External ears unremarkable.   Neck: Supple, trachea midline. No thyromegaly present. No cervical or supraclavicular lymphadenopathy.  Cardiovascular: Regular rate and rhythm without murmur.   Pulmonary: Clear to ausculation. Normal effort. No rales, ronchi, or wheezing.  Extremities: No cyanosis, clubbing, erythema, nor edema.   Neurological: Alert and oriented x 3.   Psychiatric:  Behavior, mood, and affect are appropriate.        Assessment & Plan:     63 y.o. male with the following -     1. Need for vaccination  - [place] HepA/HepB IM    2. Pain in thoracic spine    3. Memory change    4. Medication refill    5. Type 2 diabetes mellitus with hyperglycemia, without long-term current use of insulin (HCC)  - glucose blood (COOL BLOOD GLUCOSE TEST STRIPS) strip; 1 Strip by Other route every " day.  Dispense: 100 Strip; Refill: 3  - Lancets; Lancets order: Lancets for insurance approved meter. Sig: use once daily for high or low sugar. #100 RF x 3  Dispense: 200 Each; Refill: 3  - metFORMIN ER (GLUCOPHAGE XR) 500 MG TABLET SR 24 HR; TAKE 1 TABLET BY MOUTH IN THE MORNING AND 2 TABLETS AT NIGHT  Dispense: 270 Tablet; Refill: 3  - POCT  A1C         Return in about 3 months (around 8/22/2024) for [facility] care w/new pcp as my last day is 5/30.    Please note that this dictation was created using voice recognition software. I have made every reasonable attempt to correct obvious errors, but I expect that there are errors of grammar and possibly content that I did not discover before finalizing the note.

## 2024-05-31 ENCOUNTER — DOCUMENTATION (OUTPATIENT)
Dept: HEALTH INFORMATION MANAGEMENT | Facility: OTHER | Age: 64
End: 2024-05-31
Payer: COMMERCIAL

## 2024-07-15 ENCOUNTER — APPOINTMENT (OUTPATIENT)
Dept: RADIOLOGY | Facility: MEDICAL CENTER | Age: 64
End: 2024-07-15
Attending: ORTHOPAEDIC SURGERY
Payer: COMMERCIAL

## 2024-07-15 DIAGNOSIS — M54.6 PAIN IN THORACIC SPINE: ICD-10-CM

## 2024-07-15 DIAGNOSIS — M54.50 LOW BACK PAIN, UNSPECIFIED BACK PAIN LATERALITY, UNSPECIFIED CHRONICITY, UNSPECIFIED WHETHER SCIATICA PRESENT: ICD-10-CM

## 2024-07-15 PROCEDURE — 72148 MRI LUMBAR SPINE W/O DYE: CPT

## 2024-07-15 PROCEDURE — 72070 X-RAY EXAM THORAC SPINE 2VWS: CPT

## 2024-07-15 PROCEDURE — 72146 MRI CHEST SPINE W/O DYE: CPT

## 2024-08-23 ENCOUNTER — OFFICE VISIT (OUTPATIENT)
Dept: MEDICAL GROUP | Facility: PHYSICIAN GROUP | Age: 64
End: 2024-08-23

## 2024-08-23 VITALS
SYSTOLIC BLOOD PRESSURE: 112 MMHG | OXYGEN SATURATION: 92 % | HEART RATE: 63 BPM | HEIGHT: 66 IN | WEIGHT: 186 LBS | BODY MASS INDEX: 29.89 KG/M2 | TEMPERATURE: 97 F | RESPIRATION RATE: 11 BRPM | DIASTOLIC BLOOD PRESSURE: 62 MMHG

## 2024-08-23 DIAGNOSIS — E11.65 TYPE 2 DIABETES MELLITUS WITH HYPERGLYCEMIA, WITHOUT LONG-TERM CURRENT USE OF INSULIN (HCC): ICD-10-CM

## 2024-08-23 PROCEDURE — 3074F SYST BP LT 130 MM HG: CPT | Performed by: FAMILY MEDICINE

## 2024-08-23 PROCEDURE — 3078F DIAST BP <80 MM HG: CPT | Performed by: FAMILY MEDICINE

## 2024-08-23 PROCEDURE — 99213 OFFICE O/P EST LOW 20 MIN: CPT | Performed by: FAMILY MEDICINE

## 2024-08-23 RX ORDER — FLUTICASONE PROPIONATE 44 UG/1
1 AEROSOL, METERED RESPIRATORY (INHALATION) 2 TIMES DAILY
Qty: 10.6 G | Refills: 0 | Status: SHIPPED | OUTPATIENT
Start: 2024-08-23

## 2024-08-23 ASSESSMENT — FIBROSIS 4 INDEX: FIB4 SCORE: 1.21

## 2024-08-28 NOTE — ASSESSMENT & PLAN NOTE
Improving from 9 to 7.7 repeat labs ordered  Continue diet changes, metfomrin, glimepiride, jardiance  On losartan  Follow up A1c ordered.

## 2024-08-28 NOTE — PROGRESS NOTES
Subjective:   ANGEL SCHAEFER is a 63 y.o. male here today for evaluation and management of:     Type 2 diabetes mellitus (HCC)  Improving from 9 to 7.7 repeat labs ordered  Continue diet changes, metfomrin, glimepiride, jardiance  On losartan  Follow up A1c ordered.            Current medicines (including changes today)  Current Outpatient Medications   Medication Sig Dispense Refill    fluticasone (FLOVENT HFA) 44 MCG/ACT Aerosol Inhale 1 Puff 2 times a day. 10.6 g 0    glucose blood (COOL BLOOD GLUCOSE TEST STRIPS) strip 1 Strip by Other route every day. 100 Strip 3    Lancets Lancets order: Lancets for insurance approved meter. Sig: use once daily for high or low sugar. #100 RF x 3 200 Each 3    metFORMIN ER (GLUCOPHAGE XR) 500 MG TABLET SR 24 HR TAKE 1 TABLET BY MOUTH IN THE MORNING AND 2 TABLETS AT NIGHT 270 Tablet 3    glimepiride (AMARYL) 4 MG Tab 2 tabs PO qd 180 Tablet 3    albuterol 108 (90 Base) MCG/ACT Aero Soln inhalation aerosol Inhale 2 Puffs every four hours as needed for Shortness of Breath. 1 Each 11    losartan (COZAAR) 25 MG Tab Take 1 Tablet by mouth every day. For microalbuminuria. 90 Tablet 3    JARDIANCE 25 MG Tab TAKE 1 TABLET BY MOUTH EVERY DAY 90 Tablet 3    Blood Glucose Monitoring Suppl Device Meter: Dispense Device of Insurance Preference. Sig. Use as directed for blood sugar monitoring. #1. NR. 1 Each 0    Fenugreek 610 MG Cap Take 610 mg by mouth every day.       No current facility-administered medications for this visit.     He  has a past medical history of Arthritis, Diabetes ([facility]), and Essential hypertension (4/16/2015).    He has no past medical history of Anemia, Anxiety, Arrhythmia, Asthma, Cancer ([facility]), Clotting disorder ([facility]), COPD (chronic obstructive pulmonary disease) ([facility]), Depression, Diabetic neuropathy ([facility]), GERD (gastroesophageal reflux disease), Heart attack ([facility]), Heart murmur, HIV (human immunodeficiency virus infection) ([facility]), Hyperlipidemia, Kidney disease,  "Seizure (HCC), Stroke (HCC), Substance abuse (HCC), Thyroid disease, or Tuberculosis.    ROS  No chest pain, no shortness of breath, no abdominal pain       Objective:     /62 (BP Location: Right arm, Patient Position: Sitting, BP Cuff Size: Adult)   Pulse 63   Temp 36.1 °C (97 °F) (Temporal)   Resp (!) 11   Ht 1.676 m (5' 6\")   Wt 84.4 kg (186 lb)   SpO2 92%  Body mass index is 30.02 kg/m².   Physical Exam:  Constitutional: Alert, no distress.  Skin: Warm, dry, good turgor, no rashes in visible areas.  Eye: Equal, round and reactive, conjunctiva clear, lids normal.  ENMT: Lips without lesions, good dentition, oropharynx clear.  Neck: Trachea midline, no masses, no thyromegaly. No cervical or supraclavicular lymphadenopathy  Respiratory: Unlabored respiratory effort, lungs clear to auscultation, no wheezes, no ronchi.  Cardiovascular: Normal S1, S2, no murmur, no edema.  Abdomen: Soft, non-tender, no masses, no hepatosplenomegaly.  Psych: Alert and oriented x3, normal affect and mood.    Assessment and Plan:   The following treatment plan was discussed    1. Type 2 diabetes mellitus with hyperglycemia, without long-term current use of insulin ([facility])  - HEMOGLOBIN A1C; Future    Other orders  - fluticasone (FLOVENT HFA) 44 MCG/ACT Aerosol; Inhale 1 Puff 2 times a day.  Dispense: 10.6 g; Refill: 0      Followup: Return in about 6 months (around 2/23/2025) for Lab Review, Diabetes.           "

## 2024-09-27 ENCOUNTER — APPOINTMENT (OUTPATIENT)
Dept: NEUROLOGY | Facility: MEDICAL CENTER | Age: 64
End: 2024-09-27
Attending: PSYCHIATRY & NEUROLOGY

## 2024-10-11 ENCOUNTER — OFFICE VISIT (OUTPATIENT)
Dept: NEUROLOGY | Facility: MEDICAL CENTER | Age: 64
End: 2024-10-11
Attending: PSYCHIATRY & NEUROLOGY
Payer: COMMERCIAL

## 2024-10-11 ENCOUNTER — HOSPITAL ENCOUNTER (OUTPATIENT)
Dept: LAB | Facility: MEDICAL CENTER | Age: 64
End: 2024-10-11
Attending: PSYCHIATRY & NEUROLOGY
Payer: COMMERCIAL

## 2024-10-11 VITALS
HEIGHT: 66 IN | DIASTOLIC BLOOD PRESSURE: 52 MMHG | RESPIRATION RATE: 16 BRPM | WEIGHT: 192.02 LBS | OXYGEN SATURATION: 92 % | HEART RATE: 73 BPM | BODY MASS INDEX: 30.86 KG/M2 | TEMPERATURE: 98.4 F | SYSTOLIC BLOOD PRESSURE: 100 MMHG

## 2024-10-11 DIAGNOSIS — G30.0 MILD EARLY ONSET ALZHEIMER'S DEMENTIA WITHOUT BEHAVIORAL DISTURBANCE, PSYCHOTIC DISTURBANCE, MOOD DISTURBANCE, OR ANXIETY (HCC): Primary | ICD-10-CM

## 2024-10-11 DIAGNOSIS — F02.A0 MILD EARLY ONSET ALZHEIMER'S DEMENTIA WITHOUT BEHAVIORAL DISTURBANCE, PSYCHOTIC DISTURBANCE, MOOD DISTURBANCE, OR ANXIETY (HCC): ICD-10-CM

## 2024-10-11 DIAGNOSIS — F02.A0 MILD EARLY ONSET ALZHEIMER'S DEMENTIA WITHOUT BEHAVIORAL DISTURBANCE, PSYCHOTIC DISTURBANCE, MOOD DISTURBANCE, OR ANXIETY (HCC): Primary | ICD-10-CM

## 2024-10-11 DIAGNOSIS — G30.0 MILD EARLY ONSET ALZHEIMER'S DEMENTIA WITHOUT BEHAVIORAL DISTURBANCE, PSYCHOTIC DISTURBANCE, MOOD DISTURBANCE, OR ANXIETY (HCC): ICD-10-CM

## 2024-10-11 LAB
FOLATE SERPL-MCNC: 26.5 NG/ML
VIT B12 SERPL-MCNC: 1241 PG/ML (ref 211–911)

## 2024-10-11 PROCEDURE — 99205 OFFICE O/P NEW HI 60 MIN: CPT | Performed by: PSYCHIATRY & NEUROLOGY

## 2024-10-11 PROCEDURE — 84425 ASSAY OF VITAMIN B-1: CPT

## 2024-10-11 PROCEDURE — 99212 OFFICE O/P EST SF 10 MIN: CPT | Performed by: PSYCHIATRY & NEUROLOGY

## 2024-10-11 PROCEDURE — 82746 ASSAY OF FOLIC ACID SERUM: CPT

## 2024-10-11 PROCEDURE — 3078F DIAST BP <80 MM HG: CPT | Performed by: PSYCHIATRY & NEUROLOGY

## 2024-10-11 PROCEDURE — 3074F SYST BP LT 130 MM HG: CPT | Performed by: PSYCHIATRY & NEUROLOGY

## 2024-10-11 PROCEDURE — 36415 COLL VENOUS BLD VENIPUNCTURE: CPT

## 2024-10-11 PROCEDURE — 82607 VITAMIN B-12: CPT

## 2024-10-11 RX ORDER — DONEPEZIL HYDROCHLORIDE 10 MG/1
TABLET, FILM COATED ORAL
Qty: 90 TABLET | Refills: 6 | Status: SHIPPED | OUTPATIENT
Start: 2024-10-11 | End: 2025-09-29

## 2024-10-11 ASSESSMENT — MONTREAL COGNITIVE ASSESSMENT (MOCA)
8. SERIAL SUBTRACTION OF 7S: 0/5
7. [VIGILENCE] TAP WHEN HEARING DESIGNATED LETTER: 1/1
DELAYED RECALL SUBSCORE: 0/5
9. REPEAT EACH SENTENCE: 1/2
1. ALTERNATING TRAIL MAKING: 0/1
6. READ LIST OF DIGITS [FORWARD/BACKWARD]: 2/2
2. COPY DRAWING: 0/1
5. MEMORY TRIALS: SECOND TRIAL
ORIENTATION SUBSCORE: 4/6
3. DRAW A CLOCK: CONTOUR, NUMBERS, HANDS: 1/3
4. NAME EACH OF THE THREE ANIMALS SHOWN: 3/3
10. [FLUENCY] NAME WORDS STARTING WITH DESIGNATED LETTER: 1/1
WHAT IS THE VERSION OF MOCA ADMINISTERED: 7.3
WHAT IS THE TOTAL SCORE (OUT OF 30): 15
11. FOR EACH PAIR OF WORDS, WHAT CATEGORY DO THEY BELONG TO (OUT OF 2): 2/2
CATEGORY CUE (IF APPLICABLE): 3

## 2024-10-11 ASSESSMENT — PATIENT HEALTH QUESTIONNAIRE - PHQ9: CLINICAL INTERPRETATION OF PHQ2 SCORE: 0

## 2024-10-11 ASSESSMENT — FIBROSIS 4 INDEX: FIB4 SCORE: 1.21

## 2024-10-16 LAB — VIT B1 BLD-MCNC: 274 NMOL/L (ref 70–180)

## 2024-11-09 ENCOUNTER — HOSPITAL ENCOUNTER (OUTPATIENT)
Dept: RADIOLOGY | Facility: MEDICAL CENTER | Age: 64
End: 2024-11-09
Attending: PSYCHIATRY & NEUROLOGY
Payer: COMMERCIAL

## 2024-11-09 DIAGNOSIS — F02.A0 MILD EARLY ONSET ALZHEIMER'S DEMENTIA WITHOUT BEHAVIORAL DISTURBANCE, PSYCHOTIC DISTURBANCE, MOOD DISTURBANCE, OR ANXIETY (HCC): ICD-10-CM

## 2024-11-09 DIAGNOSIS — G30.0 MILD EARLY ONSET ALZHEIMER'S DEMENTIA WITHOUT BEHAVIORAL DISTURBANCE, PSYCHOTIC DISTURBANCE, MOOD DISTURBANCE, OR ANXIETY (HCC): ICD-10-CM

## 2024-11-09 PROCEDURE — 70551 MRI BRAIN STEM W/O DYE: CPT

## 2024-11-21 ENCOUNTER — DOCUMENTATION (OUTPATIENT)
Dept: NEUROLOGY | Facility: MEDICAL CENTER | Age: 64
End: 2024-11-21
Payer: COMMERCIAL

## 2024-11-21 DIAGNOSIS — R90.89 ABNORMAL BRAIN MRI: ICD-10-CM

## 2024-11-21 DIAGNOSIS — F03.A0 MILD NEURODEGENERATIVE DEMENTIA (HCC): ICD-10-CM

## 2024-12-11 ENCOUNTER — HOSPITAL ENCOUNTER (OUTPATIENT)
Dept: RADIOLOGY | Facility: MEDICAL CENTER | Age: 64
End: 2024-12-11
Attending: PSYCHIATRY & NEUROLOGY
Payer: COMMERCIAL

## 2024-12-11 DIAGNOSIS — R90.89 ABNORMAL BRAIN MRI: ICD-10-CM

## 2024-12-11 DIAGNOSIS — F03.A0 MILD NEURODEGENERATIVE DEMENTIA (HCC): ICD-10-CM

## 2024-12-11 LAB — GLUCOSE BLD-MCNC: 96 MG/DL (ref 65–99)

## 2024-12-11 PROCEDURE — A9552 F18 FDG: HCPCS
